# Patient Record
Sex: FEMALE | Race: WHITE | Employment: OTHER | URBAN - METROPOLITAN AREA
[De-identification: names, ages, dates, MRNs, and addresses within clinical notes are randomized per-mention and may not be internally consistent; named-entity substitution may affect disease eponyms.]

---

## 2021-01-01 ENCOUNTER — OFFICE VISIT (OUTPATIENT)
Dept: GERIATRIC MEDICINE | Age: 81
End: 2021-01-01
Payer: MEDICARE

## 2021-01-01 ENCOUNTER — HOSPITAL ENCOUNTER (INPATIENT)
Age: 81
LOS: 8 days | Discharge: NURSING, REHAB OR LONG-TERM CARE | DRG: 640 | End: 2021-09-04
Attending: EMERGENCY MEDICINE | Admitting: INTERNAL MEDICINE
Payer: MEDICARE

## 2021-01-01 ENCOUNTER — APPOINTMENT (OUTPATIENT)
Dept: GENERAL RADIOLOGY | Age: 81
DRG: 640 | End: 2021-01-01
Payer: MEDICARE

## 2021-01-01 ENCOUNTER — APPOINTMENT (OUTPATIENT)
Dept: ULTRASOUND IMAGING | Age: 81
DRG: 640 | End: 2021-01-01
Payer: MEDICARE

## 2021-01-01 ENCOUNTER — VIRTUAL VISIT (OUTPATIENT)
Dept: GERIATRIC MEDICINE | Age: 81
End: 2021-01-01
Payer: MEDICARE

## 2021-01-01 VITALS
RESPIRATION RATE: 20 BRPM | HEART RATE: 71 BPM | DIASTOLIC BLOOD PRESSURE: 51 MMHG | OXYGEN SATURATION: 93 % | SYSTOLIC BLOOD PRESSURE: 124 MMHG

## 2021-01-01 VITALS — DIASTOLIC BLOOD PRESSURE: 68 MMHG | TEMPERATURE: 98.4 F | HEART RATE: 86 BPM | SYSTOLIC BLOOD PRESSURE: 113 MMHG

## 2021-01-01 VITALS
OXYGEN SATURATION: 99 % | BODY MASS INDEX: 47.7 KG/M2 | WEIGHT: 269.18 LBS | SYSTOLIC BLOOD PRESSURE: 99 MMHG | HEIGHT: 63 IN | DIASTOLIC BLOOD PRESSURE: 70 MMHG | HEART RATE: 60 BPM | TEMPERATURE: 97.2 F | RESPIRATION RATE: 16 BRPM

## 2021-01-01 VITALS
SYSTOLIC BLOOD PRESSURE: 124 MMHG | RESPIRATION RATE: 18 BRPM | TEMPERATURE: 97 F | OXYGEN SATURATION: 97 % | DIASTOLIC BLOOD PRESSURE: 64 MMHG | HEART RATE: 64 BPM

## 2021-01-01 VITALS
RESPIRATION RATE: 22 BRPM | WEIGHT: 269 LBS | OXYGEN SATURATION: 95 % | HEART RATE: 88 BPM | TEMPERATURE: 97.4 F | DIASTOLIC BLOOD PRESSURE: 78 MMHG | SYSTOLIC BLOOD PRESSURE: 142 MMHG | BODY MASS INDEX: 47.65 KG/M2

## 2021-01-01 VITALS
TEMPERATURE: 98.1 F | WEIGHT: 200.8 LBS | SYSTOLIC BLOOD PRESSURE: 130 MMHG | OXYGEN SATURATION: 94 % | RESPIRATION RATE: 16 BRPM | HEART RATE: 60 BPM | BODY MASS INDEX: 35.57 KG/M2 | DIASTOLIC BLOOD PRESSURE: 68 MMHG

## 2021-01-01 VITALS
HEART RATE: 64 BPM | OXYGEN SATURATION: 90 % | DIASTOLIC BLOOD PRESSURE: 55 MMHG | RESPIRATION RATE: 16 BRPM | TEMPERATURE: 98.3 F | SYSTOLIC BLOOD PRESSURE: 112 MMHG

## 2021-01-01 VITALS
SYSTOLIC BLOOD PRESSURE: 124 MMHG | HEART RATE: 61 BPM | OXYGEN SATURATION: 96 % | TEMPERATURE: 98.1 F | DIASTOLIC BLOOD PRESSURE: 53 MMHG

## 2021-01-01 DIAGNOSIS — D50.9 IRON DEFICIENCY ANEMIA, UNSPECIFIED IRON DEFICIENCY ANEMIA TYPE: ICD-10-CM

## 2021-01-01 DIAGNOSIS — E87.6 HYPOKALEMIA: Primary | ICD-10-CM

## 2021-01-01 DIAGNOSIS — I27.20 PULMONARY HTN (HCC): ICD-10-CM

## 2021-01-01 DIAGNOSIS — G93.40 ENCEPHALOPATHY: Primary | ICD-10-CM

## 2021-01-01 DIAGNOSIS — N17.9 ACUTE RENAL FAILURE, UNSPECIFIED ACUTE RENAL FAILURE TYPE (HCC): ICD-10-CM

## 2021-01-01 DIAGNOSIS — E87.5 HYPERKALEMIA: ICD-10-CM

## 2021-01-01 DIAGNOSIS — N17.9 AKI (ACUTE KIDNEY INJURY) (HCC): ICD-10-CM

## 2021-01-01 DIAGNOSIS — K59.01 SLOW TRANSIT CONSTIPATION: ICD-10-CM

## 2021-01-01 DIAGNOSIS — D69.6 THROMBOCYTOPENIA (HCC): ICD-10-CM

## 2021-01-01 DIAGNOSIS — R60.0 BILATERAL EDEMA OF LOWER EXTREMITY: ICD-10-CM

## 2021-01-01 DIAGNOSIS — I50.32 CHRONIC DIASTOLIC CONGESTIVE HEART FAILURE (HCC): Primary | ICD-10-CM

## 2021-01-01 DIAGNOSIS — I25.5 ISCHEMIC CARDIOMYOPATHY: ICD-10-CM

## 2021-01-01 DIAGNOSIS — N18.30 STAGE 3 CHRONIC KIDNEY DISEASE, UNSPECIFIED WHETHER STAGE 3A OR 3B CKD (HCC): ICD-10-CM

## 2021-01-01 DIAGNOSIS — I50.22 CHRONIC SYSTOLIC HEART FAILURE (HCC): ICD-10-CM

## 2021-01-01 DIAGNOSIS — R53.1 GENERALIZED WEAKNESS: ICD-10-CM

## 2021-01-01 DIAGNOSIS — E87.6 HYPOKALEMIA: ICD-10-CM

## 2021-01-01 DIAGNOSIS — I50.23 ACUTE ON CHRONIC SYSTOLIC CONGESTIVE HEART FAILURE (HCC): ICD-10-CM

## 2021-01-01 DIAGNOSIS — I50.9 ACUTE CONGESTIVE HEART FAILURE, UNSPECIFIED HEART FAILURE TYPE (HCC): Primary | ICD-10-CM

## 2021-01-01 DIAGNOSIS — I50.22 CHRONIC SYSTOLIC HEART FAILURE (HCC): Primary | ICD-10-CM

## 2021-01-01 DIAGNOSIS — N18.4 CKD (CHRONIC KIDNEY DISEASE) STAGE 4, GFR 15-29 ML/MIN (HCC): ICD-10-CM

## 2021-01-01 DIAGNOSIS — D86.0 PULMONARY SARCOIDOSIS (HCC): ICD-10-CM

## 2021-01-01 DIAGNOSIS — J15.9 BACTERIAL PNEUMONIA: Primary | ICD-10-CM

## 2021-01-01 DIAGNOSIS — J44.9 COPD MIXED TYPE (HCC): ICD-10-CM

## 2021-01-01 DIAGNOSIS — D86.9 SARCOIDOSIS: ICD-10-CM

## 2021-01-01 DIAGNOSIS — I48.0 PAF (PAROXYSMAL ATRIAL FIBRILLATION) (HCC): Primary | ICD-10-CM

## 2021-01-01 DIAGNOSIS — Z91.89: ICD-10-CM

## 2021-01-01 DIAGNOSIS — E83.52 HYPERCALCEMIA: ICD-10-CM

## 2021-01-01 DIAGNOSIS — I15.9 SECONDARY HYPERTENSION: ICD-10-CM

## 2021-01-01 DIAGNOSIS — E83.51 HYPOCALCEMIA: ICD-10-CM

## 2021-01-01 DIAGNOSIS — E83.42 HYPOMAGNESEMIA: Primary | ICD-10-CM

## 2021-01-01 DIAGNOSIS — R41.0 DISORIENTATION: ICD-10-CM

## 2021-01-01 DIAGNOSIS — R79.89 AZOTEMIA: ICD-10-CM

## 2021-01-01 DIAGNOSIS — I10 PRIMARY HYPERTENSION: ICD-10-CM

## 2021-01-01 DIAGNOSIS — D86.0 PULMONARY SARCOIDOSIS (HCC): Primary | ICD-10-CM

## 2021-01-01 DIAGNOSIS — J44.9 COPD MIXED TYPE (HCC): Primary | ICD-10-CM

## 2021-01-01 LAB
ALBUMIN SERPL-MCNC: 2.4 G/DL (ref 3.5–4.6)
ALBUMIN SERPL-MCNC: 2.5 G/DL (ref 3.5–4.6)
ALBUMIN SERPL-MCNC: 2.6 G/DL (ref 3.5–4.6)
ALBUMIN SERPL-MCNC: 2.6 G/DL (ref 3.5–4.6)
ALBUMIN SERPL-MCNC: 2.7 G/DL (ref 3.5–4.6)
ALBUMIN SERPL-MCNC: 2.9 G/DL (ref 3.5–4.6)
ALP BLD-CCNC: 44 U/L (ref 40–130)
ALP BLD-CCNC: 45 U/L (ref 40–130)
ALP BLD-CCNC: 46 U/L (ref 40–130)
ALP BLD-CCNC: 47 U/L (ref 40–130)
ALP BLD-CCNC: 47 U/L (ref 40–130)
ALP BLD-CCNC: 50 U/L (ref 40–130)
ALT SERPL-CCNC: 6 U/L (ref 0–33)
ALT SERPL-CCNC: 6 U/L (ref 0–33)
ALT SERPL-CCNC: 7 U/L (ref 0–33)
ALT SERPL-CCNC: <5 U/L (ref 0–33)
ANION GAP SERPL CALCULATED.3IONS-SCNC: 10 MEQ/L (ref 9–15)
ANION GAP SERPL CALCULATED.3IONS-SCNC: 6 MEQ/L (ref 9–15)
ANION GAP SERPL CALCULATED.3IONS-SCNC: 6 MEQ/L (ref 9–15)
ANION GAP SERPL CALCULATED.3IONS-SCNC: 7 MEQ/L (ref 9–15)
ANION GAP SERPL CALCULATED.3IONS-SCNC: 8 MEQ/L (ref 9–15)
ANION GAP SERPL CALCULATED.3IONS-SCNC: 9 MEQ/L (ref 9–15)
ANION GAP SERPL CALCULATED.3IONS-SCNC: 9 MEQ/L (ref 9–15)
ANISOCYTOSIS: ABNORMAL
AST SERPL-CCNC: 11 U/L (ref 0–35)
AST SERPL-CCNC: 12 U/L (ref 0–35)
AST SERPL-CCNC: 13 U/L (ref 0–35)
AST SERPL-CCNC: 14 U/L (ref 0–35)
AST SERPL-CCNC: 21 U/L (ref 0–35)
BASOPHILS ABSOLUTE: 0 K/UL (ref 0–0.2)
BASOPHILS RELATIVE PERCENT: 0.4 %
BASOPHILS RELATIVE PERCENT: 0.6 %
BASOPHILS RELATIVE PERCENT: 0.7 %
BASOPHILS RELATIVE PERCENT: 0.7 %
BASOPHILS RELATIVE PERCENT: 0.8 %
BASOPHILS RELATIVE PERCENT: 0.9 %
BASOPHILS RELATIVE PERCENT: 1 %
BILIRUB SERPL-MCNC: 0.5 MG/DL (ref 0.2–0.7)
BILIRUB SERPL-MCNC: 0.6 MG/DL (ref 0.2–0.7)
BILIRUB SERPL-MCNC: 0.7 MG/DL (ref 0.2–0.7)
BUN BLDV-MCNC: 39 MG/DL
BUN BLDV-MCNC: 48 MG/DL (ref 8–23)
BUN BLDV-MCNC: 52 MG/DL (ref 8–23)
BUN BLDV-MCNC: 54 MG/DL (ref 8–23)
BUN BLDV-MCNC: 54 MG/DL (ref 8–23)
BUN BLDV-MCNC: 55 MG/DL (ref 8–23)
BUN BLDV-MCNC: 56 MG/DL (ref 8–23)
BUN BLDV-MCNC: 57 MG/DL (ref 8–23)
BUN BLDV-MCNC: 58 MG/DL (ref 8–23)
BUN BLDV-MCNC: 59 MG/DL (ref 8–23)
BUN BLDV-MCNC: 59 MG/DL (ref 8–23)
BUN BLDV-MCNC: 71 MG/DL (ref 8–23)
BUN BLDV-MCNC: 75 MG/DL (ref 8–23)
CALCIUM SERPL-MCNC: 7.8 MG/DL (ref 8.5–9.9)
CALCIUM SERPL-MCNC: 7.9 MG/DL (ref 8.5–9.9)
CALCIUM SERPL-MCNC: 8 MG/DL (ref 8.5–9.9)
CALCIUM SERPL-MCNC: 8.1 MG/DL (ref 8.5–9.9)
CALCIUM SERPL-MCNC: 8.2 MG/DL (ref 8.5–9.9)
CALCIUM SERPL-MCNC: 8.3 MG/DL (ref 8.5–9.9)
CALCIUM SERPL-MCNC: 8.4 MG/DL (ref 8.5–9.9)
CALCIUM SERPL-MCNC: 8.5 MG/DL (ref 8.5–9.9)
CALCIUM SERPL-MCNC: 8.8 MG/DL (ref 8.5–9.9)
CALCIUM SERPL-MCNC: 8.9 MG/DL
CHLORIDE BLD-SCNC: 101 MEQ/L (ref 95–107)
CHLORIDE BLD-SCNC: 101 MEQ/L (ref 95–107)
CHLORIDE BLD-SCNC: 102 MEQ/L (ref 95–107)
CHLORIDE BLD-SCNC: 104 MEQ/L (ref 95–107)
CHLORIDE BLD-SCNC: 105 MEQ/L (ref 95–107)
CHLORIDE BLD-SCNC: 105 MMOL/L
CHLORIDE BLD-SCNC: 106 MEQ/L (ref 95–107)
CHLORIDE BLD-SCNC: 92 MEQ/L (ref 95–107)
CHLORIDE BLD-SCNC: 93 MEQ/L (ref 95–107)
CHLORIDE BLD-SCNC: 95 MEQ/L (ref 95–107)
CHLORIDE BLD-SCNC: 96 MEQ/L (ref 95–107)
CHLORIDE BLD-SCNC: 97 MEQ/L (ref 95–107)
CHLORIDE BLD-SCNC: 97 MEQ/L (ref 95–107)
CHLORIDE BLD-SCNC: 99 MEQ/L (ref 95–107)
CHLORIDE URINE RANDOM: <20 MEQ/L
CO2: 22 MEQ/L (ref 20–31)
CO2: 23 MEQ/L (ref 20–31)
CO2: 25 MEQ/L (ref 20–31)
CO2: 26 MEQ/L (ref 20–31)
CO2: 26 MEQ/L (ref 20–31)
CO2: 27 MEQ/L (ref 20–31)
CO2: 29 MEQ/L (ref 20–31)
CO2: 29 MEQ/L (ref 20–31)
CO2: 29 MMOL/L
CO2: 30 MEQ/L (ref 20–31)
CO2: 31 MEQ/L (ref 20–31)
CO2: 31 MEQ/L (ref 20–31)
CO2: 32 MEQ/L (ref 20–31)
CREAT SERPL-MCNC: 1.84 MG/DL
CREAT SERPL-MCNC: 2 MG/DL (ref 0.5–0.9)
CREAT SERPL-MCNC: 2.08 MG/DL (ref 0.5–0.9)
CREAT SERPL-MCNC: 2.15 MG/DL (ref 0.5–0.9)
CREAT SERPL-MCNC: 2.18 MG/DL (ref 0.5–0.9)
CREAT SERPL-MCNC: 2.24 MG/DL (ref 0.5–0.9)
CREAT SERPL-MCNC: 2.27 MG/DL (ref 0.5–0.9)
CREAT SERPL-MCNC: 2.29 MG/DL (ref 0.5–0.9)
CREAT SERPL-MCNC: 2.31 MG/DL (ref 0.5–0.9)
CREAT SERPL-MCNC: 2.38 MG/DL (ref 0.5–0.9)
CREAT SERPL-MCNC: 2.41 MG/DL (ref 0.5–0.9)
CREAT SERPL-MCNC: 2.44 MG/DL (ref 0.5–0.9)
CREAT SERPL-MCNC: 2.5 MG/DL (ref 0.5–0.9)
CREAT SERPL-MCNC: 2.5 MG/DL (ref 0.5–0.9)
CREAT SERPL-MCNC: 2.52 MG/DL (ref 0.5–0.9)
CREAT SERPL-MCNC: 2.61 MG/DL (ref 0.5–0.9)
CREAT SERPL-MCNC: 2.62 MG/DL (ref 0.5–0.9)
CREAT SERPL-MCNC: 3.13 MG/DL (ref 0.5–0.9)
CREAT SERPL-MCNC: 3.13 MG/DL (ref 0.5–0.9)
CREATININE URINE: 152.9 MG/DL
EKG ATRIAL RATE: 64 BPM
EKG P AXIS: -65 DEGREES
EKG P-R INTERVAL: 188 MS
EKG Q-T INTERVAL: 378 MS
EKG QRS DURATION: 30 MS
EKG QTC CALCULATION (BAZETT): 502 MS
EKG R AXIS: 0 DEGREES
EKG T AXIS: 110 DEGREES
EKG VENTRICULAR RATE: 106 BPM
EOSINOPHILS ABSOLUTE: 0.1 K/UL (ref 0–0.7)
EOSINOPHILS RELATIVE PERCENT: 1.3 %
EOSINOPHILS RELATIVE PERCENT: 2 %
EOSINOPHILS RELATIVE PERCENT: 2.1 %
EOSINOPHILS RELATIVE PERCENT: 2.1 %
EOSINOPHILS RELATIVE PERCENT: 2.2 %
EOSINOPHILS RELATIVE PERCENT: 2.4 %
EOSINOPHILS RELATIVE PERCENT: 2.4 %
EOSINOPHILS RELATIVE PERCENT: 2.8 %
EOSINOPHILS RELATIVE PERCENT: 3.7 %
FERRITIN: 25.5 NG/ML (ref 13–150)
GFR AFRICAN AMERICAN: 17.3
GFR AFRICAN AMERICAN: 17.3
GFR AFRICAN AMERICAN: 21.2
GFR AFRICAN AMERICAN: 21.3
GFR AFRICAN AMERICAN: 22.2
GFR AFRICAN AMERICAN: 22.4
GFR AFRICAN AMERICAN: 22.4
GFR AFRICAN AMERICAN: 23
GFR AFRICAN AMERICAN: 23.4
GFR AFRICAN AMERICAN: 23.7
GFR AFRICAN AMERICAN: 24.5
GFR AFRICAN AMERICAN: 24.8
GFR AFRICAN AMERICAN: 25
GFR AFRICAN AMERICAN: 25.4
GFR AFRICAN AMERICAN: 26.2
GFR AFRICAN AMERICAN: 26.6
GFR AFRICAN AMERICAN: 27.7
GFR AFRICAN AMERICAN: 29
GFR CALCULATED: 29
GFR NON-AFRICAN AMERICAN: 14.3
GFR NON-AFRICAN AMERICAN: 14.3
GFR NON-AFRICAN AMERICAN: 17.5
GFR NON-AFRICAN AMERICAN: 17.6
GFR NON-AFRICAN AMERICAN: 18.3
GFR NON-AFRICAN AMERICAN: 18.5
GFR NON-AFRICAN AMERICAN: 18.5
GFR NON-AFRICAN AMERICAN: 19
GFR NON-AFRICAN AMERICAN: 19.3
GFR NON-AFRICAN AMERICAN: 19.6
GFR NON-AFRICAN AMERICAN: 20.3
GFR NON-AFRICAN AMERICAN: 20.5
GFR NON-AFRICAN AMERICAN: 20.7
GFR NON-AFRICAN AMERICAN: 21
GFR NON-AFRICAN AMERICAN: 21.7
GFR NON-AFRICAN AMERICAN: 22
GFR NON-AFRICAN AMERICAN: 22.9
GFR NON-AFRICAN AMERICAN: 23.9
GLOBULIN: 2.6 G/DL (ref 2.3–3.5)
GLOBULIN: 2.6 G/DL (ref 2.3–3.5)
GLOBULIN: 2.9 G/DL (ref 2.3–3.5)
GLOBULIN: 2.9 G/DL (ref 2.3–3.5)
GLOBULIN: 3 G/DL (ref 2.3–3.5)
GLOBULIN: 3 G/DL (ref 2.3–3.5)
GLOBULIN: 3.1 G/DL (ref 2.3–3.5)
GLOBULIN: 3.1 G/DL (ref 2.3–3.5)
GLUCOSE BLD-MCNC: 100 MG/DL (ref 60–115)
GLUCOSE BLD-MCNC: 100 MG/DL (ref 60–115)
GLUCOSE BLD-MCNC: 101 MG/DL (ref 60–115)
GLUCOSE BLD-MCNC: 103 MG/DL (ref 70–99)
GLUCOSE BLD-MCNC: 104 MG/DL (ref 70–99)
GLUCOSE BLD-MCNC: 106 MG/DL (ref 60–115)
GLUCOSE BLD-MCNC: 107 MG/DL (ref 60–115)
GLUCOSE BLD-MCNC: 107 MG/DL (ref 70–99)
GLUCOSE BLD-MCNC: 108 MG/DL (ref 60–115)
GLUCOSE BLD-MCNC: 109 MG/DL (ref 60–115)
GLUCOSE BLD-MCNC: 110 MG/DL (ref 60–115)
GLUCOSE BLD-MCNC: 113 MG/DL (ref 70–99)
GLUCOSE BLD-MCNC: 115 MG/DL (ref 70–99)
GLUCOSE BLD-MCNC: 124 MG/DL (ref 70–99)
GLUCOSE BLD-MCNC: 127 MG/DL (ref 60–115)
GLUCOSE BLD-MCNC: 134 MG/DL (ref 70–99)
GLUCOSE BLD-MCNC: 136 MG/DL (ref 70–99)
GLUCOSE BLD-MCNC: 158 MG/DL (ref 60–115)
GLUCOSE BLD-MCNC: 60 MG/DL
GLUCOSE BLD-MCNC: 70 MG/DL (ref 70–99)
GLUCOSE BLD-MCNC: 72 MG/DL (ref 70–99)
GLUCOSE BLD-MCNC: 72 MG/DL (ref 70–99)
GLUCOSE BLD-MCNC: 73 MG/DL (ref 70–99)
GLUCOSE BLD-MCNC: 78 MG/DL (ref 60–115)
GLUCOSE BLD-MCNC: 78 MG/DL (ref 70–99)
GLUCOSE BLD-MCNC: 78 MG/DL (ref 70–99)
GLUCOSE BLD-MCNC: 81 MG/DL (ref 70–99)
GLUCOSE BLD-MCNC: 84 MG/DL (ref 60–115)
GLUCOSE BLD-MCNC: 85 MG/DL (ref 70–99)
GLUCOSE BLD-MCNC: 88 MG/DL (ref 60–115)
GLUCOSE BLD-MCNC: 95 MG/DL (ref 70–99)
GLUCOSE BLD-MCNC: 98 MG/DL (ref 70–99)
HBV SURFACE AB TITR SER: NORMAL MIU/ML
HCT VFR BLD CALC: 25.7 % (ref 37–47)
HCT VFR BLD CALC: 25.9 % (ref 37–47)
HCT VFR BLD CALC: 25.9 % (ref 37–47)
HCT VFR BLD CALC: 26 % (ref 37–47)
HCT VFR BLD CALC: 26.3 % (ref 37–47)
HCT VFR BLD CALC: 26.4 % (ref 37–47)
HCT VFR BLD CALC: 26.5 % (ref 37–47)
HCT VFR BLD CALC: 27.1 % (ref 37–47)
HCT VFR BLD CALC: 31.3 % (ref 37–47)
HEMOGLOBIN: 8.2 G/DL (ref 12–16)
HEMOGLOBIN: 8.3 G/DL (ref 12–16)
HEMOGLOBIN: 8.3 G/DL (ref 12–16)
HEMOGLOBIN: 8.4 G/DL (ref 12–16)
HEMOGLOBIN: 8.4 G/DL (ref 12–16)
HEMOGLOBIN: 8.5 G/DL (ref 12–16)
HEMOGLOBIN: 8.7 G/DL (ref 12–16)
HEMOGLOBIN: 8.9 G/DL (ref 12–16)
HEMOGLOBIN: 9.9 G/DL (ref 12–16)
HEPATITIS B SURFACE ANTIGEN INTERPRETATION: NORMAL
HYPOCHROMIA: ABNORMAL
IRON SATURATION: 7 % (ref 11–46)
IRON: 20 UG/DL (ref 37–145)
LV EF: 30 %
LVEF MODALITY: NORMAL
LYMPHOCYTES ABSOLUTE: 0.8 K/UL (ref 1–4.8)
LYMPHOCYTES ABSOLUTE: 0.8 K/UL (ref 1–4.8)
LYMPHOCYTES ABSOLUTE: 0.9 K/UL (ref 1–4.8)
LYMPHOCYTES ABSOLUTE: 0.9 K/UL (ref 1–4.8)
LYMPHOCYTES ABSOLUTE: 1 K/UL (ref 1–4.8)
LYMPHOCYTES ABSOLUTE: 1.3 K/UL (ref 1–4.8)
LYMPHOCYTES ABSOLUTE: 1.3 K/UL (ref 1–4.8)
LYMPHOCYTES RELATIVE PERCENT: 18.2 %
LYMPHOCYTES RELATIVE PERCENT: 20.5 %
LYMPHOCYTES RELATIVE PERCENT: 22.6 %
LYMPHOCYTES RELATIVE PERCENT: 23.6 %
LYMPHOCYTES RELATIVE PERCENT: 24.5 %
LYMPHOCYTES RELATIVE PERCENT: 24.6 %
LYMPHOCYTES RELATIVE PERCENT: 24.9 %
LYMPHOCYTES RELATIVE PERCENT: 26 %
LYMPHOCYTES RELATIVE PERCENT: 29.1 %
MACROCYTES: ABNORMAL
MAGNESIUM: 1.7 MG/DL (ref 1.7–2.4)
MAGNESIUM: 1.8 MG/DL (ref 1.7–2.4)
MAGNESIUM: 1.8 MG/DL (ref 1.7–2.4)
MAGNESIUM: 1.9 MG/DL (ref 1.7–2.4)
MAGNESIUM: 2.1 MG/DL (ref 1.7–2.4)
MAGNESIUM: 2.3 MG/DL (ref 1.7–2.4)
MCH RBC QN AUTO: 29.5 PG (ref 27–31.3)
MCH RBC QN AUTO: 29.7 PG (ref 27–31.3)
MCH RBC QN AUTO: 30 PG (ref 27–31.3)
MCH RBC QN AUTO: 30.2 PG (ref 27–31.3)
MCH RBC QN AUTO: 30.4 PG (ref 27–31.3)
MCH RBC QN AUTO: 30.4 PG (ref 27–31.3)
MCH RBC QN AUTO: 30.9 PG (ref 27–31.3)
MCHC RBC AUTO-ENTMCNC: 31.1 % (ref 33–37)
MCHC RBC AUTO-ENTMCNC: 31.7 % (ref 33–37)
MCHC RBC AUTO-ENTMCNC: 31.8 % (ref 33–37)
MCHC RBC AUTO-ENTMCNC: 32 % (ref 33–37)
MCHC RBC AUTO-ENTMCNC: 32.5 % (ref 33–37)
MCHC RBC AUTO-ENTMCNC: 32.5 % (ref 33–37)
MCHC RBC AUTO-ENTMCNC: 32.6 % (ref 33–37)
MCHC RBC AUTO-ENTMCNC: 32.7 % (ref 33–37)
MCHC RBC AUTO-ENTMCNC: 32.8 % (ref 33–37)
MCV RBC AUTO: 91.4 FL (ref 82–100)
MCV RBC AUTO: 92.1 FL (ref 82–100)
MCV RBC AUTO: 92.1 FL (ref 82–100)
MCV RBC AUTO: 93 FL (ref 82–100)
MCV RBC AUTO: 93.6 FL (ref 82–100)
MCV RBC AUTO: 94.3 FL (ref 82–100)
MCV RBC AUTO: 94.7 FL (ref 82–100)
MCV RBC AUTO: 95.2 FL (ref 82–100)
MCV RBC AUTO: 95.7 FL (ref 82–100)
MISCELLANEOUS LAB TEST ORDER: NORMAL
MONOCYTES ABSOLUTE: 0.3 K/UL (ref 0.2–0.8)
MONOCYTES ABSOLUTE: 0.5 K/UL (ref 0.2–0.8)
MONOCYTES ABSOLUTE: 0.5 K/UL (ref 0.2–0.8)
MONOCYTES ABSOLUTE: 0.6 K/UL (ref 0.2–0.8)
MONOCYTES ABSOLUTE: 0.7 K/UL (ref 0.2–0.8)
MONOCYTES ABSOLUTE: 0.8 K/UL (ref 0.2–0.8)
MONOCYTES RELATIVE PERCENT: 11 %
MONOCYTES RELATIVE PERCENT: 12.8 %
MONOCYTES RELATIVE PERCENT: 12.9 %
MONOCYTES RELATIVE PERCENT: 13.7 %
MONOCYTES RELATIVE PERCENT: 14.7 %
MONOCYTES RELATIVE PERCENT: 14.8 %
MONOCYTES RELATIVE PERCENT: 15.1 %
MONOCYTES RELATIVE PERCENT: 16.7 %
MONOCYTES RELATIVE PERCENT: 8.7 %
NEUTROPHILS ABSOLUTE: 1.8 K/UL (ref 1.4–6.5)
NEUTROPHILS ABSOLUTE: 2 K/UL (ref 1.4–6.5)
NEUTROPHILS ABSOLUTE: 2 K/UL (ref 1.4–6.5)
NEUTROPHILS ABSOLUTE: 2.2 K/UL (ref 1.4–6.5)
NEUTROPHILS ABSOLUTE: 2.4 K/UL (ref 1.4–6.5)
NEUTROPHILS ABSOLUTE: 3.2 K/UL (ref 1.4–6.5)
NEUTROPHILS ABSOLUTE: 4.8 K/UL (ref 1.4–6.5)
NEUTROPHILS RELATIVE PERCENT: 52.9 %
NEUTROPHILS RELATIVE PERCENT: 55 %
NEUTROPHILS RELATIVE PERCENT: 57.4 %
NEUTROPHILS RELATIVE PERCENT: 58.5 %
NEUTROPHILS RELATIVE PERCENT: 59.1 %
NEUTROPHILS RELATIVE PERCENT: 60.2 %
NEUTROPHILS RELATIVE PERCENT: 63 %
NEUTROPHILS RELATIVE PERCENT: 63.3 %
NEUTROPHILS RELATIVE PERCENT: 68.8 %
PDW BLD-RTO: 15.4 % (ref 11.5–14.5)
PDW BLD-RTO: 15.5 % (ref 11.5–14.5)
PDW BLD-RTO: 15.6 % (ref 11.5–14.5)
PDW BLD-RTO: 15.6 % (ref 11.5–14.5)
PDW BLD-RTO: 15.7 % (ref 11.5–14.5)
PDW BLD-RTO: 15.8 % (ref 11.5–14.5)
PDW BLD-RTO: 15.9 % (ref 11.5–14.5)
PERFORMED ON: ABNORMAL
PERFORMED ON: ABNORMAL
PERFORMED ON: NORMAL
PLATELET # BLD: 126 K/UL (ref 130–400)
PLATELET # BLD: 126 K/UL (ref 130–400)
PLATELET # BLD: 128 K/UL (ref 130–400)
PLATELET # BLD: 132 K/UL (ref 130–400)
PLATELET # BLD: 133 K/UL (ref 130–400)
PLATELET # BLD: 143 K/UL (ref 130–400)
PLATELET # BLD: 147 K/UL (ref 130–400)
PLATELET # BLD: 155 K/UL (ref 130–400)
PLATELET # BLD: 250 K/UL (ref 130–400)
PLATELET SLIDE REVIEW: ABNORMAL
POIKILOCYTES: ABNORMAL
POTASSIUM SERPL-SCNC: 3.2 MEQ/L (ref 3.4–4.9)
POTASSIUM SERPL-SCNC: 3.3 MEQ/L (ref 3.4–4.9)
POTASSIUM SERPL-SCNC: 3.4 MEQ/L (ref 3.4–4.9)
POTASSIUM SERPL-SCNC: 3.5 MEQ/L (ref 3.4–4.9)
POTASSIUM SERPL-SCNC: 3.5 MEQ/L (ref 3.4–4.9)
POTASSIUM SERPL-SCNC: 3.7 MEQ/L (ref 3.4–4.9)
POTASSIUM SERPL-SCNC: 3.7 MMOL/L
POTASSIUM SERPL-SCNC: 3.9 MEQ/L (ref 3.4–4.9)
POTASSIUM SERPL-SCNC: 4.4 MEQ/L (ref 3.4–4.9)
POTASSIUM SERPL-SCNC: 4.5 MEQ/L (ref 3.4–4.9)
POTASSIUM SERPL-SCNC: 4.6 MEQ/L (ref 3.4–4.9)
POTASSIUM SERPL-SCNC: 4.6 MEQ/L (ref 3.4–4.9)
POTASSIUM SERPL-SCNC: 4.7 MEQ/L (ref 3.4–4.9)
POTASSIUM SERPL-SCNC: 4.8 MEQ/L (ref 3.4–4.9)
POTASSIUM SERPL-SCNC: 5 MEQ/L (ref 3.4–4.9)
POTASSIUM SERPL-SCNC: 5.5 MEQ/L (ref 3.4–4.9)
POTASSIUM SERPL-SCNC: 8 MEQ/L (ref 3.4–4.9)
POTASSIUM SERPL-SCNC: 8.5 MEQ/L (ref 3.4–4.9)
POTASSIUM SERPL-SCNC: 8.7 MEQ/L (ref 3.4–4.9)
POTASSIUM, UR: 54.9 MEQ/L
PRO-BNP: 5856 PG/ML
RBC # BLD: 2.73 M/UL (ref 4.2–5.4)
RBC # BLD: 2.76 M/UL (ref 4.2–5.4)
RBC # BLD: 2.77 M/UL (ref 4.2–5.4)
RBC # BLD: 2.78 M/UL (ref 4.2–5.4)
RBC # BLD: 2.81 M/UL (ref 4.2–5.4)
RBC # BLD: 2.82 M/UL (ref 4.2–5.4)
RBC # BLD: 2.88 M/UL (ref 4.2–5.4)
RBC # BLD: 2.91 M/UL (ref 4.2–5.4)
RBC # BLD: 3.32 M/UL (ref 4.2–5.4)
SODIUM BLD-SCNC: 129 MEQ/L (ref 135–144)
SODIUM BLD-SCNC: 132 MEQ/L (ref 135–144)
SODIUM BLD-SCNC: 132 MEQ/L (ref 135–144)
SODIUM BLD-SCNC: 133 MEQ/L (ref 135–144)
SODIUM BLD-SCNC: 134 MEQ/L (ref 135–144)
SODIUM BLD-SCNC: 134 MEQ/L (ref 135–144)
SODIUM BLD-SCNC: 135 MEQ/L (ref 135–144)
SODIUM BLD-SCNC: 136 MEQ/L (ref 135–144)
SODIUM BLD-SCNC: 137 MEQ/L (ref 135–144)
SODIUM BLD-SCNC: 137 MEQ/L (ref 135–144)
SODIUM BLD-SCNC: 138 MEQ/L (ref 135–144)
SODIUM BLD-SCNC: 142 MMOL/L
SODIUM URINE: <20 MEQ/L
TARGET CELLS: ABNORMAL
TOTAL IRON BINDING CAPACITY: 295 UG/DL (ref 178–450)
TOTAL PROTEIN: 5.2 G/DL (ref 6.3–8)
TOTAL PROTEIN: 5.3 G/DL (ref 6.3–8)
TOTAL PROTEIN: 5.4 G/DL (ref 6.3–8)
TOTAL PROTEIN: 5.4 G/DL (ref 6.3–8)
TOTAL PROTEIN: 5.5 G/DL (ref 6.3–8)
TOTAL PROTEIN: 5.5 G/DL (ref 6.3–8)
TOTAL PROTEIN: 5.6 G/DL (ref 6.3–8)
TOTAL PROTEIN: 6 G/DL (ref 6.3–8)
TROPONIN: <0.01 NG/ML (ref 0–0.01)
TROPONIN: <0.01 NG/ML (ref 0–0.01)
UREA NITROGEN, UR: 386 MG/DL
WBC # BLD: 3.1 K/UL (ref 4.8–10.8)
WBC # BLD: 3.2 K/UL (ref 4.8–10.8)
WBC # BLD: 3.8 K/UL (ref 4.8–10.8)
WBC # BLD: 4 K/UL (ref 4.8–10.8)
WBC # BLD: 4 K/UL (ref 4.8–10.8)
WBC # BLD: 4.1 K/UL (ref 4.8–10.8)
WBC # BLD: 4.6 K/UL (ref 4.8–10.8)
WBC # BLD: 5 K/UL (ref 4.8–10.8)
WBC # BLD: 7 K/UL (ref 4.8–10.8)
WHOPPER PROMPT: NORMAL

## 2021-01-01 PROCEDURE — 05HM33Z INSERTION OF INFUSION DEVICE INTO RIGHT INTERNAL JUGULAR VEIN, PERCUTANEOUS APPROACH: ICD-10-PCS | Performed by: INTERNAL MEDICINE

## 2021-01-01 PROCEDURE — 80048 BASIC METABOLIC PNL TOTAL CA: CPT

## 2021-01-01 PROCEDURE — 2580000003 HC RX 258: Performed by: NURSE PRACTITIONER

## 2021-01-01 PROCEDURE — 99442 PR PHYS/QHP TELEPHONE EVALUATION 11-20 MIN: CPT | Performed by: PHYSICIAN ASSISTANT

## 2021-01-01 PROCEDURE — 94761 N-INVAS EAR/PLS OXIMETRY MLT: CPT

## 2021-01-01 PROCEDURE — 6360000002 HC RX W HCPCS: Performed by: STUDENT IN AN ORGANIZED HEALTH CARE EDUCATION/TRAINING PROGRAM

## 2021-01-01 PROCEDURE — 1123F ACP DISCUSS/DSCN MKR DOCD: CPT | Performed by: PHYSICIAN ASSISTANT

## 2021-01-01 PROCEDURE — 84484 ASSAY OF TROPONIN QUANT: CPT

## 2021-01-01 PROCEDURE — 36415 COLL VENOUS BLD VENIPUNCTURE: CPT

## 2021-01-01 PROCEDURE — 6360000002 HC RX W HCPCS: Performed by: INTERNAL MEDICINE

## 2021-01-01 PROCEDURE — 2500000003 HC RX 250 WO HCPCS: Performed by: INTERNAL MEDICINE

## 2021-01-01 PROCEDURE — 84300 ASSAY OF URINE SODIUM: CPT

## 2021-01-01 PROCEDURE — 97112 NEUROMUSCULAR REEDUCATION: CPT

## 2021-01-01 PROCEDURE — 1210000000 HC MED SURG R&B

## 2021-01-01 PROCEDURE — 2580000003 HC RX 258: Performed by: INTERNAL MEDICINE

## 2021-01-01 PROCEDURE — 6370000000 HC RX 637 (ALT 250 FOR IP): Performed by: INTERNAL MEDICINE

## 2021-01-01 PROCEDURE — 83880 ASSAY OF NATRIURETIC PEPTIDE: CPT

## 2021-01-01 PROCEDURE — G8484 FLU IMMUNIZE NO ADMIN: HCPCS | Performed by: PHYSICIAN ASSISTANT

## 2021-01-01 PROCEDURE — 71045 X-RAY EXAM CHEST 1 VIEW: CPT

## 2021-01-01 PROCEDURE — 99309 SBSQ NF CARE MODERATE MDM 30: CPT | Performed by: PHYSICIAN ASSISTANT

## 2021-01-01 PROCEDURE — 6370000000 HC RX 637 (ALT 250 FOR IP): Performed by: STUDENT IN AN ORGANIZED HEALTH CARE EDUCATION/TRAINING PROGRAM

## 2021-01-01 PROCEDURE — 96374 THER/PROPH/DIAG INJ IV PUSH: CPT

## 2021-01-01 PROCEDURE — G0257 UNSCHED DIALYSIS ESRD PT HOS: HCPCS

## 2021-01-01 PROCEDURE — 80053 COMPREHEN METABOLIC PANEL: CPT

## 2021-01-01 PROCEDURE — 99232 SBSQ HOSP IP/OBS MODERATE 35: CPT | Performed by: NURSE PRACTITIONER

## 2021-01-01 PROCEDURE — 87340 HEPATITIS B SURFACE AG IA: CPT

## 2021-01-01 PROCEDURE — 97116 GAIT TRAINING THERAPY: CPT

## 2021-01-01 PROCEDURE — 2700000000 HC OXYGEN THERAPY PER DAY

## 2021-01-01 PROCEDURE — 99310 SBSQ NF CARE HIGH MDM 45: CPT | Performed by: PHYSICIAN ASSISTANT

## 2021-01-01 PROCEDURE — 99305 1ST NF CARE MODERATE MDM 35: CPT | Performed by: INTERNAL MEDICINE

## 2021-01-01 PROCEDURE — 2580000003 HC RX 258: Performed by: STUDENT IN AN ORGANIZED HEALTH CARE EDUCATION/TRAINING PROGRAM

## 2021-01-01 PROCEDURE — 8010000000 HC HEMODIALYSIS ACUTE INPT

## 2021-01-01 PROCEDURE — 85025 COMPLETE CBC W/AUTO DIFF WBC: CPT

## 2021-01-01 PROCEDURE — 83735 ASSAY OF MAGNESIUM: CPT

## 2021-01-01 PROCEDURE — 97535 SELF CARE MNGMENT TRAINING: CPT

## 2021-01-01 PROCEDURE — 99284 EMERGENCY DEPT VISIT MOD MDM: CPT

## 2021-01-01 PROCEDURE — 76775 US EXAM ABDO BACK WALL LIM: CPT

## 2021-01-01 PROCEDURE — 99233 SBSQ HOSP IP/OBS HIGH 50: CPT | Performed by: INTERNAL MEDICINE

## 2021-01-01 PROCEDURE — 83540 ASSAY OF IRON: CPT

## 2021-01-01 PROCEDURE — 6360000002 HC RX W HCPCS: Performed by: EMERGENCY MEDICINE

## 2021-01-01 PROCEDURE — 93010 ELECTROCARDIOGRAM REPORT: CPT | Performed by: INTERNAL MEDICINE

## 2021-01-01 PROCEDURE — 83935 ASSAY OF URINE OSMOLALITY: CPT

## 2021-01-01 PROCEDURE — 94664 DEMO&/EVAL PT USE INHALER: CPT

## 2021-01-01 PROCEDURE — 82570 ASSAY OF URINE CREATININE: CPT

## 2021-01-01 PROCEDURE — 84132 ASSAY OF SERUM POTASSIUM: CPT

## 2021-01-01 PROCEDURE — 97162 PT EVAL MOD COMPLEX 30 MIN: CPT

## 2021-01-01 PROCEDURE — 1123F ACP DISCUSS/DSCN MKR DOCD: CPT | Performed by: INTERNAL MEDICINE

## 2021-01-01 PROCEDURE — 99223 1ST HOSP IP/OBS HIGH 75: CPT | Performed by: INTERNAL MEDICINE

## 2021-01-01 PROCEDURE — 2500000003 HC RX 250 WO HCPCS: Performed by: EMERGENCY MEDICINE

## 2021-01-01 PROCEDURE — 99443 PR PHYS/QHP TELEPHONE EVALUATION 21-30 MIN: CPT | Performed by: PHYSICIAN ASSISTANT

## 2021-01-01 PROCEDURE — 97166 OT EVAL MOD COMPLEX 45 MIN: CPT

## 2021-01-01 PROCEDURE — 84133 ASSAY OF URINE POTASSIUM: CPT

## 2021-01-01 PROCEDURE — 99221 1ST HOSP IP/OBS SF/LOW 40: CPT | Performed by: NURSE PRACTITIONER

## 2021-01-01 PROCEDURE — 6370000000 HC RX 637 (ALT 250 FOR IP): Performed by: EMERGENCY MEDICINE

## 2021-01-01 PROCEDURE — 84540 ASSAY OF URINE/UREA-N: CPT

## 2021-01-01 PROCEDURE — 82728 ASSAY OF FERRITIN: CPT

## 2021-01-01 PROCEDURE — 99231 SBSQ HOSP IP/OBS SF/LOW 25: CPT | Performed by: PHYSICAL MEDICINE & REHABILITATION

## 2021-01-01 PROCEDURE — 99221 1ST HOSP IP/OBS SF/LOW 40: CPT | Performed by: PHYSICAL MEDICINE & REHABILITATION

## 2021-01-01 PROCEDURE — G8484 FLU IMMUNIZE NO ADMIN: HCPCS | Performed by: INTERNAL MEDICINE

## 2021-01-01 PROCEDURE — 93306 TTE W/DOPPLER COMPLETE: CPT

## 2021-01-01 PROCEDURE — 36556 INSERT NON-TUNNEL CV CATH: CPT

## 2021-01-01 PROCEDURE — 5A1D70Z PERFORMANCE OF URINARY FILTRATION, INTERMITTENT, LESS THAN 6 HOURS PER DAY: ICD-10-PCS | Performed by: INTERNAL MEDICINE

## 2021-01-01 PROCEDURE — 94640 AIRWAY INHALATION TREATMENT: CPT

## 2021-01-01 PROCEDURE — 86706 HEP B SURFACE ANTIBODY: CPT

## 2021-01-01 PROCEDURE — 93280 PM DEVICE PROGR EVAL DUAL: CPT

## 2021-01-01 PROCEDURE — 93005 ELECTROCARDIOGRAM TRACING: CPT | Performed by: EMERGENCY MEDICINE

## 2021-01-01 PROCEDURE — 82436 ASSAY OF URINE CHLORIDE: CPT

## 2021-01-01 PROCEDURE — 2000000000 HC ICU R&B

## 2021-01-01 PROCEDURE — 2580000003 HC RX 258: Performed by: EMERGENCY MEDICINE

## 2021-01-01 PROCEDURE — 99316 NF DSCHRG MGMT 30 MIN+: CPT | Performed by: PHYSICIAN ASSISTANT

## 2021-01-01 PROCEDURE — 83550 IRON BINDING TEST: CPT

## 2021-01-01 RX ORDER — ATORVASTATIN CALCIUM 40 MG/1
40 TABLET, FILM COATED ORAL NIGHTLY
Status: DISCONTINUED | OUTPATIENT
Start: 2021-01-01 | End: 2021-01-01 | Stop reason: HOSPADM

## 2021-01-01 RX ORDER — ACETAMINOPHEN 650 MG/1
650 SUPPOSITORY RECTAL EVERY 6 HOURS PRN
Status: DISCONTINUED | OUTPATIENT
Start: 2021-01-01 | End: 2021-01-01 | Stop reason: HOSPADM

## 2021-01-01 RX ORDER — LACTOBACILLUS ACIDOPHILUS 500MM CELL
1 CAPSULE ORAL 3 TIMES DAILY
COMMUNITY

## 2021-01-01 RX ORDER — LANOLIN ALCOHOL/MO/W.PET/CERES
800 CREAM (GRAM) TOPICAL DAILY
Status: DISCONTINUED | OUTPATIENT
Start: 2021-01-01 | End: 2021-01-01 | Stop reason: HOSPADM

## 2021-01-01 RX ORDER — 0.9 % SODIUM CHLORIDE 0.9 %
250 INTRAVENOUS SOLUTION INTRAVENOUS PRN
Status: ACTIVE | OUTPATIENT
Start: 2021-01-01 | End: 2021-01-01

## 2021-01-01 RX ORDER — ONDANSETRON 2 MG/ML
4 INJECTION INTRAMUSCULAR; INTRAVENOUS EVERY 6 HOURS PRN
Status: DISCONTINUED | OUTPATIENT
Start: 2021-01-01 | End: 2021-01-01 | Stop reason: HOSPADM

## 2021-01-01 RX ORDER — ONDANSETRON 4 MG/1
4 TABLET, ORALLY DISINTEGRATING ORAL EVERY 8 HOURS PRN
Status: DISCONTINUED | OUTPATIENT
Start: 2021-01-01 | End: 2021-01-01 | Stop reason: HOSPADM

## 2021-01-01 RX ORDER — MIDODRINE HYDROCHLORIDE 5 MG/1
10 TABLET ORAL 3 TIMES DAILY
Status: DISCONTINUED | OUTPATIENT
Start: 2021-01-01 | End: 2021-01-01 | Stop reason: HOSPADM

## 2021-01-01 RX ORDER — HYDROXYCHLOROQUINE SULFATE 200 MG/1
200 TABLET, FILM COATED ORAL 2 TIMES DAILY
COMMUNITY

## 2021-01-01 RX ORDER — METOLAZONE 2.5 MG/1
2.5 TABLET ORAL DAILY
Status: DISCONTINUED | OUTPATIENT
Start: 2021-01-01 | End: 2021-01-01

## 2021-01-01 RX ORDER — MECLIZINE HCL 12.5 MG/1
12.5 TABLET ORAL 2 TIMES DAILY
Qty: 20 TABLET | Refills: 0 | DISCHARGE
Start: 2021-01-01 | End: 2021-01-01

## 2021-01-01 RX ORDER — NICOTINE POLACRILEX 4 MG
15 LOZENGE BUCCAL PRN
Status: DISCONTINUED | OUTPATIENT
Start: 2021-01-01 | End: 2021-01-01 | Stop reason: HOSPADM

## 2021-01-01 RX ORDER — ALBUTEROL SULFATE 2.5 MG/3ML
2.5 SOLUTION RESPIRATORY (INHALATION) EVERY 4 HOURS PRN
Status: DISCONTINUED | OUTPATIENT
Start: 2021-01-01 | End: 2021-01-01 | Stop reason: HOSPADM

## 2021-01-01 RX ORDER — CALCIUM CHLORIDE 100 MG/ML
1000 INJECTION INTRAVENOUS; INTRAVENTRICULAR ONCE
Status: DISCONTINUED | OUTPATIENT
Start: 2021-01-01 | End: 2021-01-01

## 2021-01-01 RX ORDER — HEPARIN SODIUM 1000 [USP'U]/ML
1000 INJECTION, SOLUTION INTRAVENOUS; SUBCUTANEOUS PRN
Status: DISCONTINUED | OUTPATIENT
Start: 2021-01-01 | End: 2021-01-01 | Stop reason: HOSPADM

## 2021-01-01 RX ORDER — DIPHENHYDRAMINE HYDROCHLORIDE 25 MG/1
5 TABLET ORAL DAILY
COMMUNITY

## 2021-01-01 RX ORDER — SPIRONOLACTONE 25 MG/1
12.5 TABLET ORAL DAILY
COMMUNITY

## 2021-01-01 RX ORDER — LACTULOSE 10 G/15ML
30 SOLUTION ORAL ONCE
Status: COMPLETED | OUTPATIENT
Start: 2021-01-01 | End: 2021-01-01

## 2021-01-01 RX ORDER — LANOLIN ALCOHOL/MO/W.PET/CERES
800 CREAM (GRAM) TOPICAL DAILY
Qty: 30 TABLET | DISCHARGE
Start: 2021-01-01

## 2021-01-01 RX ORDER — MIDODRINE HYDROCHLORIDE 5 MG/1
5 TABLET ORAL 3 TIMES DAILY
Status: DISCONTINUED | OUTPATIENT
Start: 2021-01-01 | End: 2021-01-01

## 2021-01-01 RX ORDER — ASPIRIN 81 MG/1
81 TABLET ORAL DAILY
Qty: 30 TABLET | Refills: 3 | DISCHARGE
Start: 2021-01-01

## 2021-01-01 RX ORDER — FUROSEMIDE 10 MG/ML
40 INJECTION INTRAMUSCULAR; INTRAVENOUS ONCE
Status: COMPLETED | OUTPATIENT
Start: 2021-01-01 | End: 2021-01-01

## 2021-01-01 RX ORDER — ASPIRIN 81 MG/1
81 TABLET ORAL DAILY
Status: DISCONTINUED | OUTPATIENT
Start: 2021-01-01 | End: 2021-01-01 | Stop reason: HOSPADM

## 2021-01-01 RX ORDER — CARVEDILOL 3.12 MG/1
3.12 TABLET ORAL 2 TIMES DAILY WITH MEALS
Status: ON HOLD | COMMUNITY
End: 2021-01-01 | Stop reason: HOSPADM

## 2021-01-01 RX ORDER — PREDNISONE 20 MG/1
10 TABLET ORAL DAILY
COMMUNITY

## 2021-01-01 RX ORDER — TORSEMIDE 10 MG/1
10 TABLET ORAL DAILY
Status: ON HOLD | COMMUNITY
End: 2021-01-01 | Stop reason: HOSPADM

## 2021-01-01 RX ORDER — METOLAZONE 5 MG/1
5 TABLET ORAL DAILY
Qty: 30 TABLET | Refills: 3 | DISCHARGE
Start: 2021-01-01

## 2021-01-01 RX ORDER — ACETAMINOPHEN 325 MG/1
650 TABLET ORAL EVERY 6 HOURS PRN
COMMUNITY

## 2021-01-01 RX ORDER — LANOLIN ALCOHOL/MO/W.PET/CERES
6 CREAM (GRAM) TOPICAL NIGHTLY
COMMUNITY

## 2021-01-01 RX ORDER — CALCIUM CHLORIDE 100 MG/ML
1000 INJECTION INTRAVENOUS; INTRAVENTRICULAR ONCE
Status: COMPLETED | OUTPATIENT
Start: 2021-01-01 | End: 2021-01-01

## 2021-01-01 RX ORDER — FLUTICASONE PROPIONATE 50 MCG
1 SPRAY, SUSPENSION (ML) NASAL DAILY
COMMUNITY

## 2021-01-01 RX ORDER — ALBUMIN (HUMAN) 12.5 G/50ML
25 SOLUTION INTRAVENOUS
Status: ACTIVE | OUTPATIENT
Start: 2021-01-01 | End: 2021-01-01

## 2021-01-01 RX ORDER — AMIODARONE HYDROCHLORIDE 200 MG/1
200 TABLET ORAL DAILY
Status: DISCONTINUED | OUTPATIENT
Start: 2021-01-01 | End: 2021-01-01 | Stop reason: HOSPADM

## 2021-01-01 RX ORDER — POTASSIUM CHLORIDE 20 MEQ/1
20 TABLET, EXTENDED RELEASE ORAL 2 TIMES DAILY WITH MEALS
Status: DISCONTINUED | OUTPATIENT
Start: 2021-01-01 | End: 2021-01-01 | Stop reason: HOSPADM

## 2021-01-01 RX ORDER — DEXTROSE MONOHYDRATE 25 G/50ML
25 INJECTION, SOLUTION INTRAVENOUS ONCE
Status: COMPLETED | OUTPATIENT
Start: 2021-01-01 | End: 2021-01-01

## 2021-01-01 RX ORDER — SODIUM CHLORIDE 9 MG/ML
INJECTION, SOLUTION INTRAVENOUS
Status: DISPENSED
Start: 2021-01-01 | End: 2021-01-01

## 2021-01-01 RX ORDER — SENNA PLUS 8.6 MG/1
1 TABLET ORAL DAILY
COMMUNITY

## 2021-01-01 RX ORDER — METOLAZONE 2.5 MG/1
5 TABLET ORAL DAILY
Status: DISCONTINUED | OUTPATIENT
Start: 2021-01-01 | End: 2021-01-01 | Stop reason: HOSPADM

## 2021-01-01 RX ORDER — SODIUM CHLORIDE 0.9 % (FLUSH) 0.9 %
5-40 SYRINGE (ML) INJECTION PRN
Status: DISCONTINUED | OUTPATIENT
Start: 2021-01-01 | End: 2021-01-01 | Stop reason: HOSPADM

## 2021-01-01 RX ORDER — MAGNESIUM 200 MG
400 TABLET ORAL DAILY
Status: ON HOLD | COMMUNITY
End: 2021-01-01 | Stop reason: HOSPADM

## 2021-01-01 RX ORDER — FUROSEMIDE 10 MG/ML
80 INJECTION INTRAMUSCULAR; INTRAVENOUS 2 TIMES DAILY
Status: DISCONTINUED | OUTPATIENT
Start: 2021-01-01 | End: 2021-01-01

## 2021-01-01 RX ORDER — AMIODARONE HYDROCHLORIDE 200 MG/1
200 TABLET ORAL DAILY
COMMUNITY

## 2021-01-01 RX ORDER — HEPARIN SODIUM 5000 [USP'U]/ML
5000 INJECTION, SOLUTION INTRAVENOUS; SUBCUTANEOUS EVERY 8 HOURS SCHEDULED
Status: DISCONTINUED | OUTPATIENT
Start: 2021-01-01 | End: 2021-01-01

## 2021-01-01 RX ORDER — METOLAZONE 2.5 MG/1
2.5 TABLET ORAL
Status: ON HOLD | COMMUNITY
End: 2021-01-01 | Stop reason: HOSPADM

## 2021-01-01 RX ORDER — ALLOPURINOL 100 MG/1
300 TABLET ORAL DAILY
COMMUNITY

## 2021-01-01 RX ORDER — NITROGLYCERIN 0.4 MG/1
0.4 TABLET SUBLINGUAL EVERY 5 MIN PRN
COMMUNITY

## 2021-01-01 RX ORDER — CALCIUM GLUCONATE 94 MG/ML
1000 INJECTION, SOLUTION INTRAVENOUS ONCE
Status: DISCONTINUED | OUTPATIENT
Start: 2021-01-01 | End: 2021-01-01 | Stop reason: CLARIF

## 2021-01-01 RX ORDER — SODIUM CHLORIDE 0.9 % (FLUSH) 0.9 %
5-40 SYRINGE (ML) INJECTION EVERY 12 HOURS SCHEDULED
Status: DISCONTINUED | OUTPATIENT
Start: 2021-01-01 | End: 2021-01-01 | Stop reason: HOSPADM

## 2021-01-01 RX ORDER — ALBUTEROL SULFATE 2.5 MG/3ML
2.5 SOLUTION RESPIRATORY (INHALATION) EVERY 6 HOURS PRN
Status: DISCONTINUED | OUTPATIENT
Start: 2021-01-01 | End: 2021-01-01

## 2021-01-01 RX ORDER — POTASSIUM CHLORIDE 7.45 MG/ML
10 INJECTION INTRAVENOUS
Status: COMPLETED | OUTPATIENT
Start: 2021-01-01 | End: 2021-01-01

## 2021-01-01 RX ORDER — ATORVASTATIN CALCIUM 20 MG/1
20 TABLET, FILM COATED ORAL DAILY
COMMUNITY

## 2021-01-01 RX ORDER — SODIUM CHLORIDE 0.9 % (FLUSH) 0.9 %
3 SYRINGE (ML) INJECTION EVERY 8 HOURS
Status: DISCONTINUED | OUTPATIENT
Start: 2021-01-01 | End: 2021-01-01

## 2021-01-01 RX ORDER — BUMETANIDE 2 MG/1
2 TABLET ORAL 2 TIMES DAILY
Qty: 30 TABLET | Refills: 3 | DISCHARGE
Start: 2021-01-01

## 2021-01-01 RX ORDER — HEPARIN SODIUM 5000 [USP'U]/ML
5000 INJECTION, SOLUTION INTRAVENOUS; SUBCUTANEOUS EVERY 8 HOURS SCHEDULED
Status: DISCONTINUED | OUTPATIENT
Start: 2021-01-01 | End: 2021-01-01 | Stop reason: HOSPADM

## 2021-01-01 RX ORDER — DEXTROSE MONOHYDRATE 25 G/50ML
12.5 INJECTION, SOLUTION INTRAVENOUS PRN
Status: DISCONTINUED | OUTPATIENT
Start: 2021-01-01 | End: 2021-01-01 | Stop reason: HOSPADM

## 2021-01-01 RX ORDER — POTASSIUM CHLORIDE 1500 MG/1
30 TABLET, FILM COATED, EXTENDED RELEASE ORAL
COMMUNITY

## 2021-01-01 RX ORDER — BUMETANIDE 2 MG/1
2 TABLET ORAL 2 TIMES DAILY
Status: DISCONTINUED | OUTPATIENT
Start: 2021-01-01 | End: 2021-01-01 | Stop reason: HOSPADM

## 2021-01-01 RX ORDER — MIDODRINE HYDROCHLORIDE 10 MG/1
10 TABLET ORAL 3 TIMES DAILY
Qty: 90 TABLET | Refills: 3 | DISCHARGE
Start: 2021-01-01

## 2021-01-01 RX ORDER — LACTULOSE 10 G/15ML
20 SOLUTION ORAL ONCE
Status: COMPLETED | OUTPATIENT
Start: 2021-01-01 | End: 2021-01-01

## 2021-01-01 RX ORDER — FOLIC ACID 1 MG/1
1 TABLET ORAL DAILY
COMMUNITY

## 2021-01-01 RX ORDER — MECLIZINE HCL 12.5 MG/1
12.5 TABLET ORAL 2 TIMES DAILY
Status: DISCONTINUED | OUTPATIENT
Start: 2021-01-01 | End: 2021-01-01 | Stop reason: HOSPADM

## 2021-01-01 RX ORDER — SODIUM POLYSTYRENE SULFONATE 15 G/60ML
30 SUSPENSION ORAL; RECTAL ONCE
Status: COMPLETED | OUTPATIENT
Start: 2021-01-01 | End: 2021-01-01

## 2021-01-01 RX ORDER — SODIUM CHLORIDE 9 MG/ML
25 INJECTION, SOLUTION INTRAVENOUS PRN
Status: DISCONTINUED | OUTPATIENT
Start: 2021-01-01 | End: 2021-01-01 | Stop reason: HOSPADM

## 2021-01-01 RX ORDER — ACETAMINOPHEN 325 MG/1
650 TABLET ORAL EVERY 6 HOURS PRN
Status: DISCONTINUED | OUTPATIENT
Start: 2021-01-01 | End: 2021-01-01 | Stop reason: HOSPADM

## 2021-01-01 RX ORDER — DEXTROSE MONOHYDRATE 50 MG/ML
100 INJECTION, SOLUTION INTRAVENOUS PRN
Status: DISCONTINUED | OUTPATIENT
Start: 2021-01-01 | End: 2021-01-01 | Stop reason: HOSPADM

## 2021-01-01 RX ADMIN — MIDODRINE HYDROCHLORIDE 5 MG: 5 TABLET ORAL at 09:29

## 2021-01-01 RX ADMIN — HEPARIN SODIUM 5000 UNITS: 5000 INJECTION INTRAVENOUS; SUBCUTANEOUS at 13:33

## 2021-01-01 RX ADMIN — FUROSEMIDE 80 MG: 10 INJECTION, SOLUTION INTRAMUSCULAR; INTRAVENOUS at 17:46

## 2021-01-01 RX ADMIN — ATORVASTATIN CALCIUM 40 MG: 40 TABLET, FILM COATED ORAL at 21:25

## 2021-01-01 RX ADMIN — METOLAZONE 5 MG: 2.5 TABLET ORAL at 11:34

## 2021-01-01 RX ADMIN — Medication 10 ML: at 21:29

## 2021-01-01 RX ADMIN — POTASSIUM CHLORIDE 10 MEQ: 7.46 INJECTION, SOLUTION INTRAVENOUS at 14:26

## 2021-01-01 RX ADMIN — FUROSEMIDE 80 MG: 10 INJECTION, SOLUTION INTRAMUSCULAR; INTRAVENOUS at 19:01

## 2021-01-01 RX ADMIN — ASPIRIN 81 MG: 81 TABLET, COATED ORAL at 08:43

## 2021-01-01 RX ADMIN — HEPARIN SODIUM 5000 UNITS: 5000 INJECTION INTRAVENOUS; SUBCUTANEOUS at 21:02

## 2021-01-01 RX ADMIN — POTASSIUM CHLORIDE 20 MEQ: 20 TABLET, EXTENDED RELEASE ORAL at 12:29

## 2021-01-01 RX ADMIN — FUROSEMIDE 40 MG: 10 INJECTION, SOLUTION INTRAMUSCULAR; INTRAVENOUS at 19:07

## 2021-01-01 RX ADMIN — Medication 10 ML: at 20:32

## 2021-01-01 RX ADMIN — HEPARIN SODIUM 5000 UNITS: 5000 INJECTION INTRAVENOUS; SUBCUTANEOUS at 22:53

## 2021-01-01 RX ADMIN — MIDODRINE HYDROCHLORIDE 5 MG: 5 TABLET ORAL at 20:31

## 2021-01-01 RX ADMIN — ATORVASTATIN CALCIUM 40 MG: 40 TABLET, FILM COATED ORAL at 22:53

## 2021-01-01 RX ADMIN — SODIUM CHLORIDE, PRESERVATIVE FREE 3 ML: 5 INJECTION INTRAVENOUS at 20:38

## 2021-01-01 RX ADMIN — IRON SUCROSE 200 MG: 20 INJECTION, SOLUTION INTRAVENOUS at 15:14

## 2021-01-01 RX ADMIN — ASPIRIN 81 MG: 81 TABLET, COATED ORAL at 09:25

## 2021-01-01 RX ADMIN — ALBUTEROL SULFATE 2.5 MG: 2.5 SOLUTION RESPIRATORY (INHALATION) at 20:05

## 2021-01-01 RX ADMIN — SODIUM POLYSTYRENE SULFONATE 30 G: 15 SUSPENSION ORAL; RECTAL at 20:37

## 2021-01-01 RX ADMIN — FUROSEMIDE 80 MG: 10 INJECTION, SOLUTION INTRAMUSCULAR; INTRAVENOUS at 09:25

## 2021-01-01 RX ADMIN — IRON SUCROSE 200 MG: 20 INJECTION, SOLUTION INTRAVENOUS at 19:01

## 2021-01-01 RX ADMIN — Medication 10 ML: at 08:53

## 2021-01-01 RX ADMIN — SODIUM BICARBONATE 50 MEQ: 84 INJECTION INTRAVENOUS at 20:31

## 2021-01-01 RX ADMIN — ASPIRIN 81 MG: 81 TABLET, COATED ORAL at 10:41

## 2021-01-01 RX ADMIN — NITROGLYCERIN 0.5 INCH: 20 OINTMENT TOPICAL at 19:07

## 2021-01-01 RX ADMIN — ASPIRIN 81 MG: 81 TABLET, COATED ORAL at 10:27

## 2021-01-01 RX ADMIN — POTASSIUM CHLORIDE 20 MEQ: 20 TABLET, EXTENDED RELEASE ORAL at 18:02

## 2021-01-01 RX ADMIN — Medication 10 ML: at 22:58

## 2021-01-01 RX ADMIN — AMIODARONE HYDROCHLORIDE 200 MG: 200 TABLET ORAL at 10:41

## 2021-01-01 RX ADMIN — Medication 10 ML: at 10:41

## 2021-01-01 RX ADMIN — MIDODRINE HYDROCHLORIDE 10 MG: 5 TABLET ORAL at 06:57

## 2021-01-01 RX ADMIN — MECLIZINE 12.5 MG: 12.5 TABLET ORAL at 06:57

## 2021-01-01 RX ADMIN — AMIODARONE HYDROCHLORIDE 200 MG: 200 TABLET ORAL at 14:34

## 2021-01-01 RX ADMIN — ATORVASTATIN CALCIUM 40 MG: 40 TABLET, FILM COATED ORAL at 20:54

## 2021-01-01 RX ADMIN — ATORVASTATIN CALCIUM 40 MG: 40 TABLET, FILM COATED ORAL at 20:31

## 2021-01-01 RX ADMIN — MECLIZINE 12.5 MG: 12.5 TABLET ORAL at 18:05

## 2021-01-01 RX ADMIN — HEPARIN SODIUM 5000 UNITS: 5000 INJECTION INTRAVENOUS; SUBCUTANEOUS at 06:36

## 2021-01-01 RX ADMIN — MECLIZINE 12.5 MG: 12.5 TABLET ORAL at 17:59

## 2021-01-01 RX ADMIN — BUMETANIDE 2 MG: 2 TABLET ORAL at 22:53

## 2021-01-01 RX ADMIN — Medication 10 ML: at 11:33

## 2021-01-01 RX ADMIN — ASPIRIN 81 MG: 81 TABLET, COATED ORAL at 11:45

## 2021-01-01 RX ADMIN — FUROSEMIDE 80 MG: 10 INJECTION, SOLUTION INTRAMUSCULAR; INTRAVENOUS at 10:46

## 2021-01-01 RX ADMIN — HEPARIN SODIUM 5000 UNITS: 5000 INJECTION INTRAVENOUS; SUBCUTANEOUS at 20:41

## 2021-01-01 RX ADMIN — Medication 800 MG: at 11:45

## 2021-01-01 RX ADMIN — BUMETANIDE 2 MG: 2 TABLET ORAL at 11:45

## 2021-01-01 RX ADMIN — HEPARIN SODIUM 5000 UNITS: 5000 INJECTION INTRAVENOUS; SUBCUTANEOUS at 14:04

## 2021-01-01 RX ADMIN — MIDODRINE HYDROCHLORIDE 10 MG: 5 TABLET ORAL at 14:35

## 2021-01-01 RX ADMIN — AMIODARONE HYDROCHLORIDE 200 MG: 200 TABLET ORAL at 09:25

## 2021-01-01 RX ADMIN — MIDODRINE HYDROCHLORIDE 10 MG: 5 TABLET ORAL at 13:35

## 2021-01-01 RX ADMIN — MIDODRINE HYDROCHLORIDE 10 MG: 5 TABLET ORAL at 22:53

## 2021-01-01 RX ADMIN — AMIODARONE HYDROCHLORIDE 200 MG: 200 TABLET ORAL at 06:59

## 2021-01-01 RX ADMIN — MIDODRINE HYDROCHLORIDE 10 MG: 5 TABLET ORAL at 11:34

## 2021-01-01 RX ADMIN — MIDODRINE HYDROCHLORIDE 5 MG: 5 TABLET ORAL at 13:35

## 2021-01-01 RX ADMIN — Medication 10 ML: at 13:19

## 2021-01-01 RX ADMIN — BUMETANIDE 2 MG: 2 TABLET ORAL at 21:25

## 2021-01-01 RX ADMIN — DEXTROSE MONOHYDRATE 25 G: 25 INJECTION, SOLUTION INTRAVENOUS at 20:30

## 2021-01-01 RX ADMIN — MIDODRINE HYDROCHLORIDE 10 MG: 5 TABLET ORAL at 14:15

## 2021-01-01 RX ADMIN — SODIUM ZIRCONIUM CYCLOSILICATE 10 G: 10 POWDER, FOR SUSPENSION ORAL at 02:55

## 2021-01-01 RX ADMIN — IRON SUCROSE 200 MG: 20 INJECTION, SOLUTION INTRAVENOUS at 14:15

## 2021-01-01 RX ADMIN — HEPARIN SODIUM 5000 UNITS: 5000 INJECTION INTRAVENOUS; SUBCUTANEOUS at 14:15

## 2021-01-01 RX ADMIN — HEPARIN SODIUM 5000 UNITS: 5000 INJECTION INTRAVENOUS; SUBCUTANEOUS at 13:32

## 2021-01-01 RX ADMIN — HEPARIN SODIUM 5000 UNITS: 5000 INJECTION INTRAVENOUS; SUBCUTANEOUS at 05:13

## 2021-01-01 RX ADMIN — CHLOROTHIAZIDE SODIUM 500 MG: 500 INJECTION, POWDER, LYOPHILIZED, FOR SOLUTION INTRAVENOUS at 13:44

## 2021-01-01 RX ADMIN — BUMETANIDE 2 MG: 2 TABLET ORAL at 11:34

## 2021-01-01 RX ADMIN — Medication 10 ML: at 08:45

## 2021-01-01 RX ADMIN — Medication 10 ML: at 09:25

## 2021-01-01 RX ADMIN — METOLAZONE 5 MG: 2.5 TABLET ORAL at 12:22

## 2021-01-01 RX ADMIN — MIDODRINE HYDROCHLORIDE 10 MG: 5 TABLET ORAL at 20:54

## 2021-01-01 RX ADMIN — LACTULOSE 20 G: 20 SOLUTION ORAL at 08:44

## 2021-01-01 RX ADMIN — Medication 10 ML: at 10:55

## 2021-01-01 RX ADMIN — AMIODARONE HYDROCHLORIDE 200 MG: 200 TABLET ORAL at 11:34

## 2021-01-01 RX ADMIN — FUROSEMIDE 80 MG: 10 INJECTION, SOLUTION INTRAMUSCULAR; INTRAVENOUS at 08:37

## 2021-01-01 RX ADMIN — POTASSIUM CHLORIDE 20 MEQ: 20 TABLET, EXTENDED RELEASE ORAL at 06:59

## 2021-01-01 RX ADMIN — HEPARIN SODIUM 5000 UNITS: 5000 INJECTION INTRAVENOUS; SUBCUTANEOUS at 21:25

## 2021-01-01 RX ADMIN — METOLAZONE 2.5 MG: 2.5 TABLET ORAL at 10:53

## 2021-01-01 RX ADMIN — BUMETANIDE 2 MG: 2 TABLET ORAL at 11:51

## 2021-01-01 RX ADMIN — CALCIUM GLUCONATE 1000 MG: 98 INJECTION, SOLUTION INTRAVENOUS at 02:54

## 2021-01-01 RX ADMIN — METOLAZONE 2.5 MG: 2.5 TABLET ORAL at 10:28

## 2021-01-01 RX ADMIN — HEPARIN SODIUM 5000 UNITS: 5000 INJECTION INTRAVENOUS; SUBCUTANEOUS at 06:14

## 2021-01-01 RX ADMIN — FUROSEMIDE 80 MG: 10 INJECTION, SOLUTION INTRAMUSCULAR; INTRAVENOUS at 17:48

## 2021-01-01 RX ADMIN — POTASSIUM CHLORIDE 10 MEQ: 7.46 INJECTION, SOLUTION INTRAVENOUS at 13:16

## 2021-01-01 RX ADMIN — AMIODARONE HYDROCHLORIDE 200 MG: 200 TABLET ORAL at 10:28

## 2021-01-01 RX ADMIN — HEPARIN SODIUM 5000 UNITS: 5000 INJECTION INTRAVENOUS; SUBCUTANEOUS at 22:00

## 2021-01-01 RX ADMIN — MIDODRINE HYDROCHLORIDE 5 MG: 5 TABLET ORAL at 21:09

## 2021-01-01 RX ADMIN — POTASSIUM CHLORIDE 20 MEQ: 20 TABLET, EXTENDED RELEASE ORAL at 11:34

## 2021-01-01 RX ADMIN — LACTULOSE 30 G: 20 SOLUTION ORAL at 11:45

## 2021-01-01 RX ADMIN — IRON SUCROSE 200 MG: 20 INJECTION, SOLUTION INTRAVENOUS at 13:55

## 2021-01-01 RX ADMIN — METOLAZONE 5 MG: 2.5 TABLET ORAL at 08:43

## 2021-01-01 RX ADMIN — MIDODRINE HYDROCHLORIDE 5 MG: 5 TABLET ORAL at 10:28

## 2021-01-01 RX ADMIN — POTASSIUM CHLORIDE 20 MEQ: 20 TABLET, EXTENDED RELEASE ORAL at 17:59

## 2021-01-01 RX ADMIN — Medication 10 ML: at 22:18

## 2021-01-01 RX ADMIN — MIDODRINE HYDROCHLORIDE 10 MG: 5 TABLET ORAL at 14:03

## 2021-01-01 RX ADMIN — HEPARIN SODIUM 5000 UNITS: 5000 INJECTION INTRAVENOUS; SUBCUTANEOUS at 06:19

## 2021-01-01 RX ADMIN — EPOETIN ALFA-EPBX 10000 UNITS: 10000 INJECTION, SOLUTION INTRAVENOUS; SUBCUTANEOUS at 23:11

## 2021-01-01 RX ADMIN — POTASSIUM CHLORIDE 20 MEQ: 20 TABLET, EXTENDED RELEASE ORAL at 06:57

## 2021-01-01 RX ADMIN — MIDODRINE HYDROCHLORIDE 10 MG: 5 TABLET ORAL at 13:55

## 2021-01-01 RX ADMIN — INSULIN HUMAN 5 UNITS: 100 INJECTION, SOLUTION PARENTERAL at 20:27

## 2021-01-01 RX ADMIN — HEPARIN SODIUM 5000 UNITS: 5000 INJECTION INTRAVENOUS; SUBCUTANEOUS at 06:16

## 2021-01-01 RX ADMIN — BISACODYL 10 MG: 5 TABLET, COATED ORAL at 11:45

## 2021-01-01 RX ADMIN — HEPARIN SODIUM 5000 UNITS: 5000 INJECTION INTRAVENOUS; SUBCUTANEOUS at 14:34

## 2021-01-01 RX ADMIN — MIDODRINE HYDROCHLORIDE 10 MG: 5 TABLET ORAL at 08:43

## 2021-01-01 RX ADMIN — HEPARIN SODIUM 5000 UNITS: 5000 INJECTION INTRAVENOUS; SUBCUTANEOUS at 13:54

## 2021-01-01 RX ADMIN — ATORVASTATIN CALCIUM 40 MG: 40 TABLET, FILM COATED ORAL at 19:00

## 2021-01-01 RX ADMIN — BUMETANIDE 2 MG: 2 TABLET ORAL at 19:00

## 2021-01-01 RX ADMIN — CALCIUM CHLORIDE 1000 MG: 100 INJECTION INTRAVENOUS; INTRAVENTRICULAR at 20:31

## 2021-01-01 RX ADMIN — DEXTROSE MONOHYDRATE 25 G: 25 INJECTION, SOLUTION INTRAVENOUS at 02:55

## 2021-01-01 RX ADMIN — INSULIN HUMAN 10 UNITS: 100 INJECTION, SOLUTION PARENTERAL at 02:54

## 2021-01-01 RX ADMIN — MICONAZOLE NITRATE: 2 POWDER TOPICAL at 15:47

## 2021-01-01 RX ADMIN — HEPARIN SODIUM 5000 UNITS: 5000 INJECTION INTRAVENOUS; SUBCUTANEOUS at 06:13

## 2021-01-01 RX ADMIN — HEPARIN SODIUM 5000 UNITS: 5000 INJECTION INTRAVENOUS; SUBCUTANEOUS at 14:35

## 2021-01-01 RX ADMIN — BUMETANIDE 2 MG: 2 TABLET ORAL at 08:43

## 2021-01-01 RX ADMIN — MIDODRINE HYDROCHLORIDE 10 MG: 5 TABLET ORAL at 10:41

## 2021-01-01 RX ADMIN — Medication 10 ML: at 21:43

## 2021-01-01 RX ADMIN — HEPARIN SODIUM 5000 UNITS: 5000 INJECTION INTRAVENOUS; SUBCUTANEOUS at 20:31

## 2021-01-01 RX ADMIN — Medication 10 ML: at 21:14

## 2021-01-01 RX ADMIN — MIDODRINE HYDROCHLORIDE 5 MG: 5 TABLET ORAL at 13:32

## 2021-01-01 RX ADMIN — AMIODARONE HYDROCHLORIDE 200 MG: 200 TABLET ORAL at 08:43

## 2021-01-01 RX ADMIN — POTASSIUM CHLORIDE 20 MEQ: 20 TABLET, EXTENDED RELEASE ORAL at 18:04

## 2021-01-01 ASSESSMENT — ENCOUNTER SYMPTOMS
WHEEZING: 0
CHEST TIGHTNESS: 0
VOMITING: 0
RESPIRATORY NEGATIVE: 1
WHEEZING: 0
COLOR CHANGE: 0
ABDOMINAL PAIN: 0
VOICE CHANGE: 0
ANAL BLEEDING: 0
COUGH: 1
BLOOD IN STOOL: 0
CONSTIPATION: 0
STRIDOR: 0
RECTAL PAIN: 0
COUGH: 0
EYES NEGATIVE: 1
TROUBLE SWALLOWING: 0
COLOR CHANGE: 0
NAUSEA: 0
CHEST TIGHTNESS: 0
STRIDOR: 0
EYES NEGATIVE: 1
RHINORRHEA: 0
PHOTOPHOBIA: 0
RESPIRATORY NEGATIVE: 1
CHEST TIGHTNESS: 0
SHORTNESS OF BREATH: 1
SORE THROAT: 0
STRIDOR: 0
CHOKING: 0
STRIDOR: 0
BLOOD IN STOOL: 0
BACK PAIN: 0
SHORTNESS OF BREATH: 0
WHEEZING: 0
EYES NEGATIVE: 1
ALLERGIC/IMMUNOLOGIC NEGATIVE: 1
BACK PAIN: 0
GASTROINTESTINAL NEGATIVE: 1
ABDOMINAL PAIN: 0
BLOOD IN STOOL: 0
ANAL BLEEDING: 0
APNEA: 0
EYES NEGATIVE: 1
RESPIRATORY NEGATIVE: 1
COLOR CHANGE: 0
CHEST TIGHTNESS: 0
DIARRHEA: 0
SORE THROAT: 0
WHEEZING: 0
COUGH: 1
NAUSEA: 0
EYES NEGATIVE: 1
EYE PAIN: 0
CHEST TIGHTNESS: 0
EYE DISCHARGE: 0
SHORTNESS OF BREATH: 0
CONSTIPATION: 0
ABDOMINAL PAIN: 0
SHORTNESS OF BREATH: 0
BLOOD IN STOOL: 0
SHORTNESS OF BREATH: 1
WHEEZING: 0
SORE THROAT: 0
COLOR CHANGE: 0
ABDOMINAL DISTENTION: 0
EYE DISCHARGE: 0
EYES NEGATIVE: 1
GASTROINTESTINAL NEGATIVE: 1
VOICE CHANGE: 0
WHEEZING: 0
EYE DISCHARGE: 0
RESPIRATORY NEGATIVE: 1
DIARRHEA: 0
BLOOD IN STOOL: 0
NAUSEA: 0
ABDOMINAL DISTENTION: 0
TROUBLE SWALLOWING: 0
APNEA: 0
APNEA: 0
CONSTIPATION: 1
EYE REDNESS: 0
VOICE CHANGE: 0
STRIDOR: 0
NAUSEA: 0
SHORTNESS OF BREATH: 0
VOMITING: 0
DIARRHEA: 0
BACK PAIN: 0
NAUSEA: 0
EYE DISCHARGE: 0
EYE DISCHARGE: 0
WHEEZING: 0
ABDOMINAL DISTENTION: 0
PHOTOPHOBIA: 0
RECTAL PAIN: 0
BLOOD IN STOOL: 0
SHORTNESS OF BREATH: 0
FACIAL SWELLING: 0
COUGH: 0
GASTROINTESTINAL NEGATIVE: 1
COUGH: 0
ANAL BLEEDING: 0
SHORTNESS OF BREATH: 1
ABDOMINAL PAIN: 0
RECTAL PAIN: 0
EYE REDNESS: 0
GASTROINTESTINAL NEGATIVE: 1
GASTROINTESTINAL NEGATIVE: 1
DIARRHEA: 0
VOMITING: 0
COUGH: 0
ABDOMINAL DISTENTION: 0
RESPIRATORY NEGATIVE: 1
EYES NEGATIVE: 1
COUGH: 0
VOMITING: 0
STRIDOR: 0
CHEST TIGHTNESS: 0
CHOKING: 0
TROUBLE SWALLOWING: 0
SINUS PAIN: 0
BACK PAIN: 0
SORE THROAT: 0
NAUSEA: 0
GASTROINTESTINAL NEGATIVE: 1
NAUSEA: 0
COUGH: 0
CHOKING: 0
CONSTIPATION: 0

## 2021-01-01 ASSESSMENT — PAIN SCALES - GENERAL
PAINLEVEL_OUTOF10: 0

## 2021-01-01 ASSESSMENT — PAIN SCALES - WONG BAKER
WONGBAKER_NUMERICALRESPONSE: 0

## 2021-08-27 PROBLEM — I50.9 HEART FAILURE (HCC): Status: ACTIVE | Noted: 2021-01-01

## 2021-08-27 PROBLEM — E78.5 HLD (HYPERLIPIDEMIA): Status: ACTIVE | Noted: 2021-01-01

## 2021-08-27 PROBLEM — I10 HTN (HYPERTENSION): Status: ACTIVE | Noted: 2021-01-01

## 2021-08-27 PROBLEM — N18.9 ACUTE KIDNEY INJURY SUPERIMPOSED ON CKD (HCC): Status: ACTIVE | Noted: 2021-01-01

## 2021-08-27 PROBLEM — I48.0 PAF (PAROXYSMAL ATRIAL FIBRILLATION) (HCC): Status: ACTIVE | Noted: 2021-01-01

## 2021-08-27 PROBLEM — N17.9 ACUTE KIDNEY INJURY SUPERIMPOSED ON CKD (HCC): Status: ACTIVE | Noted: 2021-01-01

## 2021-08-27 PROBLEM — I25.10 CAD (CORONARY ARTERY DISEASE): Status: ACTIVE | Noted: 2021-01-01

## 2021-08-27 PROBLEM — E87.5 HYPERKALEMIA: Status: ACTIVE | Noted: 2021-01-01

## 2021-08-27 NOTE — ED NOTES
Pt Sp02 92% on RA, 02 2l via NC applied.  Dr. Ephraim Ramirez aware     Wilmar Mishra, GHADA  08/27/21 1889

## 2021-08-27 NOTE — ED PROVIDER NOTES
[address] [facility] ED  EMERGENCY DEPARTMENT ENCOUNTER      Pt Name: Marcus Martin  MRN: [id number]  Gisellgfneil 1940  Date of evaluation: 8/27/2021  Provider: Apoorva Anthony DO    CHIEF COMPLAINT       Chief Complaint   Patient presents with    Shortness of Breath     Chief complaint: Shortness of breath  History of chief complaint: This 44-year-old female presents the emergency department complaining of shortness of breath. Patient states it has been going on \"forever\" patient states she has had trouble for the last 8 years states that intermittently gets worse. Patient reports multiple previous episodes with congestive heart failure has to come in and get fluid taken off. Patient states she did stop her Lasix at home for 3 or 4 days this past week because of a medication reaction potential with the antibiotic given to her by her kidney doctor for urine infection. Patient states she has been on it again for the last 3 days or so but breathing is feeling worse. Patient states it is worse when she tries to lay back. Patient states some associated cough nonproductive no sore throat fevers or chills no chest pain nausea vomiting or diaphoresis no diarrhea. No abdominal pain. Patient denies any dysuria hematuria frequency or urgency. Patient states chronic leg swelling not really worse from previous. Patient reports a history of coronary artery disease with previous stent 12 years ago. Pacemaker. No home oxygen    Nursing Notes were reviewed. REVIEW OF SYSTEMS    (2-9 systems for level 4, 10 or more for level 5)     Review of Systems   Constitutional: Negative for appetite change, diaphoresis and fever. HENT: Negative for congestion, sinus pain and sore throat. Eyes: Negative for discharge and redness. Respiratory: Positive for cough and shortness of breath. Cardiovascular: Positive for leg swelling. Negative for chest pain and palpitations.    Gastrointestinal: Negative for abdominal pain, diarrhea, nausea and vomiting. Genitourinary: Negative for dysuria, flank pain and frequency. Musculoskeletal: Negative for back pain and myalgias. Skin: Negative for color change and rash. Neurological: Negative for dizziness, weakness and headaches. Hematological: Negative for adenopathy. Except as noted above the remainder of the review of systems was reviewed and negative. PAST MEDICAL HISTORY   No past medical history on file. SURGICAL HISTORY     No past surgical history on file. CURRENT MEDICATIONS       Previous Medications    No medications on file       ALLERGIES     Codeine    FAMILY HISTORY     No family history on file. SOCIAL HISTORY       Social History     Socioeconomic History    Marital status:      Spouse name: Not on file    Number of children: Not on file    Years of education: Not on file    Highest education level: Not on file   Occupational History    Not on file   Tobacco Use    Smoking status: Not on file   Substance and Sexual Activity    Alcohol use: Not on file    Drug use: Not on file    Sexual activity: Not on file   Other Topics Concern    Not on file   Social History Narrative    Not on file     Social Determinants of Health     Financial Resource Strain:     Difficulty of Paying Living Expenses:    Food Insecurity:     Worried About Running Out of Food in the Last Year:     [address] in the Last Year:    Transportation Needs:     Lack of Transportation (Medical):      Lack of Transportation (Non-Medical):    Physical Activity:     Days of Exercise per Week:     Minutes of Exercise per Session:    Stress:     Feeling of Stress :    Social Connections:     Frequency of Communication with Friends and Family:     Frequency of Social Gatherings with Friends and Family:     Attends Mosque Services:     Active Member of Clubs or Organizations:     Attends Club or Organization Meetings:     Marital Status: Intimate Partner Violence:     Fear of Current or Ex-Partner:     Emotionally Abused:     Physically Abused:     Sexually Abused:            PHYSICAL EXAM    (up to 7 for level 4, 8 or more for level 5)     ED Triage Vitals [08/27/21 1851]   BP Temp Temp src Pulse Resp SpO2 Height Weight   (!) 152/127 97.4 °F (36.3 °C) -- 60 16 92 % 5' 5\" (1.651 m) 243 lb (110.2 kg)       Physical Exam   General appearance: Patient is awake alert interactive appropriate nontoxic in no acute distress, some audible rales at bedside with intermittent moist cough,, no distress speaking in full sentences  Head is atraumatic normocephalic  Eyes pupils are equal and reactive sclera white conjunctive are pink  Oral pharyngeal cavity is pink with good moisture, no exudates or ulcerations no asymmetry, the airway is widely patent  Neck: Supple no meningeal signs no adenopathy no JVD  Heart: Regular rate and rhythm heart sounds are limited but no gross murmurs rubs or clicks  Lungs: Breath sounds are coarse with some audible Rales moist cough intermittently poor air movement throughout no active wheezes no use of accessory muscles or conversational dyspnea. Pulse ox 92% on room air placed on nasal cannula O2   abdomen: Obese, soft nontender with good bowel sounds no rebound rigidity or guarding no firm or pulsatile masses, no gross distention, femoral pulses full and symmetric  Back: Nontender to palpation no costovertebral angle tenderness  Skin: Warm and dry without rashes  Lower extremities: Bilateral peripheral edema wearing TISHA hose no gross asymmetry or calf tenderness. DIAGNOSTIC RESULTS     EKG: All EKG's are interpreted by the Emergency Department Physician who either signs or Co-signs this chart in the absence of a cardiologist.    EKG interpreted by ED physician indication shortness of breath: Patient paced at 106 with limited morphology interpretation secondary to paced pattern.     RADIOLOGY:   Non-plain film images such dictation. EMERGENCY DEPARTMENT COURSE and DIFFERENTIAL DIAGNOSIS/MDM:   Vitals:    Vitals:    08/27/21 1900 08/27/21 1915 08/27/21 1930 08/27/21 1945   BP: (!) 111/46 106/76 (!) 102/46 (!) 93/49   Pulse: 60 60 60 60   Resp: 23 20 24 26   Temp:       SpO2: 98% 95% 100% 99%   Weight:       Height:         Treatment and course: Patient was placed on a cardiac monitor with continuous pulse ox monitoring and IV was established O2 2 L nasal cannula was applied for a pulse ox of 92% with improvement. Nitroglycerin half inch was applied and Lasix 40 mg IV was initiated. Potassium coming back significantly elevated at 8.0 reported no hemolysis, repeat potassium was ordered review of labs does reveal worsening renal failure with creatinine up to 3.1. Calcium chloride 1 g slow IV was ordered along with albuterol aerosol, sodium bicarb 50 mEq IV D50 25 g with insulin 5 units IV and Kayexalate 30 g p.o. ordered. Au catheter ordered. Pacemaker check was performed I did receive a called reporting no episodes of recent dysrhythmia normal sinus rhythm ventricular paced. Coordination of CARE: A call was placed out to Dr. Tobin Viera Case was discussed patient will be admitted to the ICU with consult to nephrology    Coordination of CARE: I did place a call out to nephrology I spoke with Dr. Morene Alpers who advised to place a Au catheter admit the patient to the ICU will follow on her repeat potassium and dialyze if needed. CRITICAL CARE TIME   Total Critical Care time was 40 minutes, excluding separately reportable procedures. This potentially unstable patient required approximately 40 minutes of critical care time during the emergency department course for initial assessment intervention multiple repeat evaluations intervention and coordination of care. FINAL IMPRESSION      1. Acute congestive heart failure, unspecified heart failure type ([address]    2. Hyperkalemia    3.  Acute renal failure, unspecified acute renal failure type [facility])          DISPOSITION/PLAN   DISPOSITION    Patient awaiting bed placement in improved but guarded condition. PATIENT REFERRED TO:  No follow-up provider specified. DISCHARGE MEDICATIONS:  New Prescriptions    No medications on file     Controlled Substances Monitoring:     No flowsheet data found.     (Please note that portions of this note were completed with a voice recognition program.  Efforts were made to edit the dictations but occasionally words are mis-transcribed.)    Cecy Aguilar DO (electronically signed)  Attending Emergency Physician            Cecy Aguilar DO  08/30/21 8994

## 2021-08-27 NOTE — ED NOTES
Bed: 31  Expected date:   Expected time:   Means of arrival:   Comments:  80f sob, BLE edema, a&ox4, 71, paced, 100/60, 18, duoneb x 2, IV and LABS     Patti Bains RN  08/27/21 1745

## 2021-08-28 NOTE — PROGRESS NOTES
See note from earlier today. Update:  Had HD.  k 5. Going to floor.   Making urine but not that much  I called son Wicho Marc and left message to call me back to update him     - check renal u/s  - add a dose of diuril

## 2021-08-28 NOTE — CONSULTS
[facility]. Nephrology  Consult Note           Reason for Consult:  Hyperkalemia, mazin, ckd stage 3  Requesting Physician:  Dr. Faye Alvarez    Chief Complaint:  Shortness of breath  History Obtained From:  patient, electronic medical record    History of Present Ilness:    [de-identified]y.o. year old female admitted with SOB. Has DM (borderline), CAD, HTN, CHF. Follows with [facility]. Sees nephrology and rheumatology. Previous imaging of kidneys ok. UA has been neg for protein and blood. No NSAID use. No recent dye exposures. No ace/arb. Does take lasix and metolazone. Recently was on abx. She stopped her diuretics for a bit. Restarted them. Was taking potassium. Came in with increasing sob. Found to have k 8.7. Treated and still 8.5. Au placed. 40 mg lasix given. Only 150 cc uo. She is agreeable to HD. Past Medical History:    No past medical history on file. Past Surgical History:    No past surgical history on file. Home Medications:    No current facility-administered medications on file prior to encounter.      Current Outpatient Medications on File Prior to Encounter   Medication Sig Dispense Refill    acetaminophen (TYLENOL) 325 MG tablet Take 650 mg by mouth every 6 hours as needed for Pain      allopurinol (ZYLOPRIM) 100 MG tablet Take 300 mg by mouth daily      amiodarone (CORDARONE) 200 MG tablet Take 200 mg by mouth daily      apixaban (ELIQUIS) 5 MG TABS tablet Take 5 mg by mouth 2 times daily      atorvastatin (LIPITOR) 20 MG tablet Take 20 mg by mouth daily      Biotin 5 MG CAPS Take by mouth daily      carvedilol (COREG) 3.125 MG tablet Take 3.125 mg by mouth 2 times daily (with meals)      Cholecalciferol (VITAMIN D3) 1.25 MG (03293 UT) CAPS Take 1,000 Units by mouth 2 times daily      fluticasone (FLONASE) 50 MCG/ACT nasal spray 1 spray by Each Nostril route daily      folic acid (FOLVITE) 1 MG tablet Take 1 mg by mouth daily      magnesium 200 MG TABS tablet Take 400 mg by mouth daily      metOLazone (ZAROXOLYN) 2.5 MG tablet Take 2.5 mg by mouth Take 2 times a week prior to torsemide on mondays and thursdays      nitroGLYCERIN (NITROSTAT) 0.4 MG SL tablet Place 0.4 mg under the tongue every 5 minutes as needed for Chest pain up to max of 3 total doses. If no relief after 1 dose, call 911.  potassium chloride (KLOR-CON M) 20 MEQ TBCR extended release tablet Take 40 mEq by mouth daily (with breakfast)      Dextromethorphan-guaiFENesin (ROBITUSSIN DM PO) Take by mouth daily      torsemide (DEMADEX) 10 MG tablet Take 10 mg by mouth daily         Allergies:  Codeine    Social History:    Social History     Socioeconomic History    Marital status:      Spouse name: Not on file    Number of children: Not on file    Years of education: Not on file    Highest education level: Not on file   Occupational History    Not on file   Tobacco Use    Smoking status: Not on file   Substance and Sexual Activity    Alcohol use: Not on file    Drug use: Not on file    Sexual activity: Not on file   Other Topics Concern    Not on file   Social History Narrative    Not on file     Social Determinants of Health     Financial Resource Strain:     Difficulty of Paying Living Expenses:    Food Insecurity:     Worried About Running Out of Food in the Last Year:     [address] in the Last Year:    Transportation Needs:     Lack of Transportation (Medical):      Lack of Transportation (Non-Medical):    Physical Activity:     Days of Exercise per Week:     Minutes of Exercise per Session:    Stress:     Feeling of Stress :    Social Connections:     Frequency of Communication with Friends and Family:     Frequency of Social Gatherings with Friends and Family:     Attends Yazidi Services:     Active Member of Clubs or Organizations:     Attends Club or Organization Meetings:     Marital Status:    Intimate Partner Violence:     Fear of Current or Ex-Partner:     Emotionally Abused:     Physically Abused:     Sexually Abused:        Family History:   No family history on file. Review of Systems:   Review of Systems   Constitutional: Negative for activity change. HENT: Negative for congestion. Eyes: Negative for discharge. Respiratory: Positive for shortness of breath. Cardiovascular: Positive for leg swelling. Gastrointestinal: Negative for abdominal distention. Endocrine: Negative for cold intolerance. Genitourinary: Negative for difficulty urinating. Musculoskeletal: Negative for arthralgias. Allergic/Immunologic: Negative for environmental allergies. Neurological: Negative for dizziness. Hematological: Negative for adenopathy. Psychiatric/Behavioral: Negative for agitation. Physical exam:   Constitutional:  VITALS:  BP (!) 95/29   Pulse 60   Temp 97.4 °F (36.3 °C)   Resp 22   Ht 5' 5\" (1.651 m)   Wt 243 lb (110.2 kg)   SpO2 96%   BMI 40.44 kg/m²   Gen: alert, awake, nad  Skin: no rash, turgor wnl  Heent:  eomi, mmm  Neck: no bruits or jvd noted, thyroid normal  Lungs:  B/l rhonchi  Heart:  Heart sounds are normal.  Regular rate and rhythm without murmur, gallop or rub.   Abdomen:  +bs, soft, nt, nd, no hepatosplenomegaly   Extremities: 2+ edema  Psychiatric: mood and affect appropriate; judgement and insight intact  Musculoskeletal:  Rom, muscular strength intact; digits, nails normal    Data/  CBC:   Lab Results   Component Value Date    WBC 7.0 08/27/2021    RBC 3.32 08/27/2021    HGB 9.9 08/27/2021    HCT 31.3 08/27/2021    MCV 94.3 08/27/2021    MCH 30.0 08/27/2021    MCHC 31.8 08/27/2021    RDW 15.7 08/27/2021     08/27/2021     BMP:    Lab Results   Component Value Date     08/27/2021    K 8.5 08/27/2021     08/27/2021    CO2 22 08/27/2021    BUN 71 08/27/2021    CREATININE 3.13 08/27/2021    CALCIUM 8.8 08/27/2021    GFRAA 17.3 08/27/2021    LABGLOM 14.3 08/27/2021    GLUCOSE 107 08/27/2021     No results found. Assessment/  [de-identified] yo lady lady with ckd stage 3. B/l cr mid 1's. Risk factors of CAD, CHF EF 35%. Borderline DM. Last cr was 1.3 in June. Now with JOSIAH. Appears to be ATN. Cause of ATN not entirely clear. No NSAID, dye, ace/arb. Was on abx but appears to be keflex which should be ok. Previous imaging and UA are ok. Has sig fluid overload. bp on low side.  k in 8's. Despite medical therapy. Not urinating much after mckeon    Plan/  1- d/w Dr. Lanre Benítez. Very much appreciate their help. HD cath and stat HD. Explained risks and benefits  2- treat k medically until then. Prefer lokelma  3- iv lasix 80 bid  4. Check ua/ u/s. Thank you for the consultation. Please do not hesitate to call with questions.     Lucretia Potts MD

## 2021-08-28 NOTE — PROGRESS NOTES
Department of Internal Medicine  General Internal Medicine  Attending Progress Note      SUBJECTIVE:  Pt seen and examined. Had dialysis and feels much improved, but states she still \"feels like crap\". No specific complaint. States her breathing is much improved. No needs    OBJECTIVE      Medications    Current Facility-Administered Medications: furosemide (LASIX) injection 80 mg, 80 mg, IntraVENous, BID  glucose (GLUTOSE) 40 % oral gel 15 g, 15 g, Oral, PRN  dextrose 50 % IV solution, 12.5 g, IntraVENous, PRN  glucagon (rDNA) injection 1 mg, 1 mg, IntraMUSCular, PRN  dextrose 5 % solution, 100 mL/hr, IntraVENous, PRN  heparin (porcine) injection 1,000 Units, 1,000 Units, IntraVENous, PRN  albumin human 25 % IV solution 25 g, 25 g, IntraVENous, Once PRN  albuterol (PROVENTIL) nebulizer solution 2.5 mg, 2.5 mg, Nebulization, Q4H PRN  sodium chloride flush 0.9 % injection 5-40 mL, 5-40 mL, IntraVENous, 2 times per day  sodium chloride flush 0.9 % injection 5-40 mL, 5-40 mL, IntraVENous, PRN  0.9 % sodium chloride infusion, 25 mL, IntraVENous, PRN  ondansetron (ZOFRAN-ODT) disintegrating tablet 4 mg, 4 mg, Oral, Q8H PRN **OR** ondansetron (ZOFRAN) injection 4 mg, 4 mg, IntraVENous, Q6H PRN  acetaminophen (TYLENOL) tablet 650 mg, 650 mg, Oral, Q6H PRN **OR** acetaminophen (TYLENOL) suppository 650 mg, 650 mg, Rectal, Q6H PRN  Physical    VITALS:  BP (!) 93/43   Pulse 60   Temp 98.1 °F (36.7 °C) (Oral)   Resp 20   Ht 5' 3\" (1.6 m)   Wt 267 lb 3.2 oz (121.2 kg)   SpO2 92%   BMI 47.33 kg/m²   Constitutional: Awake and alert in no acute distress.  Lying in bed comfortably  Head: Normocephalic, atraumatic  Eyes: EOMI, PERRLA  ENT: moist mucous membranes  Neck: neck supple, trachea midline, RIJ CVC in place  Lungs: Good inspiratory effort, no wheeze, no rhonchi, bibasilar crackles  Heart: RRR, normal S1 and S2  GI: Soft, non-distended, non tender, no guarding, no rebound, +BS  MSK: 2+ pitting edema noted bilateral LE's  Skin: warm, dry  Psych: appropriate affect     Data    CBC:   Lab Results   Component Value Date    WBC 5.0 08/28/2021    RBC 2.77 08/28/2021    HGB 8.3 08/28/2021    HCT 25.9 08/28/2021    MCV 93.6 08/28/2021    MCH 30.0 08/28/2021    MCHC 32.0 08/28/2021    RDW 15.5 08/28/2021     08/28/2021     CMP:    Lab Results   Component Value Date     08/28/2021    K 5.5 08/28/2021     08/28/2021    CO2 25 08/28/2021    BUN 48 08/28/2021    CREATININE 2.18 08/28/2021    GFRAA 26.2 08/28/2021    LABGLOM 21.7 08/28/2021    GLUCOSE 70 08/28/2021    PROT 6.0 08/28/2021    LABALBU 2.9 08/28/2021    CALCIUM 8.2 08/28/2021    BILITOT 0.7 08/28/2021    ALKPHOS 50 08/28/2021    AST 11 08/28/2021    ALT 6 08/28/2021       ASSESSMENT AND PLAN      # Acute renal failure with associated volume overload and hyperkalemia  - hx of CKD3b  - HD cath placed overnight and urgent dialysis initiated  - K initially 8.3 despite multiple interventions (insulin, kayexelate, lasix). Now 5.5 after dialysis  - nephrology consulted- IV lasix 80 BID, lokelma  - renal US with L renal cyst  - O2 as needed - wean as tolerated  - PT/OT  - echo ordered and pending  - avoid nephrotoxic meds  - replace electrolytes as needed    # Hx of systolic CHF  - echo pending. Previous EF 35% and severe PAH  - sp AICD placement  - monitor on tele  - IV lasix and dialysis for fluid removal    # PAF/CAD/HTN/HLD  - cont home meds  - holding nephrotoxic meds    DVT: on Eliquis    Disposition: Will need to monitor renal function and electrolytes closely to determine if further dialysis needed. Monitoring UOP. Nephrology consulted. PT/OT.        Larry Palumbo DO  Internal Medicine

## 2021-08-28 NOTE — FLOWSHEET NOTE
Patient is a&O x4, AV paced on tele, VSS on RA. Patient k+ 5.0 notified Dr. Hermes Harding and Dr. HUTTON [facility] no new orders. Per Dr. Hermes Harding he would like mckeon to remain in for accurate I/O, patient c/o tenderness around right neck HD catheter site, ecchymotic, soft to to touch, Dr. Hermes Harding looked at it no new orders. [id number] Dr. HUTTON [facility] [employer] order to transfer patient to ms floor with tele. Patient leaving unit with tele, glasses, purse, cellphone and sweater and shirt. Notified Patient's son of transfer to room [phone] Patient leaving unit by bed with transport.

## 2021-08-28 NOTE — ED NOTES
Pt has complete linen change with two RN assist due to urinary incontinence. Pt tolerated well.      Morenita Parker RN  08/27/21 9820

## 2021-08-28 NOTE — H&P
[place] [address] MEDICINE    HISTORY AND PHYSICAL EXAM    PATIENT NAME:  Melva Majano    MRN:  [id number]  SERVICE DATE:  8/27/2021   SERVICE TIME:  9:31 PM    Primary Care Physician: Claudette Willis         SUBJECTIVE  CHIEF COMPLAINT:  SOB    HPI:   Patient being admitted for hyperkalemia, JOSIAH on CKD, and CHF. Patient is a pleasant, alert and oriented x3,  female, [de-identified]. Patient reports that she has had severe shortness of breath over the past 2 days with bilateral lower extremity edema. Patient does report that she has chronic shortness of breath and edema but has significantly worsened in the past 2 days. Patient states that she was recently put on an antibiotic and read that it could be dangerous to her kidney so she stopped her Lasix about 4 days ago and continued the antibiotic. Patient continued all her other medications including her potassium and has no other change in her medications. Patient reports that her shortness of breath is worse with ambulation and lying down. She states she cannot lie down at all. Patient also states that she has a nonproductive cough. Patient denies any chest pain, abdominal pain, dysuria, fever, nausea, or vomiting. Patient's past medical history includes CAD with 1 stent in 2006. Patient has history of PAF and is on antiarrhythmics and Eliquis. Patient also has a pacemaker. Patient has past medical history of heart failure with echo in January 2021 showing EF of 35% pulmonary hypertension. Patient has a history of CKD3b. PAST MEDICAL HISTORY:  No past medical history on file. PAST SURGICAL HISTORY:  No past surgical history on file. FAMILY HISTORY:  No family history on file. SOCIAL HISTORY:    Social History     Socioeconomic History    Marital status:       Spouse name: Not on file    Number of children: Not on file    Years of education: Not on file    Highest education level: Not on file   Occupational History    Not on file   Tobacco Use    Smoking status: Not on file   Substance and Sexual Activity    Alcohol use: Not on file    Drug use: Not on file    Sexual activity: Not on file   Other Topics Concern    Not on file   Social History Narrative    Not on file     Social Determinants of Health     Financial Resource Strain:     Difficulty of Paying Living Expenses:    Food Insecurity:     Worried About Running Out of Food in the Last Year:     [address] in the Last Year:    Transportation Needs:     Lack of Transportation (Medical):  Lack of Transportation (Non-Medical):    Physical Activity:     Days of Exercise per Week:     Minutes of Exercise per Session:    Stress:     Feeling of Stress :    Social Connections:     Frequency of Communication with Friends and Family:     Frequency of Social Gatherings with Friends and Family:     Attends [employer] Services:     Active Member of Clubs or Organizations:     Attends Club or Organization Meetings:     Marital Status:    Intimate Partner Violence:     Fear of Current or Ex-Partner:     Emotionally Abused:     Physically Abused:     Sexually Abused:      MEDICATIONS:   Prior to Admission medications    Not on File       ALLERGIES: Codeine    REVIEW OF SYSTEM:   Review of Systems   Constitutional: Positive for fatigue. Negative for appetite change and fever. HENT: Negative for congestion, rhinorrhea and sore throat. Eyes: Negative. Negative for photophobia and visual disturbance. Respiratory: Positive for cough and shortness of breath. Negative for wheezing. Cardiovascular: Positive for leg swelling. Negative for chest pain. Gastrointestinal: Negative for abdominal pain, nausea and vomiting. Endocrine: Negative. Negative for polydipsia, polyphagia and polyuria. Genitourinary: Negative for difficulty urinating, dysuria and pelvic pain. Musculoskeletal: Negative for back pain. Skin: Negative. Negative for rash. Allergic/Immunologic: Negative. Neurological: Negative. Negative for dizziness, speech difficulty and weakness. Hematological: Negative. Psychiatric/Behavioral: Negative. Negative for behavioral problems and confusion. OBJECTIVE  PHYSICAL EXAM: /69   Pulse 74   Temp 97.4 °F (36.3 °C)   Resp 17   Ht 5' 5\" (1.651 m)   Wt 243 lb (110.2 kg)   SpO2 97%   BMI 40.44 kg/m²     Physical Exam  Vitals and nursing note reviewed. Constitutional:       General: She is not in acute distress. Appearance: She is well-developed. She is obese. She is ill-appearing. She is not toxic-appearing. HENT:      Head: Normocephalic. Right Ear: There is no impacted cerumen. Left Ear: There is no impacted cerumen. Nose: Nose normal.      Mouth/Throat:      Mouth: Mucous membranes are moist.   Eyes:      Pupils: Pupils are equal, round, and reactive to light. Cardiovascular:      Rate and Rhythm: Normal rate and regular rhythm. Pulses: Normal pulses. Heart sounds: Normal heart sounds. Pulmonary:      Effort: Pulmonary effort is normal. No respiratory distress. Breath sounds: Decreased air movement present. Rales present. No wheezing. Abdominal:      General: Bowel sounds are normal. There is no distension. Palpations: Abdomen is soft. There is no mass. Tenderness: There is no abdominal tenderness. There is no guarding or rebound. Hernia: No hernia is present. Musculoskeletal:         General: Normal range of motion. Cervical back: Normal range of motion. Right lower le+ Pitting Edema present. Left lower le+ Pitting Edema present. Skin:     General: Skin is warm and dry. Capillary Refill: Capillary refill takes less than 2 seconds. Neurological:      Mental Status: She is alert and oriented to person, place, and time.    Psychiatric:         Mood and Affect: Mood normal.           DATA:     Diagnostic tests reviewed for today's visit:    Most recent labs and imaging results reviewed.      LABS:    Recent Results (from the past 24 hour(s))   EKG 12 Lead    Collection Time: 08/27/21  6:48 PM   Result Value Ref Range    Ventricular Rate 106 BPM    Atrial Rate 64 BPM    P-R Interval 188 ms    QRS Duration 30 ms    Q-T Interval 378 ms    QTc Calculation (Bazett) 502 ms    P Axis -65 degrees    R Axis 0 degrees    T Axis 110 degrees   Basic Metabolic Panel    Collection Time: 08/27/21  7:00 PM   Result Value Ref Range    Sodium 134 (L) 135 - 144 mEq/L    Potassium 8.0 (HH) 3.4 - 4.9 mEq/L    Chloride 104 95 - 107 mEq/L    CO2 23 20 - 31 mEq/L    Anion Gap 7 (L) 9 - 15 mEq/L    Glucose 136 (H) 70 - 99 mg/dL    BUN 75 (HH) 8 - 23 mg/dL    CREATININE 3.13 (H) 0.50 - 0.90 mg/dL    GFR Non-African American 14.3 (L) >60    GFR  17.3 (L) >60    Calcium 8.2 (L) 8.5 - 9.9 mg/dL   Troponin    Collection Time: 08/27/21  7:00 PM   Result Value Ref Range    Troponin <0.010 0.000 - 0.010 ng/mL   CBC Auto Differential    Collection Time: 08/27/21  7:00 PM   Result Value Ref Range    WBC 7.0 4.8 - 10.8 K/uL    RBC 3.32 (L) 4.20 - 5.40 M/uL    Hemoglobin 9.9 (L) 12.0 - 16.0 g/dL    Hematocrit 31.3 (L) 37.0 - 47.0 %    MCV 94.3 82.0 - 100.0 fL    MCH 30.0 27.0 - 31.3 pg    MCHC 31.8 (L) 33.0 - 37.0 %    RDW 15.7 (H) 11.5 - 14.5 %    Platelets 003 762 - 985 K/uL    Neutrophils % 68.8 %    Lymphocytes % 18.2 %    Monocytes % 11.0 %    Eosinophils % 1.3 %    Basophils % 0.7 %    Neutrophils Absolute 4.8 1.4 - 6.5 K/uL    Lymphocytes Absolute 1.3 1.0 - 4.8 K/uL    Monocytes Absolute 0.8 0.2 - 0.8 K/uL    Eosinophils Absolute 0.1 0.0 - 0.7 K/uL    Basophils Absolute 0.0 0.0 - 0.2 K/uL   Brain Natriuretic Peptide    Collection Time: 08/27/21  7:00 PM   Result Value Ref Range    Pro-BNP 5,856 pg/mL   POCT Glucose    Collection Time: 08/27/21  8:29 PM   Result Value Ref Range    POC Glucose 110 60 - 115 mg/dl    Performed on ACCU-CHEK    Potassium Collection Time: 08/27/21  8:40 PM   Result Value Ref Range    Potassium 8.7 (HH) 3.4 - 4.9 mEq/L   POCT Glucose    Collection Time: 08/27/21  8:54 PM   Result Value Ref Range    POC Glucose 158 (H) 60 - 115 mg/dl    Performed on [id number]        IMAGING:  No results found. VTE Prophylaxis: Patient on Eliquis    ASSESSMENT AND PLAN  Principal problems  Hyperkalemia: Potassium 8.0. Likely related to stopping Lasix and continuing oral potassium supplement. Patient given potassium lowering cocktail of calcium chloride, Kayexalate, insulin 5 units, bicarb, and Lasix. We will consult nephrology. We will repeat BMP every 4 hours. JOSIAH on CKD: Creatinine 3.13. History of CKD stage 3b. We will consult nephrology for consideration of stat dialysis. We will avoid nephrotoxic agents when possible. Strict I&Os. We will get urine electrolytes and osmolality    Heart failure: Increased SOB, lower extremity edema, chest x-ray showed vascular congestion, proBNP 5856. History of heart failure with EF (01/21) of 35% and severe pulmonary hypertension. Patient on BB and diuretic. Patient has AICD/pacemaker. Patient remain on telemetry monitoring. Active Problems  PAF: Patient on antiarrhythmics and Eliquis. Patient has implantable AICD/pacemaker. We will resume home meds. CAD: Cardiac stent x1. Patient on statin. We will resume home meds. HTN: Patient on home meds to control. We will resume home meds. .  Will avoid nephrotoxic agents when possible. HLD: Patient on home meds to control. We will resume home meds. Plan of care discussed with: patient and adult child    SIGNATURE: Ernst Roberto APRN - CNP  DATE: August 27, 2021  TIME: 9:31 PM     RONN Dixon MD - supervising physician

## 2021-08-28 NOTE — PROGRESS NOTES
1100: Pt to 485 from ICU. Alert and oriented, calm cooperative. Pt shared sadness regarding current state, stating \"things got bad so quickly,\" \"I was just at the [address] a few days ago. \" Pt repositioned and resting comfortably. No complaints at this time. Au remains in place for strict I and O, as stated in ICU RN report per Dr. Janny Barajas. Up independently at home, but states weakness in BLE. Bed alarm on. Safety maintained. Call light within reach. 1800: Pt removed IV in LAC. Pressure held, but bleed quite a bit-- pressure dressing applied.     Electronically signed by Ekaterina De Jesus, RN on 8/28/2021 at 2:31 PM

## 2021-08-28 NOTE — PROGRESS NOTES
[facility] Respiratory Therapy Evaluation   Current Order: Albuterol Q6PRN    Home Regimen: None     Ordering Physician: Carlos  Re-evaluation Date:       Diagnosis: Hyperkalemia      Patient Status: Stable     The following MDI Criteria must be met in order to convert aerosol to MDI with spacer. If unable to meet, MDI will be converted to aerosol:  []  Patient able to demonstrate the ability to use MDI effectively  []  Patient alert and cooperative  []  Patient able to take deep breath with 5-10 second hold  []  Medication(s) available in this delivery method   []  Peak flow greater than or equal to 200 ml/min            Current Order Substituted To  (same drug, same frequency)   Aerosol to MDI [] Albuterol Sulfate 0.083% unit dose by aerosol Albuterol Sulfate MDI 2 puffs by inhalation with spacer    [] Levalbuterol 1.25 mg unit dose by aerosol Levalbuterol MDI 2 puffs by inhalation with spacer    [] Levalbuterol 0.63 mg unit dose by aerosol Levalbuterol MDI 2 puffs by inhalation with spacer    [] Ipratropium Bromide 0.02% unit dose by aerosol Ipratropium Bromide MDI 2 puffs by inhalation with spacer    [] Duoneb (Ipratropium + Albuterol) unit dose by aerosol Ipratropium MDI + Albuterol MDI 2 puffs by inhalation w/spacer   MDI to Aerosol [] Albuterol Sulfate MDI Albuterol Sulfate 0.083% unit dose by aerosol    [] Levalbuterol MDI 2 puffs by inhalation Levalbuterol 1.25 mg unit dose by aerosol    [] Ipratropium Bromide MDI by inhalation Ipratropium Bromide 0.02% unit dose by aerosol    [] Combivent (Ipratropium + Albuterol) MDI by inhalation Duoneb (Ipratropium + Albuterol) unit dose by aerosol       Treatment Assessment [Frequency/Schedule]:  Change frequency to: ____Albuterol Q4PRN______________________________________________per Protocol, P&T, MEC      Points 0 1 2 3 4   Pulmonary Status  Non-Smoker  [x]   Smoking history   < 20 pack years  []   Smoking history  ?  20 pack years  []   Pulmonary Disorder  (acute or chronic)  []   Severe or Chronic w/ Exacerbation  []     Surgical Status No [x]   Surgeries     General []   Surgery Lower []   Abdominal Thoracic or []   Upper Abdominal Thoracic with  PulmonaryDisorder  []     Chest X-ray Clear/Not  Ordered     []  Chronic Changes  Results Pending  []  Infiltrates, atelectasis, pleural effusion, or edema  [x]  Infiltrates in more than one lobe []  Infiltrate + Atelectasis, &/or pleural effusion  []    Respiratory Pattern Regular,  RR = 12-20 [x]  Increased,  RR = 21-25 []  COOPER, irregular,  or RR = 26-30 []  Decreased FEV1  or RR = 31-35 []  Severe SOB, use  of accessory muscles, or RR ? 35  []    Mental Status Alert, oriented,  Cooperative [x]  Confused but Follows commands []  Lethargic or unable to follow commands []  Obtunded  []  Comatose  []    Breath Sounds Clear to  auscultation  []  Decreased unilaterally or  in bases only []  Decreased  bilaterally  [x]  Crackles or intermittent wheezes []  Wheezes []    Cough Strong, Spontan., & nonproductive [x]  Strong,  spontaneous, &  productive []  Weak,  Nonproductive []  Weak, productive or  with wheezes []  No spontaneous  cough or may require suctioning []    Level of Activity Ambulatory []  Ambulatory w/ Assist  [x]  Non-ambulatory []  Paraplegic []  Quadriplegic []    Total    Score:____5___     Triage Score:____5____      Tri       Triage:     1. (>20) Freq: Q3    2. (16-20) Freq: Q4   3. (11-15) Freq: QID & Albuterol Q2 PRN    4. (6-10) Freq: TID & Albuterol Q2 PRN    5. (0-5) Freq Q4prn

## 2021-08-28 NOTE — FLOWSHEET NOTE
Patient admitted to ICU at [address] [facility] from ER. Report at bedside. Patient admission completed. Physician at bedside to place HD catheter. Consent signed. 0100 Dialysis called stated will be here by 0315. Dialysis complete at 0600.   Patient tolerated well

## 2021-08-28 NOTE — PROGRESS NOTES
NOTIFIED AT [address] [facility], [address] [facility] J. PT TOLERATING TX WELL. PT STABLE AT THIS TIME.

## 2021-08-29 NOTE — CONSULTS
Inpatient consult to Case Management  Consult performed by: Johnnie Lo MD  Consult ordered by: MILEY Reddy - NOA          Patient Name: Cody Cabrera Date: 2021  6:47 PM  MR #: [id number]  : 1940    Attending Physician: Cinthia Cohen DO  Reason for consult: CHF JOSIAH HyperK Hypotension    History of Presenting Illness:      Napoleon Romo is a [de-identified] y.o. female on hospital day 2 with a history of . History Obtained From:  patient, electronic medical record    Admitted with K 8.7.  underwent emergent HD. JOSIAH improving. makiing urine. She has h/o ICM EF 30% s/p CRT-D. She is volume overloaded but BP is very low 79/34 making it difficult to treat appropriately. Pt denies CP/ Breathing is better. History:      EKG: SR  Past Medical History:   Diagnosis Date    Atrial fibrillation ([address]     CAD (coronary artery disease)     Cancer (HCC)     CHF (congestive heart failure) (HCC)     Hyperlipidemia     Hypertension     Obesity      Past Surgical History:   Procedure Laterality Date    CARDIAC CATHETERIZATION      CAROTID ENDARTERECTOMY Left     CHOLECYSTECTOMY      HYSTERECTOMY, TOTAL ABDOMINAL      PACEMAKER INSERTION Left     TONSILLECTOMY         Family History  History reviewed. No pertinent family history. [] Unable to obtain due to ventilated and/ or neurologic status    Social History     Socioeconomic History    Marital status:       Spouse name: Not on file    Number of children: Not on file    Years of education: Not on file    Highest education level: Not on file   Occupational History    Not on file   Tobacco Use    Smoking status: Former Smoker     Quit date: 2016     Years since quittin.0    Smokeless tobacco: Never Used   Vaping Use    Vaping Use: Never used   Substance and Sexual Activity    Alcohol use: Yes     Comment: socially    Drug use: Never    Sexual activity: Not on file   Other Topics Concern    Not on file   Social History Narrative    Not on file     Social Determinants of Health     Financial Resource Strain:     Difficulty of Paying Living Expenses:    Food Insecurity:     Worried About Running Out of Food in the Last Year:     [address] in the Last Year:    Transportation Needs:     Lack of Transportation (Medical):      Lack of Transportation (Non-Medical):    Physical Activity:     Days of Exercise per Week:     Minutes of Exercise per Session:    Stress:     Feeling of Stress :    Social Connections:     Frequency of Communication with Friends and Family:     Frequency of Social Gatherings with Friends and Family:     Attends Synagogue Services:     Active Member of Clubs or Organizations:     Attends Club or Organization Meetings:     Marital Status:    Intimate Partner Violence:     Fear of Current or Ex-Partner:     Emotionally Abused:     Physically Abused:     Sexually Abused:       [] Unable to obtain due to ventilated and/ or neurologic status      Home Medications:      Medications Prior to Admission: acetaminophen (TYLENOL) 325 MG tablet, Take 650 mg by mouth every 6 hours as needed for Pain  allopurinol (ZYLOPRIM) 100 MG tablet, Take 300 mg by mouth daily  amiodarone (CORDARONE) 200 MG tablet, Take 200 mg by mouth daily  apixaban (ELIQUIS) 5 MG TABS tablet, Take 5 mg by mouth 2 times daily  atorvastatin (LIPITOR) 20 MG tablet, Take 20 mg by mouth daily  Biotin 5 MG CAPS, Take by mouth daily  carvedilol (COREG) 3.125 MG tablet, Take 3.125 mg by mouth 2 times daily (with meals)  Cholecalciferol (VITAMIN D3) 1.25 MG (66911 UT) CAPS, Take 1,000 Units by mouth 2 times daily  fluticasone (FLONASE) 50 MCG/ACT nasal spray, 1 spray by Each Nostril route daily  folic acid (FOLVITE) 1 MG tablet, Take 1 mg by mouth daily  magnesium 200 MG TABS tablet, Take 400 mg by mouth daily  metOLazone (ZAROXOLYN) 2.5 MG tablet, Take 2.5 mg by mouth Take 2 times a week prior to torsemide on mondays and thursdays  nitroGLYCERIN (NITROSTAT) 0.4 MG SL tablet, Place 0.4 mg under the tongue every 5 minutes as needed for Chest pain up to max of 3 total doses. If no relief after 1 dose, call 911. potassium chloride (KLOR-CON M) 20 MEQ TBCR extended release tablet, Take 40 mEq by mouth daily (with breakfast)  Dextromethorphan-guaiFENesin (ROBITUSSIN DM PO), Take by mouth daily  torsemide (DEMADEX) 10 MG tablet, Take 10 mg by mouth daily    Current Hospital Medications:     Scheduled Meds:   epoetin makayla-epbx  10,000 Units Subcutaneous Q7 Days    midodrine  5 mg Oral TID    furosemide  80 mg IntraVENous BID    sodium chloride flush  5-40 mL IntraVENous 2 times per day     Continuous Infusions:   dextrose      sodium chloride       PRN Meds:.glucose, dextrose, glucagon (rDNA), dextrose, heparin (porcine), albuterol, sodium chloride flush, sodium chloride, ondansetron **OR** ondansetron, acetaminophen **OR** acetaminophen  .  dextrose      sodium chloride          Allergies: Allergies   Allergen Reactions    Codeine      Other reaction(s): GI Upset  nausea & headaches          Review of Systems:       Review of Systems   Constitutional: Negative. Negative for diaphoresis and fatigue. HENT: Negative. Eyes: Negative. Respiratory: Positive for shortness of breath. Negative for cough, chest tightness, wheezing and stridor. Cardiovascular: Positive for leg swelling. Negative for chest pain and palpitations. Gastrointestinal: Negative. Negative for blood in stool and nausea. Genitourinary: Negative. Musculoskeletal: Negative. Skin: Negative. Neurological: Negative. Negative for dizziness, syncope, weakness and light-headedness. Hematological: Negative. Psychiatric/Behavioral: Negative.           Objective Findings:     Vitals:BP (!) 79/34   Pulse 60   Temp 98.3 °F (36.8 °C) (Oral)   Resp 18   Ht 5' 3\" (1.6 m)   Wt 267 lb 3.2 oz (121.2 kg)   SpO2 95%   BMI 47.33 kg/m² Physical Examination:    Physical Exam   Constitutional: No distress. She appears chronically ill and acutely ill. HENT:   Normal cephalic and Atraumatic   Eyes: Pupils are equal, round, and reactive to light. Neck: Thyroid normal. No JVD present. No neck adenopathy. No thyromegaly present. Cardiovascular: Normal rate, regular rhythm, normal heart sounds, intact distal pulses and normal pulses. Pulmonary/Chest: Effort normal and breath sounds normal. She has no wheezes. She has no rales. She exhibits no tenderness. Abdominal: Soft. Bowel sounds are normal. There is no abdominal tenderness. Musculoskeletal:         General: Edema (2+) present. No tenderness. Normal range of motion. Cervical back: Normal range of motion and neck supple. Neurological: She is alert and oriented to person, place, and time. Skin: Skin is warm. No cyanosis. Nails show no clubbing. Results/ Medications reviewed 8/29/2021, 1:12 PM     Laboratory, Microbiology, Pathology, Radiology, Cardiology, Medications and Transcriptions reviewed  Scheduled Meds:   epoetin makayla-epbx  10,000 Units Subcutaneous Q7 Days    midodrine  5 mg Oral TID    furosemide  80 mg IntraVENous BID    sodium chloride flush  5-40 mL IntraVENous 2 times per day     Continuous Infusions:   dextrose      sodium chloride         Recent Labs     08/27/21  1900 08/28/21  0600 08/29/21  0730   WBC 7.0 5.0 4.0*   HGB 9.9* 8.3* 8.4*   HCT 31.3* 25.9* 26.4*   MCV 94.3 93.6 95.7    155 143     Recent Labs     08/28/21  0600 08/28/21  0600 08/28/21  1114 08/28/21  2030 08/29/21  0730     --   --  138 138   K 5.0*   < > 5.5* 4.8 4.7     --   --  105 104   CO2 25  --   --  26 25   BUN 48*  --   --  52* 54*   CREATININE 2.18*  --   --  2.31* 2.62*    < > = values in this interval not displayed.      Recent Labs     08/28/21  0600 08/29/21  0730   AST 11 12   ALT 6 <5   BILITOT 0.7 0.7   ALKPHOS 50 47     No results for input(s): LIPASE, AMYLASE in the last 72 hours. Recent Labs     08/28/21  0600 08/29/21  0730   PROT 6.0* 5.3*     Echocardiogram complete 2D with doppler with color    Result Date: 8/29/2021  Transthoracic Echocardiography Report (TTE)  Demographics   Patient Name     Henrietta Cabral Gender                Female   Patient Number   [id number]    Race                                                  Ethnicity   Visit Number     [id number]   Room Number           Y178   Corporate ID                 Date of Study         08/28/2021   Accession Number [id number]  Referring Physician   Date of Birth    1940  Sonographer           Kennedi Prabhakar   Age              [de-identified] year(s)  Interpreting          [facility]) Cardiology                               Physician             Freda Osorio MD  Procedure Type of Study   TTE procedure:ECHO COMPLETE 2D W/DOP W/COLOR. Procedure Date Date: 08/28/2021 Start: 02:29 PM Study Location: Portable Technical Quality: Adequate visualization Indications:Congestive heart failure. Patient Status: Routine Height: 63 inches Weight: 287 pounds BSA: 2.25 m^2 BMI: 50.84 kg/m^2 BP: 103/22 mmHg  Conclusions   Summary  Pacing wires noted  MItral Leaflets are moderate Thick. 2+MR  Normal tricuspid valve structure and function. 3+ TR  RVSP 80 mmHg  Left ventricular ejection fraction is visually estimated at 30%. Pseudonormal filling pattern noted. Signature   ----------------------------------------------------------------  Electronically signed by Freda Osorio MD(Interpreting  physician) on 08/29/2021 09:04 AM  ----------------------------------------------------------------   Findings  Left Ventricle Left ventricular ejection fraction is visually estimated at 30%. Pseudonormal filling pattern noted. Right Ventricle Mildly enlarged right ventricle cavity. Left Atrium Moderately dilated left atrium. Right Atrium Moderately enlarged right atrium size. Mitral Valve MItral Leaflets are moderate Thick. 2+MR Tricuspid Valve Normal tricuspid valve structure and function. 3+ TR RVSP 80 mmHg Aortic Valve Aortic valve leaflets are moderately thickened. No AS Pulmonic Valve The pulmonic valve was not well visualized . Pericardial Effusion No evidence of pericardial effusion. Pleural Effusion No evidence of pleural effusion. Aorta \ Miscellaneous The aorta is within normal limits. M-Mode Measurements (cm)   LVIDd: 5.83 cm                         LVIDs: 4.95 cm  IVSd: 1.31 cm                          IVSs: 1.92 cm  LVPWd: 1.64 cm                         LVPWs: 1.84 cm  Rt. Vent.  Dimension: 5.57 cm           AO Root Dimension: 3.45 cm                                         ACS: 1.89 cm                                         LVOT: 2.31 cm  Doppler Measurements:   AV Velocity:0.02 m/s                    MV Peak E-Wave: 1.18 m/s  AV Peak Gradient: 15.37 mmHg            MV Peak A-Wave: 0.78 m/s  AV Mean Gradient: 9.51 mmHg  AV Area (Continuity):1.97 cm^2  TR Velocity:4.21 m/s                    Estimated RAP:10 mmHg  TR Gradient:70.99 mmHg                  RVSP:80.99 mmHg  Valves  Mitral Valve   Peak E-Wave: 1.18 m/s                 Peak A-Wave: 0.78 m/s                                        E/A Ratio: 1.52                                        Peak Gradient: 5.59 mmHg  MR Velocity: 5.3 m/s                  Deceleration Time: 290.4 msec   Tissue Doppler   E' Septal Velocity: 0.07 m/s  E' Lateral Velocity: 0.08 m/s   Aortic Valve   Peak Velocity: 1.96 m/s                Mean Velocity: 1.48 m/s  Peak Gradient: 15.37 mmHg              Mean Gradient: 9.51 mmHg  Area (continuity): 1.97 cm^2  AV VTI: 42.8 cm   Cusp Separation: 1.89 cm   Tricuspid Valve   Estimated RVSP: 80.99 mmHg              Estimated RAP: 10 mmHg  TR Velocity: 4.21 m/s                   TR Gradient: 70.99 mmHg   Pulmonic Valve   Peak Velocity: 2 m/s           Peak Gradient: 15.97 mmHg                                 Estimated PASP: 80.99 mmHg   LVOT   Peak Velocity: 0.83 m/s              Mean Velocity: 0.62 m/s  Peak Gradient: 2.74 mmHg             Mean Gradient: 1.64 mmHg  LVOT Diameter: 2.31 cm               LVOT VTI: 20.16 cm  Structures  Left Atrium   LA Volume/Index: 205.67 ml /91 m^2            LA Area: 40.87 cm^2   Left Ventricle   Diastolic Dimension: 1.02 cm         Systolic Dimension: 6.89 cm  Septum Diastolic: 3.41 cm            Septum Systolic: 0.87 cm  PW Diastolic: 4.02 cm                PW Systolic: 2.93 cm                                       FS: 15.1 %  LV EDV/LV EDV Index: 168.21 ml/75    LV ESV/LV ESV Index: 115.66 ml/51 m^2  m^2                                  LV Length: 9.99 cm  EF Calculated: 31.2 %   LVOT Diameter: 2.31 cm   Right Atrium   RA Systolic Pressure: 10 mmHg   Right Ventricle   Diastolic Dimension: 4.30 cm                                    RV Systolic Pressure: 55.28 mmHg  Aorta/ Miscellaneous Aorta   Aortic Root: 3.45 cm  LVOT Diameter: 2.31 cm      XR CHEST PORTABLE    Result Date: 8/28/2021  XR CHEST PORTABLE Clinical History:  Shortness of breath. Comparison:  6/20/2006  RESULT: Left transvenous ICD. Bibasilar pleural-parenchymal changes, most likely combination of opacity and pleural effusion. No pneumothorax. Enlarged cardiac mediastinal silhouette, partially obscured by the lung findings. Aortic vascular calcifications with apparent aneurysmal dilation of the thoracic aorta measuring around 4.5 cm, but not well assessed on this study. No distinct acute osseous findings. Bibasilar pleural-parenchymal changes, most likely combination of opacity and pleural effusion. Aortic vascular calcifications with apparent aneurysmal dilation of the thoracic aorta measuring around 4.5 cm, but not well assessed on this study. Size may also be accentuated by AP technique.      XR CHEST PORTABLE    Result Date: 8/28/2021  EXAMINATION: XR CHEST PORTABLE CLINICAL HISTORY: LINE PLACED COMPARISONS: CHEST RADIOGRAPH AUGUST 27, 2021 FINDINGS: Patient rotated to left. Pacemaker generator and wires unchanged. Right jugular vein central line identified with tip in right atrium. Area of increased opacity obscures left diaphragm, left cardiac silhouette, and left mid and lower lung, unchanged. Blunting right costophrenic angle with ill-defined area of increased opacity right mid and right lower lung, unchanged. BILATERAL PLEURAL EFFUSIONS WITH BILATERAL ATELECTASIS/PNEUMONIA, UNCHANGED. US RETROPERITONEAL LIMITED    Result Date: 8/28/2021  EXAMINATION: US RETROPERITONEAL LIMITED CLINICAL HISTORY: ACUTE KIDNEY INJURY FINDINGS: Right kidney measures 11.7 x 4.9 x 5.5 cm. Left kidney measures 10.3 x 5.3 x 5.4 cm. Color flow without anomaly bilaterally. No calculi and no pelvocaliectasis bilaterally. No cortical thinning bilaterally. 5.6 x 6.7 x 4.7 cm cyst lower pole left kidney. LEFT RENAL CYST. Active Hospital Problems    Diagnosis Date Noted    Hyperkalemia [E87.5] 08/27/2021     Priority: Low    JOSIAH on CKD [N17.9, N18.9] 08/27/2021     Priority: Low    Heart failure [I50.9] 08/27/2021     Priority: Low    PAF  [I48.0] 08/27/2021     Priority: Low    CAD [I25.10] 08/27/2021     Priority: Low    HTN [I10] 08/27/2021     Priority: Low    HLD [E78.5] 08/27/2021     Priority: Low         Impression/Plan:   1. Hyperkalemia - resolved with HD.  2. A/C SHF- continue iv Lasix. Follow labs closely. Strict I/Os  3. Hypotension- add Midodrine and titrate   4. CAD prior LAD stent - continue asa. BB on  Hold due to low BP  5. PAF - remains in AF now. Will start SQ Heparin. Resume Eliquis prior to dc. 6. HPL- resume Statin  7. Moderate MR - will need afterload when BP stalbe  8. Severe PA HTN RVSP 80 mmHg. Thank you for allowing us to participate in the care of this patient. Will continue to follow. Please call if questions or concerns arise.     Electronically signed by Magno Finn MD on 8/29/2021 at 1:12 PM

## 2021-08-29 NOTE — CARE COORDINATION
MET WITH PATIENT. FREEDOM OF CHOICE GIVEN. AGREEABLE TO PHYSICIAN RECOMMENDATIONS SUCH AS HHC, SNF, REHAB. PT STATES SHE HAS BEEN INDEPENDENT UNTIL THIS HAPPENED. WILL NEED FURTHER DISCUSSION AFTER PT/GLADYS PATEL. [facility] [place] Case Management Initial Discharge Assessment    Met with Patient to discuss discharge plan. PCP: Yodit Torres                                Date of Last Visit: \"NO IDEA\"    If no PCP, list provided? N/A    Discharge Planning    Living Arrangements: independently at home    Who do you live with? GRANDSON    Who helps you with your care:  self    If lives at home:     Do you have any barriers navigating in your home? No, ONE LEVEL HOME    Patient can perform ADL? Yes, BUT IS NOW HAVING DIFFICULTY. Current Services (outpatient and in home) :  None    Dialysis: No, MONITOR FOR DIALYSIS NEEDS AT D/C. PT IS NEW ACUTE DIALYSIS PT. Is transportation available to get to your appointments? Yes, FAMILY TRANSPORTS     DME Equipment:  yes - [facility]    Respiratory equipment: None, MONITOR FOR HOME OXYGEN NEEDS. PT NOW REQUIRES O2. Respiratory provider:  no     Pharmacy:  yes - [address] [place] [address] with Medication Assistance Program?  No, ALTHOUGH PT MAY BENEFIT FROM HOME ASSISTANCE WITH MEDS GIVEN HISTORY OF STOPPING MEDS ON HER OWN. Patient agreeable to [facility] [address]? Yes, Company WILL NEED LIST    Patient agreeable to SNF/Rehab? Yes, Company WILL NEED LIST    Other discharge needs identified? Palliative Care    Does Patient Have a High-Risk for Readmission Diagnosis (CHF, PN, MI, COPD)? Yes, CAD, CHF, ICD      Initial Discharge Plan? (Note: please see concurrent daily documentation for any updates after initial note). AWAITING PT/OT JORGE TO DETERMINE FURTHER PLANNING. PT MAY BENEFIT FROM REHAB/SNF IF DIALYSIS NEEDED AS OP. PT MAY BENEFIT FROM HOME ASSISTANCE WITH REINFORCED MEDICATION EDUCATION.      Readmission Risk              Risk of Unplanned Readmission:  19 Electronically signed by Allegra Newman RN on 8/29/2021 at 9:52 AM

## 2021-08-29 NOTE — PROGRESS NOTES
[facility] OCCUPATIONAL THERAPY EVALUATION - ACUTE     NAME: Velia Parsons  : 1940 ([de-identified] y.o.)  MRN: [id number]  CODE STATUS: Full Code  [facility]    Date of Service: 2021    Patient Diagnosis(es): Hyperkalemia [E87.5]  Acute renal failure, unspecified acute renal failure type ([facility] [N17.9]  Acute congestive heart failure, unspecified heart failure type [facility]) [I50.9]   Chief Complaint   Patient presents with    Shortness of Breath     Patient Active Problem List    Diagnosis Date Noted    Hyperkalemia 2021    JOSIAH on CKD 2021    Heart failure 2021    PAF  2021    CAD 2021    HTN 2021    HLD 2021        Past Medical History:   Diagnosis Date    Atrial fibrillation ([facility]     CAD (coronary artery disease)     [facility]     CH[facility]congestive [facility] failure) [facility]     Hyperlipidemia     Hypertension     Obesity      Past Surgical History:   Procedure Laterality Date    CARDIAC CATHETERIZATION      CAROTID ENDARTERECTOMY Left     CHOLECYSTECTOMY      HYSTERECTOMY, TOTAL ABDOMINAL      PACEMAKER INSERTION Left     TONSILLECTOMY          Restrictions:FAll, O2        Safety Devices: Safety Devices  Safety Devices in place: Yes  Type of devices: All fall risk precautions in place   Initially in place: No    Subjective: Pt  Pleasant and cooperative with session. \"I ain't going back to no nursing home. \"       Pain Reassessment: 0/10 pain reported          Prior Level of Function:  Social/Functional History  Lives With: Family (grandson; pt is home alone at times)  Type of Home: Apartment (laundry in pt's bathroom)  Home Layout: One level  Bathroom Shower/Tub: Walk-in shower  Bathroom Equipment: Shower chair, Grab bars in shower, Grab bars around toilet  Home Equipment: Rolling walker, Lift chair  ADL Assistance: Independent  Homemaking Assistance: Needs assistance (grandson cooks; grandson shares laundry task)  Homemaking Responsibilities: Yes  Ambulation Assistance: Independent ([employer] used at baseline)  Transfer Assistance: Independent  Active : Yes  Additional Comments: sleeping in a recliner    OBJECTIVE:     Orientation Status:  Orientation  Overall Orientation Status: Within Functional Limits    Observation:  Observation/Palpation  Posture: Fair    Cognition Status:  Cognition  Cognition Comment: follows one step commands    Perception Status:  Perception  Overall Perceptual Status: WFL    Sensation Status:  Sensation  Overall Sensation Status: WFL    Vision and Hearing Status:  Vision  Vision: Impaired  Vision Exceptions: Wears glasses for reading  Hearing  Hearing: Exceptions to [facility]  Hearing Exceptions: Hard of hearing/hearing concerns     ROM:   LUE AROM (degrees)  LUE General AROM: limited shoulder flexion  and abduction  Left Hand AROM (degrees)  Left Hand AROM: WFL  RUE AROM (degrees)  RUE General AROM: limited shoulder flexion and abduction  Right Hand AROM (degrees)  Right Hand AROM: WFL    Strength:  LUE Strength  L Hand General: 4/5  LUE Strength Comment: limited shoulder  RUE Strength  R Hand General: 4/5  RUE Strength Comment: limited shoulder    Coordination, Tone, Quality of Movement: Tone RUE  RUE Tone: Normotonic  Tone LUE  LUE Tone: Normotonic  Coordination  Movements Are Fluid And Coordinated: No  Coordination and Movement description: Right UE, Left UE    Hand Dominance:  Hand Dominance  Hand Dominance: Right    ADL Status:  ADL  Feeding: Unable to assess(comment)  Grooming: Contact guard assistance  UE Bathing: Minimal assistance  LE Bathing:  Moderate assistance  UE Dressing: Minimal assistance  LE Dressing: Maximum assistance  Toileting: Unable to assess(comment)          Therapy key for assistance levels -   Independent = Pt. is able to perform task with no assistance but may require a device   Stand by assistance = Pt. does not perform task at an independent level but does not need physical assistance, requires verbal cues  Minimal, Moderate, Maximal Assistance = Pt. requires physical assistance (25%, 50%, 75% assist from helper) for task but is able to actively participate in task   Dependent = Pt. requires total assistance with task and is not able to actively participate with task completion     Functional Mobility:     Transfers  Sit to stand:  Moderate assistance, 2 Person assistance  Stand to sit: Moderate assistance, 2 Person assistance    Bed Mobility  Bed mobility  Supine to Sit: Moderate assistance, 2 Person assistance  Sit to Supine: Maximum assistance, 2 Person assistance    Seated and Standing Balance:  Balance  Sitting Balance: Supervision  Standing Balance: Minimal assistance    Functional Endurance:  Activity Tolerance  Activity Tolerance: Patient limited by fatigue, Treatment limited secondary to medical complications (free text)    D/C Recommendations:  OT D/C RECOMMENDATIONS  REQUIRES OT FOLLOW UP: Yes    Equipment Recommendations:       OT Education:        OT Follow Up:  OT D/C RECOMMENDATIONS  REQUIRES OT FOLLOW UP: Yes       Assessment/Discharge Disposition:     Performance deficits / Impairments: Decreased functional mobility , Decreased strength, Decreased endurance, Decreased ADL status, Decreased posture, Decreased balance, Decreased ROM  Prognosis: Good  Discharge Recommendations: Continue to assess pending progress  Decision Making: Medium Complexity  History: 5 complexities  Exam: 7 deficits  Assistance / Modification: Max A    Six Click Score    How much help for putting on and taking off regular lower body clothing?: A Lot  How much help for Bathing?: A Lot  How much help for Toileting?: A Lot  How much help for putting on and taking off regular upper body clothing?: A Lot  How much help for taking care of personal grooming?: A Little  How much help for eating meals?: None  AM-[facility] Inpatient Daily Activity Raw Score: 15  AM-[facility] Inpatient ADL T-Scale Score : 34.69  ADL Inpatient CMS 0-100% Score: 56.46    Plan:  Plan  Times per week: 1-4x/wk  Current Treatment Recommendations: Strengthening, Functional Mobility Training, Endurance Training, Balance Training, Neuromuscular Re-education, Self-Care / ADL, ROM    Goals:   Patient will:    - Improve functional endurance to tolerate/complete 10-18 mins of ADL's  - Be SBA in UB ADLs   - Be SBA in LB ADLs  - Be SBA in ADL transfers without LOB  - Be SBA in toileting tasks  - Improve bilateral UE strength and endurance to Fair+ in order to participate in self-care activities as projected. Patient Goal: Patient goals :  To return to home      Discussed and agreed upon: Yes Comments:     Therapy Time:   OT Individual Minutes  Time In: 1308  Time Out: 1332  Minutes: 24    Eval: 24 minutes     Electronically signed by:    DOMINGO Baker, PRECIOUS/L  8/37/0840, 2:08 PM Electronically signed by DOMINGO Baker on 5/18/26 at 2:02 PM EDT

## 2021-08-29 NOTE — PROGRESS NOTES
Physical Therapy Med Surg Initial Assessment  Facility/Department: [facility]  [facility]       NAME: Kaushal Woodruff  : 1940 ([de-identified] y.o.)  MRN: [id number]  CODE STATUS: Full Code    Date of Service: 2021    Patient Diagnosis(es): Hyperkalemia [E87.5]  Acute renal failure, unspecified acute renal failure type ([facility] [N17.9]  Acute congestive heart failure, unspecified heart failure type [facility]) [I50.9]   Chief Complaint   Patient presents with    Shortness of Breath     Patient Active Problem List    Diagnosis Date Noted    Hyperkalemia 2021    JOSIAH on CKD 2021    Heart failure 2021    PAF  2021    CAD 2021    HTN 2021    HLD 2021        Past Medical History:   Diagnosis Date    Atrial fibrillation ([facility]     CAD (coronary artery disease)     Cancer ([facility]     CHF (congestive heart failure) (HCC)     Hyperlipidemia     Hypertension     Obesity      Past Surgical History:   Procedure Laterality Date    CARDIAC CATHETERIZATION      CAROTID ENDARTERECTOMY Left     CHOLECYSTECTOMY      HYSTERECTOMY, TOTAL ABDOMINAL      PACEMAKER INSERTION Left     TONSILLECTOMY         Chart Reviewed: Yes  Patient assessed for rehabilitation services?: Yes  Family / Caregiver Present: No    Restrictions:  Restrictions/Precautions: Fall Risk (mod balderas score)     SUBJECTIVE:      Pain  Pre Treatment Pain Screening  Intervention List: Patient able to continue with treatment    Post Treatment Pain Screening:   Pain Screening  Patient Currently in Pain: Denies    Prior Level of Function:  Social/Functional History  Lives With: Family (grandson; pt is home alone at times)  Type of Home: Apartment (laundry in pt's bathroom)  Home Layout: One level  Bathroom Shower/Tub: Walk-in shower  Bathroom Equipment: Shower chair, Grab bars in shower, Grab bars around toilet  Home Equipment: Rolling walker, Lift chair  ADL Assistance: [address] [place] [address] Avenue: mobility ; Decreased balance;Decreased ROM; Decreased endurance;Decreased strength;Decreased posture  Decision Making: Medium Complexity  History: med  Exam: med  Clinical Presentation: med    Prognosis: Good    DISCHARGE RECOMMENDATIONS:  Discharge Recommendations: Continue to assess pending progress    Assessment: Pt exhibits SOB with minimal exertion, decreased standing balance, decreased LE strength requiring increased assistance of two people for all functional mobility at this time. Continued PT is indicated for safe return home with limited assistance and to allow pt to return to [facility]. REQUIRES PT FOLLOW UP: Yes      PLAN OF CARE:  Plan  Times per week: 3-6  Current Treatment Recommendations: Strengthening, Transfer Training, Endurance Training, Neuromuscular Re-education, Patient/Caregiver Education & Training, ROM, Wheelchair Mobility Training, Manual Therapy - Soft Tissue Mobilization, Pain Management, Equipment Evaluation, Education, & procurement, Balance Training, Gait Training, Home Exercise Program, Functional Mobility Training, Safety Education & Training, Positioning  Safety Devices  Type of devices: Bed alarm in place, Call light within reach, Left in bed    Goals:  Short term goals  Short term goal 1: Pt will demonstrate HEP indep  Long term goals  Long term goal 1: Pt will demonstrate transfers mod indep with safest AD  Long term goal 2: Pt will demonstrate amb 100ft mod indep with safest AD  Long term goal 3: Pt will demonstrate standing tolerance 5 min without UE support    [facility] (6 CLICK) Amparo Chaudhari 28 Inpatient Mobility Raw Score : 9    Therapy Time:   Individual   Time In 1308   Time Out 1332   Minutes 55 [place], [place], 08/29/21 at 2:03 PM         Definitions for assistance levels  Independent = pt does not require any physical supervision or assistance from another person for activity completion. Device may be needed.   Stand by assistance = pt requires verbal cues or instructions from another person, close to but not touching, to perform the activity  Minimal assistance= pt performs 75% or more of the activity; assistance is required to complete the activity  Moderate assistance= pt performs 50% of the activity; assistance is required to complete the activity  Maximal assistance = pt performs 25% of the activity; assistance is required to complete the activity  Dependent = pt requires total physical assistance to accomplish the task

## 2021-08-29 NOTE — CARE COORDINATION
MET WITH PATIENT. FREEDOM OF CHOICE GIVEN. PT AGREEABLE TO [facility] OR [facility]. SHE HAS SNF LIST. SHE WOULD LIKE TO MEET WITH LSW ON Monday TO DISCUSS OPTIONS. SHE WANTS TO THINK ABOUT IT A LITTLE LONGER. PT IS CURRENTLY ON OXYGEN.

## 2021-08-29 NOTE — PROGRESS NOTES
Department of Internal Medicine  General Internal Medicine  Attending Progress Note      SUBJECTIVE:  Pt seen and examined. More sob today. Held Lasix due to hypotension. Continues to have swelling and edema. Midodrine x1 ordered for BP    OBJECTIVE      Medications    Current Facility-Administered Medications: epoetin makayla-epbx (RETACRIT) injection 10,000 Units, 10,000 Units, Subcutaneous, Q7 Days  midodrine (PROAMATINE) tablet 5 mg, 5 mg, Oral, TID  heparin (porcine) injection 5,000 Units, 5,000 Units, Subcutaneous, 3 times per day  amiodarone (CORDARONE) tablet 200 mg, 200 mg, Oral, Daily  furosemide (LASIX) injection 80 mg, 80 mg, IntraVENous, BID  glucose (GLUTOSE) 40 % oral gel 15 g, 15 g, Oral, PRN  dextrose 50 % IV solution, 12.5 g, IntraVENous, PRN  glucagon (rDNA) injection 1 mg, 1 mg, IntraMUSCular, PRN  dextrose 5 % solution, 100 mL/hr, IntraVENous, PRN  heparin (porcine) injection 1,000 Units, 1,000 Units, IntraVENous, PRN  albuterol (PROVENTIL) nebulizer solution 2.5 mg, 2.5 mg, Nebulization, Q4H PRN  sodium chloride flush 0.9 % injection 5-40 mL, 5-40 mL, IntraVENous, 2 times per day  sodium chloride flush 0.9 % injection 5-40 mL, 5-40 mL, IntraVENous, PRN  0.9 % sodium chloride infusion, 25 mL, IntraVENous, PRN  ondansetron (ZOFRAN-ODT) disintegrating tablet 4 mg, 4 mg, Oral, Q8H PRN **OR** ondansetron (ZOFRAN) injection 4 mg, 4 mg, IntraVENous, Q6H PRN  acetaminophen (TYLENOL) tablet 650 mg, 650 mg, Oral, Q6H PRN **OR** acetaminophen (TYLENOL) suppository 650 mg, 650 mg, Rectal, Q6H PRN  Physical    VITALS:  BP (!) 99/46   Pulse 59   Temp 98.3 °F (36.8 °C) (Oral)   Resp 18   Ht 5' 3\" (1.6 m)   Wt 267 lb 3.2 oz (121.2 kg)   SpO2 92%   BMI 47.33 kg/m²   Constitutional: Awake and alert in no acute distress.  Lying in bed comfortably  Head: Normocephalic, atraumatic  Eyes: EOMI, PERRLA  ENT: moist mucous membranes  Neck: neck supple, trachea midline, RIJ CVC in place  Lungs: Good inspiratory effort, no wheeze, no rhonchi, bibasilar crackles  Heart: RRR, normal S1 and S2  GI: Soft, non-distended, non tender, no guarding, no rebound, +BS  MSK: 2+ pitting edema noted bilateral LE's  Skin: warm, dry  Psych: appropriate affect     Data    CBC:   Lab Results   Component Value Date    WBC 4.0 08/29/2021    RBC 2.76 08/29/2021    HGB 8.4 08/29/2021    HCT 26.4 08/29/2021    MCV 95.7 08/29/2021    MCH 30.4 08/29/2021    MCHC 31.7 08/29/2021    RDW 15.7 08/29/2021     08/29/2021     CMP:    Lab Results   Component Value Date     08/29/2021    K 4.7 08/29/2021     08/29/2021    CO2 25 08/29/2021    BUN 54 08/29/2021    CREATININE 2.62 08/29/2021    GFRAA 21.2 08/29/2021    LABGLOM 17.5 08/29/2021    GLUCOSE 81 08/29/2021    PROT 5.3 08/29/2021    LABALBU 2.7 08/29/2021    CALCIUM 8.5 08/29/2021    BILITOT 0.7 08/29/2021    ALKPHOS 47 08/29/2021    AST 12 08/29/2021    ALT <5 08/29/2021       ASSESSMENT AND PLAN      # Acute renal failure with associated volume overload and hyperkalemia  - hx of CKD3b  - HD cath placed on admission and urgent dialysis initiated  - K initially 8.3 despite multiple interventions (insulin, kayexelate, lasix). Now 4.7 after dialysis  - nephrology consulted- IV lasix 80 BID, lokelma  - renal US with L renal cyst  - O2 as needed - wean as tolerated  - PT/OT  - echo with EF 30%  - avoid nephrotoxic meds  - replace electrolytes as needed    # Hx of systolic CHF  - echo pending. Previous EF 35% and severe PAH  - sp AICD placement  - monitor on tele  - IV lasix and dialysis for fluid removal  - cardiology consulted    # PAF/CAD/HTN/HLD  - cont home meds, holding BP meds in setting of hypotension  - holding nephrotoxic meds    DVT: on Eliquis    Disposition: Will need to monitor renal function, volume status, and electrolytes closely to determine if further dialysis needed. Monitoring UOP. Nephrology and cardiology consulted. PT/OT. Will need SNF on discharge.       Lukasz Nick [facility] [employer], [place]  Internal Medicine

## 2021-08-29 NOTE — PROGRESS NOTES
2.31*   GLUCOSE 107*  --  70  --  73   CALCIUM 8.8  --  8.2*  --  8.1*   LABGLOM 14.3*  --  21.7*  --  20.3*    < > = values in this interval not displayed. Troponin:   Recent Labs     08/28/21  1114   TROPONINI <0.010     BNP: No results for input(s): BNP in the last 72 hours. INR: No results for input(s): INR in the last 72 hours. Lipids: No results for input(s): CHOL, LDLDIRECT, TRIG, HDL, AMYLASE, LIPASE in the last 72 hours. Liver:   Recent Labs     08/28/21  0600   AST 11   ALT 6   ALKPHOS 50   PROT 6.0*   LABALBU 2.9*   BILITOT 0.7     Iron:  No results for input(s): IRONS, FERRITIN in the last 72 hours. Invalid input(s): LABIRONS  Urinalysis: No results for input(s): UA in the last 72 hours. Objective:   Vitals: BP (!) 99/37   Pulse 59   Temp 98.3 °F (36.8 °C) (Oral)   Resp 18   Ht 5' 3\" (1.6 m)   Wt 267 lb 3.2 oz (121.2 kg)   SpO2 92%   BMI 47.33 kg/m²    Wt Readings from Last 3 Encounters:   08/28/21 267 lb 3.2 oz (121.2 kg)      24HR INTAKE/OUTPUT:      Intake/Output Summary (Last 24 hours) at 8/29/2021 0802  Last data filed at 8/29/2021 5752  Gross per 24 hour   Intake 1116 ml   Output 1550 ml   Net -434 ml       Constitutional:  Alert, awake, no apparent distress   Skin:normal, no rash  HEENT:sclera anicteric.   Head atraumatic normocephalic  Neck:supple with no thyromegally  Cardiovascular:  S1, S2 without m/r/g   Respiratory:  CTA B without w/r/r   Abdomen: +bs, soft, nt  Ext: 2+ LE edema  Musculoskeletal:Intact  Neuro:Alert and oriented with no deficit      Electronically signed by Lucretia Potts MD on 8/29/2021 at 8:02 AM

## 2021-08-30 NOTE — PROGRESS NOTES
Department of Internal Medicine  General Internal Medicine  Attending Progress Note      SUBJECTIVE:  Pt seen and examined. Continues to have swelling. No dyspnea but requiring 5L O2.     OBJECTIVE      Medications    Current Facility-Administered Medications: aspirin EC tablet 81 mg, 81 mg, Oral, Daily  atorvastatin (LIPITOR) tablet 40 mg, 40 mg, Oral, Nightly  metOLazone (ZAROXOLYN) tablet 2.5 mg, 2.5 mg, Oral, Daily  epoetin makayla-epbx (RETACRIT) injection 10,000 Units, 10,000 Units, SubCUTAneous, Q7 Days  midodrine (PROAMATINE) tablet 5 mg, 5 mg, Oral, TID  heparin (porcine) injection 5,000 Units, 5,000 Units, SubCUTAneous, 3 times per day  amiodarone (CORDARONE) tablet 200 mg, 200 mg, Oral, Daily  furosemide (LASIX) injection 80 mg, 80 mg, IntraVENous, BID  glucose (GLUTOSE) 40 % oral gel 15 g, 15 g, Oral, PRN  dextrose 50 % IV solution, 12.5 g, IntraVENous, PRN  glucagon (rDNA) injection 1 mg, 1 mg, IntraMUSCular, PRN  dextrose 5 % solution, 100 mL/hr, IntraVENous, PRN  heparin (porcine) injection 1,000 Units, 1,000 Units, IntraVENous, PRN  albuterol (PROVENTIL) nebulizer solution 2.5 mg, 2.5 mg, Nebulization, Q4H PRN  sodium chloride flush 0.9 % injection 5-40 mL, 5-40 mL, IntraVENous, 2 times per day  sodium chloride flush 0.9 % injection 5-40 mL, 5-40 mL, IntraVENous, PRN  0.9 % sodium chloride infusion, 25 mL, IntraVENous, PRN  ondansetron (ZOFRAN-ODT) disintegrating tablet 4 mg, 4 mg, Oral, Q8H PRN **OR** ondansetron (ZOFRAN) injection 4 mg, 4 mg, IntraVENous, Q6H PRN  acetaminophen (TYLENOL) tablet 650 mg, 650 mg, Oral, Q6H PRN **OR** acetaminophen (TYLENOL) suppository 650 mg, 650 mg, Rectal, Q6H PRN  Physical    VITALS:  BP (!) 82/35   Pulse 60   Temp 98.1 °F (36.7 °C) (Oral)   Resp 20   Ht 5' 3\" (1.6 m)   Wt 267 lb 3.2 oz (121.2 kg)   SpO2 93%   BMI 47.33 kg/m²   Constitutional: Awake and alert in no acute distress.  Lying in bed comfortably  Head: Normocephalic, atraumatic  Eyes: EOMI, PERRLA  ENT: moist mucous membranes  Neck: neck supple, trachea midline, RIJ CVC in place  Lungs: Good inspiratory effort, no wheeze, no rhonchi, bibasilar crackles  Heart: RRR, normal S1 and S2  GI: Soft, non-distended, non tender, no guarding, no rebound, +BS  MSK: 2+ pitting edema noted bilateral LE's  Skin: warm, dry  Psych: appropriate affect     Data    CBC:   Lab Results   Component Value Date    WBC 4.1 08/30/2021    RBC 2.78 08/30/2021    HGB 8.2 08/30/2021    HCT 26.3 08/30/2021    MCV 94.7 08/30/2021    MCH 29.5 08/30/2021    MCHC 31.1 08/30/2021    RDW 15.7 08/30/2021     08/30/2021     CMP:    Lab Results   Component Value Date     08/30/2021    K 4.6 08/30/2021     08/30/2021    CO2 25 08/30/2021    BUN 57 08/30/2021    CREATININE 2.41 08/30/2021    GFRAA 23.4 08/30/2021    LABGLOM 19.3 08/30/2021    GLUCOSE 85 08/30/2021    PROT 5.2 08/30/2021    LABALBU 2.6 08/30/2021    CALCIUM 8.3 08/30/2021    BILITOT 0.7 08/30/2021    ALKPHOS 45 08/30/2021    AST 12 08/30/2021    ALT <5 08/30/2021       ASSESSMENT AND PLAN      # Acute renal failure with associated volume overload and hyperkalemia  - hx of CKD3b  - HD cath placed on admission and urgent dialysis initiated- renal doesn't think HD is needed today  - K initially 8.3 despite multiple interventions (insulin, kayexelate, lasix). Now 4.7 after dialysis  - nephrology consulted- IV lasix 80 BID, lokelma  - renal US with L renal cyst  - O2 as needed - wean as tolerated  - PT/OT  - echo with EF 30%  - avoid nephrotoxic meds  - replace electrolytes as needed    # Hx of systolic CHF  - echo pending.  Previous EF 35% and severe PAH  - sp AICD placement  - monitor on tele  - IV lasix and dialysis for fluid removal  - cardiology consulted    # PAF/CAD/HTN/HLD  - cont home meds, holding BP meds in setting of hypotension  - holding nephrotoxic meds    # GOC  - palliative consulted     DVT: on Eliquis    Disposition: Will need to monitor renal function, volume status, and electrolytes closely to determine if further dialysis needed. Monitoring UOP. Nephrology and cardiology consulted. PT/OT. Will need SNF on discharge.       Hilario Rai,    Internal Medicine

## 2021-08-30 NOTE — PROGRESS NOTES
Progress Note  Patient: Marty Warner  Unit/Bed: R983/J219-64  YOB: 1940  MRN: [id number]  Acct: [de-identified]   Admitting Diagnosis: Hyperkalemia [E87.5]  Acute renal failure, unspecified acute renal failure type [facility]) [N17.9]  Acute congestive heart failure, unspecified heart failure type [facility]) [I50.9]  Admit Date:  2021  Hospital Day: 3    Chief Complaint: JOSIAH HyperK CHF    Histories:  Past Medical History:   Diagnosis Date    [facility]     CAD (coronary artery disease)     Cancer ([facility]     [facility] failure) ([facility]     Hyperlipidemia     Hypertension     Obesity      Past Surgical History:   Procedure Laterality Date    CARDIAC CATHETERIZATION      CAROTID ENDARTERECTOMY Left     CHOLECYSTECTOMY      HYSTERECTOMY, TOTAL ABDOMINAL      PACEMAKER INSERTION Left     TONSILLECTOMY       History reviewed. No pertinent family history. Social History     Socioeconomic History    Marital status:      Spouse name: None    Number of children: None    Years of education: None    Highest education level: None   Occupational History    None   Tobacco Use    Smoking status: Former Smoker     Quit date: 2016     Years since quittin.0    Smokeless tobacco: Never Used   Vaping Use    Vaping Use: Never used   Substance and Sexual Activity    Alcohol use: Yes     Comment: socially    Drug use: Never    Sexual activity: None   Other Topics Concern    None   Social History Narrative    None     Social Determinants of Health     Financial Resource Strain:     Difficulty of Paying Living Expenses:    Food Insecurity:     Worried About Running Out of Food in the Last Year:     [address] in the Last Year:    Transportation Needs:     Lack of Transportation (Medical):      Lack of Transportation (Non-Medical):    Physical Activity:     Days of Exercise per Week:     Minutes of Exercise per Session:    Stress:     Feeling of Stress : Social Connections:     Frequency of Communication with Friends and Family:     Frequency of Social Gatherings with Friends and Family:     Attends [employer] Services:     Active Member of Clubs or Organizations:     Attends Club or Organization Meetings:     Marital Status:    Intimate Partner Violence:     Fear of Current or Ex-Partner:     Emotionally Abused:     Physically Abused:     Sexually Abused:        Subjective/HPI slept better. eating ok. No cp no sob    EKG: AV paced 60        Review of Systems:   Review of Systems   Constitutional: Negative. Negative for diaphoresis and fatigue. HENT: Negative. Eyes: Negative. Respiratory: Negative. Negative for cough, chest tightness, shortness of breath, wheezing and stridor. Cardiovascular: Positive for leg swelling. Negative for chest pain and palpitations. Gastrointestinal: Negative. Negative for blood in stool and nausea. Genitourinary: Negative. Musculoskeletal: Negative. Skin: Negative. Neurological: Positive for weakness. Negative for dizziness, syncope and light-headedness. Hematological: Negative. Psychiatric/Behavioral: Negative. Physical Examination:    BP (!) 99/44   Pulse 61   Temp 98.3 °F (36.8 °C) (Oral)   Resp 18   Ht 5' 3\" (1.6 m)   Wt 267 lb 3.2 oz (121.2 kg)   SpO2 92%   BMI 47.33 kg/m²    Physical Exam   Constitutional: She appears healthy. No distress. HENT:   Normal cephalic and Atraumatic   Eyes: Pupils are equal, round, and reactive to light. Neck: Thyroid normal. No JVD present. No neck adenopathy. No thyromegaly present. Cardiovascular: Normal rate, regular rhythm, normal heart sounds, intact distal pulses and normal pulses. Pulmonary/Chest: Effort normal and breath sounds normal. She has no wheezes. She has no rales. She exhibits no tenderness. Abdominal: Soft. Bowel sounds are normal. There is no abdominal tenderness.    Musculoskeletal:         General: No tenderness or edema (2+). Normal range of motion. Cervical back: Normal range of motion and neck supple. Neurological: She is alert and oriented to person, place, and time. Skin: Skin is warm. No cyanosis. Nails show no clubbing.        LABS:  CBC:   Lab Results   Component Value Date    WBC 4.1 08/30/2021    RBC 2.78 08/30/2021    HGB 8.2 08/30/2021    HCT 26.3 08/30/2021    MCV 94.7 08/30/2021    MCH 29.5 08/30/2021    MCHC 31.1 08/30/2021    RDW 15.7 08/30/2021     08/30/2021     CBC with Differential:    Lab Results   Component Value Date    WBC 4.1 08/30/2021    RBC 2.78 08/30/2021    HGB 8.2 08/30/2021    HCT 26.3 08/30/2021     08/30/2021    MCV 94.7 08/30/2021    MCH 29.5 08/30/2021    MCHC 31.1 08/30/2021    RDW 15.7 08/30/2021    LYMPHOPCT 24.9 08/30/2021    MONOPCT 13.7 08/30/2021    BASOPCT 0.7 08/30/2021    MONOSABS 0.6 08/30/2021    LYMPHSABS 1.0 08/30/2021    EOSABS 0.1 08/30/2021    BASOSABS 0.0 08/30/2021     CMP:    Lab Results   Component Value Date     08/30/2021    K 4.6 08/30/2021     08/30/2021    CO2 25 08/30/2021    BUN 57 08/30/2021    CREATININE 2.41 08/30/2021    GFRAA 23.4 08/30/2021    LABGLOM 19.3 08/30/2021    GLUCOSE 85 08/30/2021    PROT 5.2 08/30/2021    LABALBU 2.6 08/30/2021    CALCIUM 8.3 08/30/2021    BILITOT 0.7 08/30/2021    ALKPHOS 45 08/30/2021    AST 12 08/30/2021    ALT <5 08/30/2021     BMP:    Lab Results   Component Value Date     08/30/2021    K 4.6 08/30/2021     08/30/2021    CO2 25 08/30/2021    BUN 57 08/30/2021    LABALBU 2.6 08/30/2021    CREATININE 2.41 08/30/2021    CALCIUM 8.3 08/30/2021    GFRAA 23.4 08/30/2021    LABGLOM 19.3 08/30/2021    GLUCOSE 85 08/30/2021     Magnesium:    Lab Results   Component Value Date    MG 2.3 08/30/2021     Troponin:    Lab Results   Component Value Date    TROPONINI <0.010 08/28/2021        Active Hospital Problems    Diagnosis Date Noted    Hyperkalemia [E87.5] 08/27/2021     Priority: Low

## 2021-08-30 NOTE — PROGRESS NOTES
Renal Progress Note    Assessment and Plan:    [de-identified] yo lady lady with ckd stage 3. B/l cr mid 1's. Risk factors of CAD, CHF EF 35%. Borderline DM. Last cr was 1.3 in June. Now with JOSIAH. Appears to be ATN. Cause of ATN not entirely clear. Her urine indices are indicative of prerenal azotemia though. Fractional excretion of urea is 11% and also has a low fractional excretion of sodium as well. No NSAID, dye, ace/arb. Was on abx but appears to be keflex which should be ok. Previous imaging and UA are ok. Has sig fluid overload. bp on low side.  k in 8's. Despite medical therapy. Was emergently dialyzed on admission. Making some urine now.   Renal ultrasound is okay.     Plan/  1- continue IV Lasix 80 twice a day  2-blood pressure is running on the low side, agree w/ addition of midodrine   3-Hopeful that we will not need more dialysis, does not appear needed at this time   4-outpatient follow-up with Dr. Cecille King at [facility]  5-dispo pending, pt deciding between [facility] and [facility]  6- retacrit for anemia       Patient Active Problem List:     Hyperkalemia     JOSIAH on CKD     Heart failure     PAF      CAD     HTN     HLD      Subjective:   Admit Date: 8/27/2021    Interval History: renal function remains stable, K remains wnl      Medications:   Scheduled Meds:   aspirin  81 mg Oral Daily    atorvastatin  40 mg Oral Nightly    metOLazone  2.5 mg Oral Daily    epoetin makayla-epbx  10,000 Units SubCUTAneous Q7 Days    midodrine  5 mg Oral TID    heparin (porcine)  5,000 Units SubCUTAneous 3 times per day    amiodarone  200 mg Oral Daily    furosemide  80 mg IntraVENous BID    sodium chloride flush  5-40 mL IntraVENous 2 times per day     Continuous Infusions:   dextrose      sodium chloride         CBC:   Recent Labs     08/29/21  0730 08/30/21  0737   WBC 4.0* 4.1*   HGB 8.4* 8.2*    147     CMP:    Recent Labs     08/29/21  0730 08/29/21  1938 08/30/21  0737    135 137   K 4.7 4.5 4.6    101 104   CO2 25 26 25   BUN 54* 54* 57*   CREATININE 2.62* 2.52* 2.41*   GLUCOSE 81 134* 85   CALCIUM 8.5 8.3* 8.3*   LABGLOM 17.5* 18.3* 19.3*     Troponin:   Recent Labs     08/28/21  1114   TROPONINI <0.010     BNP: No results for input(s): BNP in the last 72 hours. INR: No results for input(s): INR in the last 72 hours. Lipids: No results for input(s): CHOL, LDLDIRECT, TRIG, HDL, AMYLASE, LIPASE in the last 72 hours. Liver:   Recent Labs     08/30/21  0737   AST 12   ALT <5   ALKPHOS 45   PROT 5.2*   LABALBU 2.6*   BILITOT 0.7     Iron:    Recent Labs     08/30/21  0737   FERRITIN 25.5     Urinalysis: No results for input(s): UA in the last 72 hours.     Objective:   Vitals: BP (!) 99/40   Pulse 60   Temp 98.1 °F (36.7 °C) (Oral)   Resp 20   Ht 5' 3\" (1.6 m)   Wt 267 lb 3.2 oz (121.2 kg)   SpO2 94%   BMI 47.33 kg/m²    Wt Readings from Last 3 Encounters:   08/28/21 267 lb 3.2 oz (121.2 kg)      24HR INTAKE/OUTPUT:      Intake/Output Summary (Last 24 hours) at 8/30/2021 1250  Last data filed at 8/30/2021 1235  Gross per 24 hour   Intake 2005 ml   Output 550 ml   Net 1455 ml       General: alert, in no apparent distress, obese   HEENT: normocephalic, atraumatic, anicteric  Lungs: non-labored respirations, clear to auscultation bilaterally  Heart: regular rate and rhythm, no murmurs or rubs  Abdomen: soft, non-tender, non-distended  Ext: no cyanosis, ++ peripheral edema  Neuro: alert and oriented, no gross abnormalities          Electronically signed by Reji Alvarez MD on 8/30/2021 at 12:50 PM

## 2021-08-31 NOTE — PROGRESS NOTES
Department of Internal Medicine  General Internal Medicine  Attending Progress Note      SUBJECTIVE:  Pt seen and examined. Continues to have swelling. OBJECTIVE      Medications    Current Facility-Administered Medications: iron sucrose (VENOFER) 200 mg in sodium chloride 0.9 % 100 mL IVPB, 200 mg, IntraVENous, Q24H  midodrine (PROAMATINE) tablet 10 mg, 10 mg, Oral, TID  aspirin EC tablet 81 mg, 81 mg, Oral, Daily  atorvastatin (LIPITOR) tablet 40 mg, 40 mg, Oral, Nightly  metOLazone (ZAROXOLYN) tablet 2.5 mg, 2.5 mg, Oral, Daily  epoetin makayla-epbx (RETACRIT) injection 10,000 Units, 10,000 Units, SubCUTAneous, Q7 Days  heparin (porcine) injection 5,000 Units, 5,000 Units, SubCUTAneous, 3 times per day  amiodarone (CORDARONE) tablet 200 mg, 200 mg, Oral, Daily  furosemide (LASIX) injection 80 mg, 80 mg, IntraVENous, BID  glucose (GLUTOSE) 40 % oral gel 15 g, 15 g, Oral, PRN  dextrose 50 % IV solution, 12.5 g, IntraVENous, PRN  glucagon (rDNA) injection 1 mg, 1 mg, IntraMUSCular, PRN  dextrose 5 % solution, 100 mL/hr, IntraVENous, PRN  heparin (porcine) injection 1,000 Units, 1,000 Units, IntraVENous, PRN  albuterol (PROVENTIL) nebulizer solution 2.5 mg, 2.5 mg, Nebulization, Q4H PRN  sodium chloride flush 0.9 % injection 5-40 mL, 5-40 mL, IntraVENous, 2 times per day  sodium chloride flush 0.9 % injection 5-40 mL, 5-40 mL, IntraVENous, PRN  0.9 % sodium chloride infusion, 25 mL, IntraVENous, PRN  ondansetron (ZOFRAN-ODT) disintegrating tablet 4 mg, 4 mg, Oral, Q8H PRN **OR** ondansetron (ZOFRAN) injection 4 mg, 4 mg, IntraVENous, Q6H PRN  acetaminophen (TYLENOL) tablet 650 mg, 650 mg, Oral, Q6H PRN **OR** acetaminophen (TYLENOL) suppository 650 mg, 650 mg, Rectal, Q6H PRN  Physical    VITALS:  BP (!) 93/41   Pulse 62   Temp 98.8 °F (37.1 °C) (Oral)   Resp 20   Ht 5' 3\" (1.6 m)   Wt 267 lb 3.2 oz (121.2 kg)   SpO2 92%   BMI 47.33 kg/m²   Constitutional: Awake and alert in no acute distress.  Lying in bed comfortably  Head: Normocephalic, atraumatic  Eyes: EOMI, PERRLA  ENT: moist mucous membranes  Neck: neck supple, trachea midline, RIJ CVC in place  Lungs: Good inspiratory effort, no wheeze, no rhonchi, bibasilar crackles  Heart: RRR, normal S1 and S2  GI: Soft, non-distended, non tender, no guarding, no rebound, +BS  MSK: 2+ pitting edema noted bilateral LE's  Skin: warm, dry  Psych: appropriate affect     Data    CBC:   Lab Results   Component Value Date    WBC 4.6 08/31/2021    RBC 2.73 08/31/2021    HGB 8.4 08/31/2021    HCT 25.9 08/31/2021    MCV 95.2 08/31/2021    MCH 30.9 08/31/2021    MCHC 32.5 08/31/2021    RDW 15.4 08/31/2021     08/31/2021     CMP:    Lab Results   Component Value Date     08/31/2021    K 4.4 08/31/2021     08/31/2021    CO2 29 08/31/2021    BUN 57 08/31/2021    CREATININE 2.61 08/31/2021    GFRAA 21.3 08/31/2021    LABGLOM 17.6 08/31/2021    GLUCOSE 95 08/31/2021    PROT 5.6 08/31/2021    LABALBU 2.6 08/31/2021    CALCIUM 7.8 08/31/2021    BILITOT 0.6 08/31/2021    ALKPHOS 47 08/31/2021    AST 12 08/31/2021    ALT <5 08/31/2021       ASSESSMENT AND PLAN      # Acute renal failure with associated volume overload and hyperkalemia  - hx of CKD3b  - HD cath placed on admission and urgent dialysis initiated- renal doesn't think HD is needed today  - K initially 8.3 despite multiple interventions (insulin, kayexelate, lasix). Now 4.7 after dialysis  - nephrology consulted- IV lasix, lokelma  - renal US with L renal cyst  - O2 as needed - wean as tolerated  - PT/OT  - echo with EF 30%  - avoid nephrotoxic meds  - replace electrolytes as needed    # Hx of systolic CHF  - echo pending. Previous EF 35% and severe PAH  - sp AICD placement  - monitor on tele  - IV lasix and dialysis for fluid removal  - cardiology on consult-seen by Dr. Glenwood Hashimoto.   Continue on IV diuresis    # PAF/CAD/HTN/HLD  - cont home meds, holding BP meds in setting of hypotension  - holding nephrotoxic meds    # GOC  - palliative consulted-CODE STATUS-DNR CCA  -Appreciate palliative care help    DVT: on Eliquis    Disposition: Will need to monitor renal function, volume status, and electrolytes closely to determine if further dialysis needed. Monitoring UOP. Nephrology and cardiology consulted. PT/OT. Will need SNF on discharge.       Gibran Pollack,    Internal Medicine

## 2021-08-31 NOTE — CONSULTS
Palliative Care Consult Note  Patient: Dione Dancer  Gender: female  YOB: 1940  Unit/Bed: W447/S901-08  Code Status: Full Code  Inpatient Treatment Team: Treatment Team: Attending Provider: Jermaine Romero DO; Consulting Physician: Twan Devine MD; Consulting Physician: Agustin Gresham MD; Utilization Reviewer: Deonna Jo, RN; Registered Nurse: Miroslava Romo, RN; Registered Nurse: Natali Turner, RN; : Sola Cespedes, GHADA; : Loc Ricardo, MSW, LSW; LPN: Supa Luevano LPN  Admit Date:  9/67/6706    Chief Complaint: Goals of Care    History of Presenting Illness:      Dione Dancer is a [de-identified] y.o. female on hospital day 4 with a history of  CAD, PAF on Eliquis status post pacemaker, CKD, CHF, presented with SOB and increased edema. She had stopped taking furosemide on her own as she had started an antibiotic ( keflex) and was worried the combination of antibiotics and diuretics would harm her renal function, continued potassium supplementation. She was found to be hyperkalemic (8.5), fluid overload, and JOSIAH. Required hemodialysis due to elevated K despite multiple interventions. IV lasix. EF 30 % Edema improving. It does not appear she will need dialysis again at this time. Negative pain, SOB, GI or  complaints, appetite stable. No significant emotional, spiritual, or sleep disturbance. Review of Systems:       Review of Systems   Constitutional: Positive for fatigue. Negative for activity change, appetite change, chills, diaphoresis, fever and unexpected weight change. HENT: Negative for drooling, hearing loss, mouth sores, sore throat, trouble swallowing and voice change. Eyes: Negative for discharge and visual disturbance. Respiratory: Negative for apnea, cough, choking, chest tightness, shortness of breath, wheezing and stridor. Cardiovascular: Negative for chest pain, palpitations and leg swelling.    Gastrointestinal: Negative for abdominal distention, abdominal pain, anal bleeding, blood in stool, constipation, diarrhea, nausea, rectal pain and vomiting. Genitourinary: Negative for difficulty urinating, dysuria, enuresis, frequency and hematuria. Musculoskeletal: Negative for arthralgias, back pain, gait problem, joint swelling and myalgias. Skin: Negative for color change, pallor, rash and wound. Allergic/Immunologic: Negative for food allergies and immunocompromised state. Neurological: Negative for dizziness, tremors, seizures, syncope, facial asymmetry, speech difficulty, weakness, light-headedness, numbness and headaches. Hematological: Negative for adenopathy. Does not bruise/bleed easily. Psychiatric/Behavioral: Negative for agitation, behavioral problems, confusion, decreased concentration, dysphoric mood, hallucinations, self-injury, sleep disturbance and suicidal ideas. The patient is not nervous/anxious and is not hyperactive. Physical Examination:       BP (!) 93/41   Pulse 62   Temp 98.8 °F (37.1 °C) (Oral)   Resp 20   Ht 5' 3\" (1.6 m)   Wt 267 lb 3.2 oz (121.2 kg)   SpO2 92%   BMI 47.33 kg/m²    Physical Exam  Constitutional:       General: She is not in acute distress. Appearance: She is well-developed. She is not diaphoretic. HENT:      Head: Normocephalic and atraumatic. Right Ear: External ear normal.      Left Ear: External ear normal.      Nose: Nose normal.      Mouth/Throat:      Pharynx: No oropharyngeal exudate. Eyes:      General: No scleral icterus. Right eye: No discharge. Left eye: No discharge. Conjunctiva/sclera: Conjunctivae normal.      Pupils: Pupils are equal, round, and reactive to light. Neck:      Thyroid: No thyromegaly. Vascular: No JVD. Trachea: No tracheal deviation. Cardiovascular:      Rate and Rhythm: Normal rate and regular rhythm. Heart sounds: Normal heart sounds.    Pulmonary:      Effort: Pulmonary effort is normal. No respiratory distress. Breath sounds: Normal breath sounds. No stridor. No wheezing or rales. Chest:      Chest wall: No tenderness. Abdominal:      General: Bowel sounds are normal. There is no distension. Palpations: Abdomen is soft. There is no mass. Tenderness: There is no abdominal tenderness. There is no guarding or rebound. Musculoskeletal:         General: No tenderness or deformity. Normal range of motion. Cervical back: Normal range of motion and neck supple. Lymphadenopathy:      Cervical: No cervical adenopathy. Skin:     General: Skin is warm and dry. Capillary Refill: Capillary refill takes less than 2 seconds. Findings: No erythema or rash. Neurological:      Mental Status: She is alert and oriented to person, place, and time. Psychiatric:         Behavior: Behavior normal.         Thought Content: Thought content normal.         Allergies:       Allergies   Allergen Reactions    Codeine      Other reaction(s): GI Upset  nausea & headaches         Medications:      Current Facility-Administered Medications   Medication Dose Route Frequency Provider Last Rate Last Admin    aspirin EC tablet 81 mg  81 mg Oral Daily Ryan Wang MD   81 mg at 08/30/21 7881    atorvastatin (LIPITOR) tablet 40 mg  40 mg Oral Nightly Ryan Wang MD   40 mg at 08/30/21 2031    metOLazone (ZAROXOLYN) tablet 2.5 mg  2.5 mg Oral Daily Ryan Wang MD   2.5 mg at 08/30/21 1053    epoetin makayla-epbx (RETACRIT) injection 10,000 Units  10,000 Units SubCUTAneous Q7 Days Tye Saeed MD   10,000 Units at 08/29/21 2311    midodrine (PROAMATINE) tablet 5 mg  5 mg Oral TID Frosty Ran, DO   5 mg at 08/30/21 2031    heparin (porcine) injection 5,000 Units  5,000 Units SubCUTAneous 3 times per day Ryan Wang MD   5,000 Units at 08/31/21 0030    amiodarone (CORDARONE) tablet 200 mg  200 mg Oral Daily Ryan Wang MD   200 mg at 08/30/21 0925    furosemide (LASIX) injection 80 mg  80 mg IntraVENous BID Bello Armas MD   80 mg at 08/30/21 1746    glucose (GLUTOSE) 40 % oral gel 15 g  15 g Oral PRN Bello Armas MD        dextrose 50 % IV solution  12.5 g IntraVENous PRN Bello Armas MD        glucagon (rDNA) injection 1 mg  1 mg IntraMUSCular PRN Bello Armas MD        dextrose 5 % solution  100 mL/hr IntraVENous PRN Bello Armas MD        heparin (porcine) injection 1,000 Units  1,000 Units IntraVENous PRN Bello Armas MD        albuterol (PROVENTIL) nebulizer solution 2.5 mg  2.5 mg Nebulization Q4H PRN Grant Rodriguez DO        sodium chloride flush 0.9 % injection 5-40 mL  5-40 mL IntraVENous 2 times per day Nicoletto Bolzan-Roche, APRN - CNP   10 mL at 08/30/21 2032    sodium chloride flush 0.9 % injection 5-40 mL  5-40 mL IntraVENous PRN Nicoletto Bolzan-Roche, APRN - CNP        0.9 % sodium chloride infusion  25 mL IntraVENous PRN Nicoletto Bolzan-Roche, APRN - CNP        ondansetron (ZOFRAN-ODT) disintegrating tablet 4 mg  4 mg Oral Q8H PRN Nicoletto Bolzan-Roche, APRN - CNP        Or    ondansetron (ZOFRAN) injection 4 mg  4 mg IntraVENous Q6H PRN Nicoletto Bolzan-Roche, APRN - CNP        acetaminophen (TYLENOL) tablet 650 mg  650 mg Oral Q6H PRN Nicoletto Bolzan-Roche, APRN - CNP        Or    acetaminophen (TYLENOL) suppository 650 mg  650 mg Rectal Q6H PRN Nicoletto Bolzan-Roche, APRN - CNP           History:      PM Hx:  Past Medical History:   Diagnosis Date    [facility]     CAD (coronary artery disease)     Cancer ([facility]     CHF (congestive heart failure) ([facility]     Hyperlipidemia     Hypertension     Obesity        PS Hx:  Past Surgical History:   Procedure Laterality Date    CARDIAC CATHETERIZATION      CAROTID ENDARTERECTOMY Left     CHOLECYSTECTOMY      HYSTERECTOMY, TOTAL ABDOMINAL      PACEMAKER INSERTION Left     TONSILLECTOMY         Social Hx:  Social History     Socioeconomic History    Marital status:      Spouse name: None    Number of children: None    Years of education: None    Highest education level: None   Occupational History    None   Tobacco Use    Smoking status: Former Smoker     Quit date: 2016     Years since quittin.0    Smokeless tobacco: Never Used   Vaping Use    Vaping Use: Never used   Substance and Sexual Activity    Alcohol use: Yes     Comment: socially    Drug use: Never    Sexual activity: None   Other Topics Concern    None   Social History Narrative    None     Social Determinants of Health     Financial Resource Strain:     Difficulty of Paying Living Expenses:    Food Insecurity:     Worried About Running Out of Food in the Last Year:     [address] in the Last Year:    Transportation Needs:     Lack of Transportation (Medical):  Lack of Transportation (Non-Medical):    Physical Activity:     Days of Exercise per Week:     Minutes of Exercise per Session:    Stress:     Feeling of Stress :    Social Connections:     Frequency of Communication with Friends and Family:     Frequency of Social Gatherings with Friends and Family:     Attends Islam Services:     Active Member of Clubs or Organizations:     Attends Club or Organization Meetings:     Marital Status:    Intimate Partner Violence:     Fear of Current or Ex-Partner:     Emotionally Abused:     Physically Abused:     Sexually Abused:        Family Hx:  History reviewed. No pertinent family history.     LABS: Reviewed     CBC:  Lab Results   Component Value Date    WBC 4.6 2021    RBC 2.73 2021    HGB 8.4 2021    HCT 25.9 2021    MCV 95.2 2021    MCH 30.9 2021    MCHC 32.5 2021    RDW 15.4 2021     2021     CBC with Differential:   Lab Results   Component Value Date    WBC 4.6 2021    RBC 2.73 2021    HGB 8.4 2021    HCT 25.9 2021     2021    MCV 95.2 2021    MCH 30.9 08/31/2021    MCHC 32.5 08/31/2021    RDW 15.4 08/31/2021    LYMPHOPCT 29.1 08/31/2021    MONOPCT 15.1 08/31/2021    BASOPCT 0.8 08/31/2021    MONOSABS 0.7 08/31/2021    LYMPHSABS 1.3 08/31/2021    EOSABS 0.1 08/31/2021    BASOSABS 0.0 08/31/2021     CMP:    Lab Results   Component Value Date     08/31/2021    K 4.4 08/31/2021     08/31/2021    CO2 29 08/31/2021    BUN 57 08/31/2021    CREATININE 2.61 08/31/2021    GFRAA 21.3 08/31/2021    LABGLOM 17.6 08/31/2021    GLUCOSE 95 08/31/2021    PROT 5.6 08/31/2021    LABALBU 2.6 08/31/2021    CALCIUM 7.8 08/31/2021    BILITOT 0.6 08/31/2021    ALKPHOS 47 08/31/2021    AST 12 08/31/2021    ALT <5 08/31/2021     BMP:    Lab Results   Component Value Date     08/31/2021    K 4.4 08/31/2021     08/31/2021    CO2 29 08/31/2021    BUN 57 08/31/2021    LABALBU 2.6 08/31/2021    CREATININE 2.61 08/31/2021    CALCIUM 7.8 08/31/2021    GFRAA 21.3 08/31/2021    LABGLOM 17.6 08/31/2021    GLUCOSE 95 08/31/2021     TSH: No results found for: TSH  Vitamin B 12 and Folate: No components found for: FOLIC,  D97  Urinalysis: No results found for: NITRU, [address], BACTERIA, RBCUA, BLOODU, SPECGRAV, GLUCOSEU        FUNCTIONAL ADL´S:     Independent: [ x ] Eating, [ x  ] Dressing, [  x ] Transferring, [ x  ] Toileting, [  x ] Bathing, [ x  ] Continence  Dependent   : [  ] Eating, [   ] Dressing, [   ] Transferring, [   ] Frankie Saunas, [   ] Bathing, [   ] Continence  W. assistant : [  ] Eating, [   ] Dressing, [   ] Transferring, [   ] Frankie Saunas, [   ] Bathing, [   ] Continence    Radiology: Reviewed      No results found. Assessment and plan:      -Advance Care Planning  Discussed goals of care with patient. Explained in extensive detail nuances between full code, DNR CCA and DNR CC. Patient has made the decision to be DNR CCA  Living will and MPOA in place      -Goals of Care Discussion:  Disease process and goals of treatment were discussed in basic terms.  Her goal is to optimize available comfort care measures to decrease hospitalizations and maximize function. Plan is SNF with Pall care to follow       We discussed the palliative care philosophy in light of those goals. We discussed all care options contingent on treatment response and QOL. Much active listening, presence, and emotional support were given. Thank you for allowing me to participate in her care    While hospitalized also treated for;  1. Hyperkalemia  2. JOSIAH on CKD  3. Heart Failure  4. PAF  5. CAD. 6. HTN  7.  HLD    Electronically signed by MILEY Devi CNP on 8/31/2021 at 9:05 AM

## 2021-08-31 NOTE — PROGRESS NOTES
Dual chamber ICD analysis per order Dr. Carito Huynh. Pt currently AP-BVP. Device alert for check atrial lead noted. Atrial sensing 0.2-0.4, good impedance, atrial capture threshold OK, amplitude adjusted for adequate safety margin. RV amplitude also adjusted for adequate safety margin. Good sensing and lead impedances in RA and RV, Good capture threshold in LV with 99% Bi-V pace. Atrial counters show 11 atrial arrhythmia episodes since 04/16/2021, all episodes less than 1 minute. The [facility] support Miles Carrillo to verify appropriate sensing in atrial lead. Reviewed all results with Dr. Carito Huynh. Battery status OK.

## 2021-08-31 NOTE — PROGRESS NOTES
Occupational Therapy  Facility/Department: [facility] SUR[facility]  Daily Treatment Note  NAME: Amira George  : 1940  MRN: [id number]    Date of Service: 2021    Discharge Recommendations:  Continue to assess pending progress       Assessment      Activity Tolerance  Activity Tolerance: Patient limited by fatigue  Safety Devices  Safety Devices in place: Yes  Type of devices: All fall risk precautions in place  Restraints  Initially in place: No         Patient Diagnosis(es): The primary encounter diagnosis was Acute congestive heart failure, unspecified heart failure type ([facility] [address] Diagnoses of Hyperkalemia and Acute renal failure, unspecified acute renal failure type [facility]) were also pertinent to this visit. has a past medical history of Atrial fibrillation ([facility] [address] CAD (coronary artery disease), [facility]F [facility]congestive [facility] failure) [facility] Hyperlipidemia, Hypertension, and Obesity. has a past surgical history that includes Hysterectomy, total abdominal; Cholecystectomy; Pacemaker insertion (Left); Cardiac catheterization; Carotid endarterectomy (Left); and Tonsillectomy. Restrictions  Restrictions/Precautions  Restrictions/Precautions: Fall Risk (mod balderas score)  Subjective \"Every minute I work with you is one minute closer to home. \"  Pt reports 0/10 pain during session. General  Patient assessed for rehabilitation services?: Yes      Orientation     Objective    ADL  Grooming: Setup (completion of oral and facial hygiene at edge if bed without support)  LE Dressing: Setup (Pt completed doffing dirty socks and donning new socks with use of reacher and sock aid with increased time.)           Bed mobility  Supine to Sit: Moderate assistance  Sit to Supine: Moderate assistance  Transfers  Sit to stand:  Moderate assistance  Stand to sit: Moderate assistance               Plan   Plan  Times per week: 1-4x/wk  Current Treatment Recommendations: Strengthening, Functional Mobility Training, Endurance Training, Balance Training, Neuromuscular Re-education, Self-Care / ADL, ROM  G-Code     OutComes Score                                                  AM-PAC Score        AM-PAC Inpatient Daily Activity Raw Score: 15 (21)  AM-PAC Inpatient ADL T-Scale Score : 34.69 (21)  ADL Inpatient CMS 0-100% Score: 56.46 (21)  ADL Inpatient CMS G-Code Modifier : CK (21)    Goals  Patient Goals   Patient goals :  To return to home       Therapy Time   Individual Concurrent Group Co-treatment   Time In 1510         Time Out 1535         Minutes 25              ADL/IADL trainin minutes    PRECIOUS Washburn/L Electronically signed by DOMINGO Washburn on  at 3:52 PM EDT

## 2021-08-31 NOTE — PROGRESS NOTES
Pt assessed and tolerated medications well. Pt is A&Ox4. Pt has SOB on exertion when moving around. Pt denies any pain or distress. VSS. Interdry in place of abdominal folds. Will continue to monitor.

## 2021-08-31 NOTE — PROGRESS NOTES
Progress Note  Patient: Janny Hartford  Unit/Bed: V745/M270-24  YOB: 1940  MRN: [id number]  Acct: [de-identified]   Admitting Diagnosis: Hyperkalemia [E87.5]  Acute renal failure, unspecified acute renal failure type [facility]) [N17.9]  Acute congestive heart failure, unspecified heart failure type [facility]) [I50.9]  Admit Date:  2021  Hospital Day: 4    Chief Complaint: JOSIAH HyperK CHF    Histories:  Past Medical History:   Diagnosis Date    Atrial fibrillation ([facility] [facility]     CAD (coronary artery disease)     Cancer ([facility]     CHF (congestive heart failure) ([facility]     Hyperlipidemia     Hypertension     Obesity      Past Surgical History:   Procedure Laterality Date    CARDIAC CATHETERIZATION      CAROTID ENDARTERECTOMY Left     CHOLECYSTECTOMY      HYSTERECTOMY, TOTAL ABDOMINAL      PACEMAKER INSERTION Left     TONSILLECTOMY       History reviewed. No pertinent family history. Social History     Socioeconomic History    Marital status:      Spouse name: None    Number of children: None    Years of education: None    Highest education level: None   Occupational History    None   Tobacco Use    Smoking status: Former Smoker     Quit date: 2016     Years since quittin.0    Smokeless tobacco: Never Used   Vaping Use    Vaping Use: Never used   Substance and Sexual Activity    Alcohol use: Yes     Comment: socially    Drug use: Never    Sexual activity: None   Other Topics Concern    None   Social History Narrative    None     Social Determinants of Health     Financial Resource Strain:     Difficulty of Paying Living Expenses:    Food Insecurity:     Worried About Running Out of Food in the Last Year:     [address] in the Last Year:    Transportation Needs:     Lack of Transportation (Medical):      Lack of Transportation (Non-Medical):    Physical Activity:     Days of Exercise per Week:     Minutes of Exercise per Session:    Stress:     Feeling of Stress : Social Connections:     Frequency of Communication with Friends and Family:     Frequency of Social Gatherings with Friends and Family:     Attends [employer] Services:     Active Member of Clubs or Organizations:     Attends Club or Organization Meetings:     Marital Status:    Intimate Partner Violence:     Fear of Current or Ex-Partner:     Emotionally Abused:     Physically Abused:     Sexually Abused:        Subjective/HPI slept better. eating ok. No cp no sob. LE edeam improving    EKG: AV paced 60        Review of Systems:   Review of Systems   Constitutional: Negative. Negative for diaphoresis and fatigue. HENT: Negative. Eyes: Negative. Respiratory: Negative. Negative for cough, chest tightness, shortness of breath, wheezing and stridor. Cardiovascular: Positive for leg swelling. Negative for chest pain and palpitations. Gastrointestinal: Negative. Negative for blood in stool and nausea. Genitourinary: Negative. Musculoskeletal: Negative. Skin: Negative. Neurological: Positive for weakness. Negative for dizziness, syncope and light-headedness. Hematological: Negative. Psychiatric/Behavioral: Negative. Physical Examination:    BP (!) 93/41   Pulse 62   Temp 98.8 °F (37.1 °C) (Oral)   Resp 20   Ht 5' 3\" (1.6 m)   Wt 267 lb 3.2 oz (121.2 kg)   SpO2 92%   BMI 47.33 kg/m²    Physical Exam   Constitutional: She appears healthy. No distress. HENT:   Normal cephalic and Atraumatic   Eyes: Pupils are equal, round, and reactive to light. Neck: Thyroid normal. No JVD present. No neck adenopathy. No thyromegaly present. Cardiovascular: Normal rate, regular rhythm, normal heart sounds, intact distal pulses and normal pulses. Pulmonary/Chest: Effort normal and breath sounds normal. She has no wheezes. She has no rales. She exhibits no tenderness. Abdominal: Soft. Bowel sounds are normal. There is no abdominal tenderness.    Musculoskeletal: General: No tenderness or edema (2+). Normal range of motion. Cervical back: Normal range of motion and neck supple. Neurological: She is alert and oriented to person, place, and time. Skin: Skin is warm. No cyanosis. Nails show no clubbing.        LABS:  CBC:   Lab Results   Component Value Date    WBC 4.6 08/31/2021    RBC 2.73 08/31/2021    HGB 8.4 08/31/2021    HCT 25.9 08/31/2021    MCV 95.2 08/31/2021    MCH 30.9 08/31/2021    MCHC 32.5 08/31/2021    RDW 15.4 08/31/2021     08/31/2021     CBC with Differential:    Lab Results   Component Value Date    WBC 4.6 08/31/2021    RBC 2.73 08/31/2021    HGB 8.4 08/31/2021    HCT 25.9 08/31/2021     08/31/2021    MCV 95.2 08/31/2021    MCH 30.9 08/31/2021    MCHC 32.5 08/31/2021    RDW 15.4 08/31/2021    LYMPHOPCT 29.1 08/31/2021    MONOPCT 15.1 08/31/2021    BASOPCT 0.8 08/31/2021    MONOSABS 0.7 08/31/2021    LYMPHSABS 1.3 08/31/2021    EOSABS 0.1 08/31/2021    BASOSABS 0.0 08/31/2021     CMP:    Lab Results   Component Value Date     08/31/2021    K 4.4 08/31/2021     08/31/2021    CO2 29 08/31/2021    BUN 57 08/31/2021    CREATININE 2.61 08/31/2021    GFRAA 21.3 08/31/2021    LABGLOM 17.6 08/31/2021    GLUCOSE 95 08/31/2021    PROT 5.6 08/31/2021    LABALBU 2.6 08/31/2021    CALCIUM 7.8 08/31/2021    BILITOT 0.6 08/31/2021    ALKPHOS 47 08/31/2021    AST 12 08/31/2021    ALT <5 08/31/2021     BMP:    Lab Results   Component Value Date     08/31/2021    K 4.4 08/31/2021     08/31/2021    CO2 29 08/31/2021    BUN 57 08/31/2021    LABALBU 2.6 08/31/2021    CREATININE 2.61 08/31/2021    CALCIUM 7.8 08/31/2021    GFRAA 21.3 08/31/2021    LABGLOM 17.6 08/31/2021    GLUCOSE 95 08/31/2021     Magnesium:    Lab Results   Component Value Date    MG 2.1 08/31/2021     Troponin:    Lab Results   Component Value Date    TROPONINI <0.010 08/28/2021        Active Hospital Problems    Diagnosis Date Noted    Hyperkalemia [E87.5] 08/27/2021     Priority: Low    JOSIAH on CKD [N17.9, N18.9] 08/27/2021     Priority: Low    Heart failure [I50.9] 08/27/2021     Priority: Low    PAF  [I48.0] 08/27/2021     Priority: Low    CAD [I25.10] 08/27/2021     Priority: Low    HTN [I10] 08/27/2021     Priority: Low    HLD [E78.5] 08/27/2021     Priority: Low        Assessment/Plan:  1. Hyperkalemia - resolved with HD and remains stable at 4.6 this am.   2. A/C SHF- remains volume overloaded. continue iv Lasix. Add back Metolazone 2.5 qd. Follow labs closely. Strict I/Os  3. Hypotension- added Midodrine and titrate - much improved this am.   4. CRT-D - There apears to be malsensing on Telemetry- will interrogate   5. CAD prior LAD stent - continue asa. BB on  Hold due to low BP  6. PAF -   Will start SQ Heparin. Resume Eliquis prior to dc. continue Amiodarone and keep on Telemetry. 7. HPL- resume Statin  8. Moderate MR - will need afterload when BP and Kidneys stable  9. Severe PA HTN RVSP 80 mmHg.         Electronically signed by Dee Street MD on 8/31/2021 at 8:47 AM

## 2021-08-31 NOTE — PROGRESS NOTES
Assessment complete. Patient is alert and oriented. Denies any dizziness, N/V or pain at this time. Is complaining of SOB at rest and with exertion; on 4L O2 with an SpO2 of 92%. Redness in abdominal folds; interdry in place. Denies any other needs at this time and call light is within reach. Snack given.   Electronically signed by Duran Chaves RN on 8/30/2021 at 9:51 PM

## 2021-08-31 NOTE — PROGRESS NOTES
Physical Therapy Med Surg Daily Treatment Note  Facility/Department: [facility]  [facility]       NAME: Napoleon Romo  : 1940 ([de-identified] y.o.)  MRN: [id number]  CODE STATUS: DNR-CCA    Date of Service: 2021    Patient Diagnosis(es): Hyperkalemia [E87.5]  Acute renal failure, unspecified acute renal failure type ([facility] [N17.9]  Acute congestive heart failure, unspecified heart failure type [facility]) [I50.9]   Chief Complaint   Patient presents with    Shortness of Breath     Patient Active Problem List    Diagnosis Date Noted    Hyperkalemia 2021    JOSIAH on CKD 2021    Acute congestive heart failure ([facility] 2021    PAF  2021    CAD 2021    HTN 2021    HLD 2021        Past Medical History:   Diagnosis Date    Atrial fibrillation ([facility]     CAD (coronary artery disease)     Cancer ([facility]     CHF (congestive heart failure) (HCC)     Hyperlipidemia     Hypertension     Obesity      Past Surgical History:   Procedure Laterality Date    CARDIAC CATHETERIZATION      CAROTID ENDARTERECTOMY Left     CHOLECYSTECTOMY      HYSTERECTOMY, TOTAL ABDOMINAL      PACEMAKER INSERTION Left     TONSILLECTOMY         Restrictions  Restrictions/Precautions: Fall Risk (mod balderas score)    SUBJECTIVE   General  Chart Reviewed: Yes  Family / Caregiver Present: No  Subjective  Subjective: \"I got so weak it was finally time to come in to the hospital.\"    Pre-Session Pain Report  Pre Treatment Pain Screening  Pain at present: 0  Scale Used: Numeric Score  Intervention List: Patient able to continue with treatment  Pain Screening  Patient Currently in Pain: No       Post-Session Pain Report  Pain Assessment  Pain Assessment: 0-10  Pain Level: 0         OBJECTIVE        Bed mobility  Supine to Sit: Moderate assistance;2 Person assistance  Sit to Supine: Maximum assistance;2 Person assistance  Comment: HOB elevated, increased time and effort to complete, pt with good effort. Transfers  Sit to Stand: 2 Person Assistance; Moderate Assistance  Stand to sit: 2 Person Assistance; Moderate Assistance  Comment: [employer], RUE pushing from bedrail LUE pulling from braced [employer]. STS x2    Ambulation  Ambulation?: No (pre gait activity focused, Marching in place x 10 seconds.)              Neuromuscular Education  Neuromuscular Comments: sitting EOB balance training, A/P and lateral weight shifting, pt sitting focusing on maintaining midline while vitals being monitored. Activity Tolerance  Activity Tolerance: Patient Tolerated treatment well          ASSESSMENT   Assessment: pt SOB with exertion, increased time and effort for all tasks, increased time to monitor vitals. Discharge Recommendations:  Continue to assess pending progress    Goals  Short term goals  Short term goal 1: Pt will demonstrate HEP indep  Long term goals  Long term goal 1: Pt will demonstrate transfers mod indep with safest AD  Long term goal 2: Pt will demonstrate amb 100ft mod indep with safest AD  Long term goal 3: Pt will demonstrate standing tolerance 5 min without UE support    PLAN    Times per week: 3-6  Safety Devices  Type of devices: All fall risk precautions in place, Bed alarm in place, Call light within reach, Left in bed     AMPAC (6 CLICK) BASIC MOBILITY  AM-PAC Inpatient Mobility Raw Score : 10      Therapy Time   Individual   Time In 1048   Time Out 1111   Minutes 23      BM/Trsf: 13  NM: 135 [address], [facility], 08/31/21 at 11:31 AM         Definitions for assistance levels  Independent = pt does not require any physical supervision or assistance from another person for activity completion. Device may be needed.   Stand by assistance = pt requires verbal cues or instructions from another person, close to but not touching, to perform the activity  Minimal assistance= pt performs 75% or more of the activity; assistance is required to complete the activity  Moderate assistance= pt performs 50% of the activity; assistance is required to complete the activity  Maximal assistance = pt performs 25% of the activity; assistance is required to complete the activity  Dependent = pt requires total physical assistance to accomplish the task

## 2021-08-31 NOTE — PROGRESS NOTES
Renal Progress Note    Assessment and Plan:    [de-identified] yo lady lady with ckd stage 3. B/l cr mid 1's. Risk factors of CAD, CHF EF 35%. Borderline DM. Last cr was 1.3 in June. Now with JOSIAH. Appears to be ATN. Cause of ATN not entirely clear. Her urine indices are indicative of prerenal azotemia though. Fractional excretion of urea is 11% and also has a low fractional excretion of sodium as well. No NSAID, dye, ace/arb. Was on abx but appears to be keflex which should be ok. Previous imaging and UA are ok. Has sig fluid overload. bp on low side.  k in 8's. Despite medical therapy. Was emergently dialyzed on admission. Making some urine now.   Renal ultrasound is okay.     Plan/  - continue IV Lasix 80 twice a day, diuresing well   - increasing midodrine to 10 mg TID  - renal function relatively stable, doesn't need dialysis at this time, should be ok to go without it    - outpatient follow-up with Dr. Catarina Farfan at [facility]  - starting venofer Qd as well   - getting retacrit weekly   - dispo pending, pt deciding between [address] [facility] and [address]    Patient Active Problem List:     Hyperkalemia     JOSIAH on CKD     Heart failure     PAF      CAD     HTN     HLD      Subjective:   Admit Date: 8/27/2021    Interval History: renal function remains stable, remains on lasix, remains hypotensive       Medications:   Scheduled Meds:   iron sucrose  200 mg IntraVENous Q24H    aspirin  81 mg Oral Daily    atorvastatin  40 mg Oral Nightly    metOLazone  2.5 mg Oral Daily    epoetin makayla-epbx  10,000 Units SubCUTAneous Q7 Days    midodrine  5 mg Oral TID    heparin (porcine)  5,000 Units SubCUTAneous 3 times per day    amiodarone  200 mg Oral Daily    furosemide  80 mg IntraVENous BID    sodium chloride flush  5-40 mL IntraVENous 2 times per day     Continuous Infusions:   dextrose      sodium chloride         CBC:   Recent Labs     08/30/21  0737 08/31/21  0649   WBC 4.1* 4.6*   HGB 8.2* 8.4*    133 CMP:    Recent Labs     08/30/21  0737 08/30/21  1938 08/31/21  0649    138 136   K 4.6 4.6 4.4    102 101   CO2 25 27 29   BUN 57* 55* 57*   CREATININE 2.41* 2.50* 2.61*   GLUCOSE 85 124* 95   CALCIUM 8.3* 8.3* 7.8*   LABGLOM 19.3* 18.5* 17.6*     Troponin:   No results for input(s): TROPONINI in the last 72 hours. BNP: No results for input(s): BNP in the last 72 hours. INR: No results for input(s): INR in the last 72 hours. Lipids: No results for input(s): CHOL, LDLDIRECT, TRIG, HDL, AMYLASE, LIPASE in the last 72 hours. Liver:   Recent Labs     08/31/21  0649   AST 12   ALT <5   ALKPHOS 47   PROT 5.6*   LABALBU 2.6*   BILITOT 0.6     Iron:    Recent Labs     08/30/21  0737   FERRITIN 25.5     Urinalysis: No results for input(s): UA in the last 72 hours.     Objective:   Vitals: BP (!) 93/41   Pulse 62   Temp 98.8 °F (37.1 °C) (Oral)   Resp 20   Ht 5' 3\" (1.6 m)   Wt 267 lb 3.2 oz (121.2 kg)   SpO2 92%   BMI 47.33 kg/m²    Wt Readings from Last 3 Encounters:   08/28/21 267 lb 3.2 oz (121.2 kg)      24HR INTAKE/OUTPUT:      Intake/Output Summary (Last 24 hours) at 8/31/2021 1324  Last data filed at 8/31/2021 9670  Gross per 24 hour   Intake 510 ml   Output 1100 ml   Net -590 ml       General: alert, in no apparent distress, obese   HEENT: normocephalic, atraumatic, anicteric  Lungs: non-labored respirations, clear to auscultation bilaterally  Heart: regular rate and rhythm, no murmurs or rubs  Abdomen: soft, non-tender, non-distended  Ext: no cyanosis, ++ peripheral edema  Neuro: alert and oriented, no gross abnormalities          Electronically signed by Marcus Maciel MD on 8/31/2021 at 1:24 PM

## 2021-09-01 NOTE — PROGRESS NOTES
Department of Internal Medicine  General Internal Medicine  Attending Progress Note      SUBJECTIVE:  Pt seen and examined. No new symptoms. OBJECTIVE      Medications    Current Facility-Administered Medications: [START ON 9/2/2021] metOLazone (ZAROXOLYN) tablet 5 mg, 5 mg, Oral, Daily  bumetanide (BUMEX) tablet 2 mg, 2 mg, Oral, BID  iron sucrose (VENOFER) 200 mg in sodium chloride 0.9 % 100 mL IVPB, 200 mg, IntraVENous, Q24H  midodrine (PROAMATINE) tablet 10 mg, 10 mg, Oral, TID  heparin (porcine) injection 5,000 Units, 5,000 Units, SubCUTAneous, 3 times per day  aspirin EC tablet 81 mg, 81 mg, Oral, Daily  atorvastatin (LIPITOR) tablet 40 mg, 40 mg, Oral, Nightly  epoetin makayla-epbx (RETACRIT) injection 10,000 Units, 10,000 Units, SubCUTAneous, Q7 Days  amiodarone (CORDARONE) tablet 200 mg, 200 mg, Oral, Daily  glucose (GLUTOSE) 40 % oral gel 15 g, 15 g, Oral, PRN  dextrose 50 % IV solution, 12.5 g, IntraVENous, PRN  glucagon (rDNA) injection 1 mg, 1 mg, IntraMUSCular, PRN  dextrose 5 % solution, 100 mL/hr, IntraVENous, PRN  heparin (porcine) injection 1,000 Units, 1,000 Units, IntraVENous, PRN  albuterol (PROVENTIL) nebulizer solution 2.5 mg, 2.5 mg, Nebulization, Q4H PRN  sodium chloride flush 0.9 % injection 5-40 mL, 5-40 mL, IntraVENous, 2 times per day  sodium chloride flush 0.9 % injection 5-40 mL, 5-40 mL, IntraVENous, PRN  0.9 % sodium chloride infusion, 25 mL, IntraVENous, PRN  ondansetron (ZOFRAN-ODT) disintegrating tablet 4 mg, 4 mg, Oral, Q8H PRN **OR** ondansetron (ZOFRAN) injection 4 mg, 4 mg, IntraVENous, Q6H PRN  acetaminophen (TYLENOL) tablet 650 mg, 650 mg, Oral, Q6H PRN **OR** acetaminophen (TYLENOL) suppository 650 mg, 650 mg, Rectal, Q6H PRN  Physical    VITALS:  BP (!) 95/41   Pulse 58   Temp 97.5 °F (36.4 °C) (Oral)   Resp 16   Ht 5' 3\" (1.6 m)   Wt 278 lb 14.1 oz (126.5 kg)   SpO2 96%   BMI 49.40 kg/m²   Constitutional: Awake and alert in no acute distress.  Lying in bed comfortably  Head: Normocephalic, atraumatic  Eyes: EOMI, PERRLA  ENT: moist mucous membranes  Neck: neck supple, trachea midline, RIJ CVC in place  Lungs: Good inspiratory effort, no wheeze, no rhonchi, bibasilar crackles  Heart: RRR, normal S1 and S2  GI: Soft, non-distended, non tender, no guarding, no rebound, +BS  MSK: 2+ pitting edema noted bilateral LE's  Skin: warm, dry  Psych: appropriate affect     Data    CBC:   Lab Results   Component Value Date    WBC 4.0 09/01/2021    RBC 2.91 09/01/2021    HGB 8.9 09/01/2021    HCT 27.1 09/01/2021    MCV 93.0 09/01/2021    MCH 30.4 09/01/2021    MCHC 32.7 09/01/2021    RDW 15.6 09/01/2021     09/01/2021     CMP:    Lab Results   Component Value Date     09/01/2021    K 3.5 09/01/2021    CL 95 09/01/2021    CO2 27 09/01/2021    BUN 58 09/01/2021    CREATININE 2.50 09/01/2021    GFRAA 22.4 09/01/2021    LABGLOM 18.5 09/01/2021    GLUCOSE 98 09/01/2021    PROT 5.5 09/01/2021    LABALBU 2.4 09/01/2021    CALCIUM 7.9 09/01/2021    BILITOT 0.5 09/01/2021    ALKPHOS 45 09/01/2021    AST 13 09/01/2021    ALT <5 09/01/2021       ASSESSMENT AND PLAN      # Acute renal failure with associated volume overload and hyperkalemia  - hx of CKD3b  - HD cath placed on admission and urgent dialysis initiated- renal doesn't think HD is needed  - K initially 8.3 despite multiple interventions (insulin, kayexelate, lasix). Improved after dialysis  - nephrology consulted- IV lasix, lokelma  - renal US with L renal cyst  - O2 as needed - wean as tolerated  - PT/OT  - echo with EF 30%  - avoid nephrotoxic meds  - replace electrolytes as needed    # Hx of systolic CHF  - echo pending. Previous EF 35% and severe PAH  - sp AICD placement  - monitor on tele  - IV lasix and dialysis for fluid removal  - cardiology on consult-seen by Dr. Liisa Peabody. Continue on IV diuresis    #Hypotension  -Possibly due to diuresis. Started on midodrine by cardiologist to allow for continued diuresis.     # PAF/CAD/HTN/HLD  - cont home meds, holding BP meds in setting of hypotension  - holding nephrotoxic meds    # GOC  - palliative consulted-CODE STATUS-DNR CCA  -Appreciate palliative care help    DVT: on Eliquis    Disposition: Will need to monitor renal function, volume status, and electrolytes closely to determine if further dialysis needed. Monitoring UOP. Nephrology and cardiology consulted. PT/OT. Will need SNF on discharge.       Tra Cm,    Internal Medicine

## 2021-09-01 NOTE — CARE COORDINATION
LSW spoke with patient and son, Raul Coyle at bedside this afternoon. Patient would like to have her referral sent to [facility] and [employer] for transfer at discharge. LSW notified Isrrael/Liaison of this request. Awaiting acceptance at this time.   Electronically signed by ESTELA Griffin, ERIC on 9/1/21 at 2:59 PM EDT

## 2021-09-01 NOTE — PROGRESS NOTES
Progress Note  Patient: Napoleon Romo  Unit/Bed: T198/D451-75  YOB: 1940  MRN: [id number]  Acct: [de-identified]   Admitting Diagnosis: Hyperkalemia [E87.5]  Acute renal failure, unspecified acute renal failure type [facility]) [N17.9]  Acute congestive heart failure, unspecified heart failure type [facility]) [I50.9]  Admit Date:  2021  Hospital Day: 5    Chief Complaint: JOSIAH HyperK CHF    Histories:  Past Medical History:   Diagnosis Date    [facility]     CAD (coronary artery disease)     Cancer ([facility]     CHF (congestive heart failure) ([facility]     Hyperlipidemia     Hypertension     Obesity      Past Surgical History:   Procedure Laterality Date    CARDIAC CATHETERIZATION      CAROTID ENDARTERECTOMY Left     CHOLECYSTECTOMY      HYSTERECTOMY, TOTAL ABDOMINAL      PACEMAKER INSERTION Left     TONSILLECTOMY       History reviewed. No pertinent family history. Social History     Socioeconomic History    Marital status:      Spouse name: None    Number of children: None    Years of education: None    Highest education level: None   Occupational History    None   Tobacco Use    Smoking status: Former Smoker     Quit date: 2016     Years since quittin.0    Smokeless tobacco: Never Used   Vaping Use    Vaping Use: Never used   Substance and Sexual Activity    Alcohol use: Yes     Comment: socially    Drug use: Never    Sexual activity: None   Other Topics Concern    None   Social History Narrative    None     Social Determinants of Health     Financial Resource Strain:     Difficulty of Paying Living Expenses:    Food Insecurity:     Worried About Running Out of Food in the Last Year:     [address] in the Last Year:    Transportation Needs:     Lack of Transportation (Medical):      Lack of Transportation (Non-Medical):    Physical Activity:     Days of Exercise per Week:     Minutes of Exercise per Session:    Stress:     Feeling of Stress : Musculoskeletal:         General: No tenderness or edema (2+). Normal range of motion. Cervical back: Normal range of motion and neck supple. Neurological: She is alert and oriented to person, place, and time. Skin: Skin is warm. No cyanosis. Nails show no clubbing.        LABS:  CBC:   Lab Results   Component Value Date    WBC 4.6 08/31/2021    RBC 2.73 08/31/2021    HGB 8.4 08/31/2021    HCT 25.9 08/31/2021    MCV 95.2 08/31/2021    MCH 30.9 08/31/2021    MCHC 32.5 08/31/2021    RDW 15.4 08/31/2021     08/31/2021     CBC with Differential:    Lab Results   Component Value Date    WBC 4.6 08/31/2021    RBC 2.73 08/31/2021    HGB 8.4 08/31/2021    HCT 25.9 08/31/2021     08/31/2021    MCV 95.2 08/31/2021    MCH 30.9 08/31/2021    MCHC 32.5 08/31/2021    RDW 15.4 08/31/2021    LYMPHOPCT 29.1 08/31/2021    MONOPCT 15.1 08/31/2021    BASOPCT 0.8 08/31/2021    MONOSABS 0.7 08/31/2021    LYMPHSABS 1.3 08/31/2021    EOSABS 0.1 08/31/2021    BASOSABS 0.0 08/31/2021     CMP:    Lab Results   Component Value Date     08/31/2021    K 3.9 08/31/2021    CL 99 08/31/2021    CO2 27 08/31/2021    BUN 56 08/31/2021    CREATININE 2.38 08/31/2021    GFRAA 23.7 08/31/2021    LABGLOM 19.6 08/31/2021    GLUCOSE 113 08/31/2021    PROT 5.6 08/31/2021    LABALBU 2.6 08/31/2021    CALCIUM 8.1 08/31/2021    BILITOT 0.6 08/31/2021    ALKPHOS 47 08/31/2021    AST 12 08/31/2021    ALT <5 08/31/2021     BMP:    Lab Results   Component Value Date     08/31/2021    K 3.9 08/31/2021    CL 99 08/31/2021    CO2 27 08/31/2021    BUN 56 08/31/2021    LABALBU 2.6 08/31/2021    CREATININE 2.38 08/31/2021    CALCIUM 8.1 08/31/2021    GFRAA 23.7 08/31/2021    LABGLOM 19.6 08/31/2021    GLUCOSE 113 08/31/2021     Magnesium:    Lab Results   Component Value Date    MG 2.1 08/31/2021     Troponin:    Lab Results   Component Value Date    TROPONINI <0.010 08/28/2021        Active Hospital Problems    Diagnosis Date Noted    Hyperkalemia [E87.5] 08/27/2021     Priority: Low    JOSIAH on CKD [N17.9, N18.9] 08/27/2021     Priority: Low    Acute congestive heart failure ([address] [I50.9] 08/27/2021     Priority: Low    PAF  [I48.0] 08/27/2021     Priority: Low    CAD [I25.10] 08/27/2021     Priority: Low    HTN [I10] 08/27/2021     Priority: Low    HLD [E78.5] 08/27/2021     Priority: Low        Assessment/Plan:  1. Hyperkalemia - resolved with HD and remains stable at 3.9 this am.   2. A/C SHF- remains volume overloaded. continue iv Lasix. Added back Metolazone 2.5 qd. Diuresed 1L overnight. Follow labs closely. Strict I/Os  3. Hypotension- is limiting ability to diurese more aggressively. Advance Midodrine to 10 tid. 4.  CRT-D - There apears to be malsensing on Telemetry- devie interrogated and reviewed. Adjustments made accordingly yesterday. appears stable this am.   5. CAD prior LAD stent - continue asa. BB on  Hold due to low BP  6. PAF -   Will start SQ Heparin. Resume Eliquis prior to dc. continue Amiodarone and keep on Telemetry. 7. HPL- resume Statin  8. Moderate MR - will need afterload when BP and Kidneys stable  9. Severe PA HTN RVSP 80 mmHg.         Electronically signed by Valentino Golden MD on 9/1/2021 at 7:59 AM

## 2021-09-01 NOTE — PROGRESS NOTES
Palliative Care ProgressNote  Patient: Rosita Georges  Gender: female  YOB: 1940  Unit/Bed: B172/T336-70  Code Status: DNR-CCA  Inpatient Treatment Team: Treatment Team: Attending Provider: Sofía San DO; Consulting Physician: Divya Ramirez MD; Consulting Physician: Araceli Siddiqui MD; Utilization Reviewer: Mirela Lara, RN; : Gene Pinzon, RN; : Wanda Perkins, MSW, LSW; Utilization Reviewer: Rafita Green, GHADA; Registered Nurse: Bozena Wise RN  Admit Date:  8/27/2021    Chief Complaint: Goals of Care    History of Presenting Illness:      Rosita Georges is a [de-identified] y.o. female on hospital day 5 with a history of  CAD, PAF on Eliquis status post pacemaker, CKD, CHF, presented with SOB and increased edema. She had stopped taking furosemide on her own as she had started an antibiotic ( keflex) and was worried the combination of antibiotics and diuretics would harm her renal function, continued potassium supplementation. She was found to be hyperkalemic (8.5), fluid overload, and JOSIAH. Required hemodialysis due to elevated K despite multiple interventions. IV lasix. EF 30 % Edema improving. It does not appear she will need dialysis again at this time. Negative pain, SOB, GI or  complaints, appetite stable. No significant emotional, spiritual, or sleep disturbance. 9/1/21     Resting comfortably in bed, NAD, no SOB, chest pain, cough. BLE improved, but hypotension limits aggressive dialysis. Comfortable at rest, but any exertion makes her symptomatic. Attempting to participate in therapy, but limited by dizziness and fatigue. Review of Systems:       Review of Systems   Constitutional: Positive for fatigue. Negative for activity change, appetite change, chills, diaphoresis, fever and unexpected weight change. HENT: Negative for drooling, hearing loss, mouth sores, sore throat, trouble swallowing and voice change.     Eyes: Negative for discharge and visual disturbance. Respiratory: Negative for apnea, cough, choking, chest tightness, shortness of breath, wheezing and stridor. Cardiovascular: Negative for chest pain, palpitations and leg swelling. Gastrointestinal: Negative for abdominal distention, abdominal pain, anal bleeding, blood in stool, constipation, diarrhea, nausea, rectal pain and vomiting. Genitourinary: Negative for difficulty urinating, dysuria, enuresis, frequency and hematuria. Musculoskeletal: Negative for arthralgias, back pain, gait problem, joint swelling and myalgias. Skin: Negative for color change, pallor, rash and wound. Allergic/Immunologic: Negative for food allergies and immunocompromised state. Neurological: Positive for dizziness. Negative for tremors, seizures, syncope, facial asymmetry, speech difficulty, weakness, light-headedness, numbness and headaches. Hematological: Negative for adenopathy. Does not bruise/bleed easily. Psychiatric/Behavioral: Negative for agitation, behavioral problems, confusion, decreased concentration, dysphoric mood, hallucinations, self-injury, sleep disturbance and suicidal ideas. The patient is not nervous/anxious and is not hyperactive. Physical Examination:       BP (!) 95/41   Pulse 58   Temp 97.5 °F (36.4 °C) (Oral)   Resp 16   Ht 5' 3\" (1.6 m)   Wt 278 lb 14.1 oz (126.5 kg)   SpO2 96%   BMI 49.40 kg/m²    Physical Exam  Constitutional:       General: She is not in acute distress. Appearance: She is well-developed. She is not diaphoretic. HENT:      Head: Normocephalic and atraumatic. Right Ear: External ear normal.      Left Ear: External ear normal.      Nose: Nose normal.      Mouth/Throat:      Pharynx: No oropharyngeal exudate. Eyes:      General: No scleral icterus. Right eye: No discharge. Left eye: No discharge. Conjunctiva/sclera: Conjunctivae normal.      Pupils: Pupils are equal, round, and reactive to light.    Neck: Thyroid: No thyromegaly. Vascular: No JVD. Trachea: No tracheal deviation. Cardiovascular:      Rate and Rhythm: Normal rate and regular rhythm. Heart sounds: Normal heart sounds. Pulmonary:      Effort: Pulmonary effort is normal. No respiratory distress. Breath sounds: Normal breath sounds. No stridor. No wheezing or rales. Chest:      Chest wall: No tenderness. Abdominal:      General: Bowel sounds are normal. There is no distension. Palpations: Abdomen is soft. There is no mass. Tenderness: There is no abdominal tenderness. There is no guarding or rebound. Musculoskeletal:         General: No tenderness or deformity. Normal range of motion. Cervical back: Normal range of motion and neck supple. Lymphadenopathy:      Cervical: No cervical adenopathy. Skin:     General: Skin is warm and dry. Capillary Refill: Capillary refill takes less than 2 seconds. Findings: No erythema or rash. Neurological:      Mental Status: She is alert and oriented to person, place, and time. Psychiatric:         Behavior: Behavior normal.         Thought Content: Thought content normal.         Allergies:       Allergies   Allergen Reactions    Codeine      Other reaction(s): GI Upset  nausea & headaches         Medications:      Current Facility-Administered Medications   Medication Dose Route Frequency Provider Last Rate Last Admin    [START ON 9/2/2021] metOLazone (ZAROXOLYN) tablet 5 mg  5 mg Oral Daily Castillo Raymond DO        bumetanide (BUMEX) tablet 2 mg  2 mg Oral BID Roxie Stubbs DO   2 mg at 09/01/21 1151    iron sucrose (VENOFER) 200 mg in sodium chloride 0.9 % 100 mL IVPB  200 mg IntraVENous Q24H Marietta Hernandez  mL/hr at 09/01/21 1355 200 mg at 09/01/21 1355    midodrine (PROAMATINE) tablet 10 mg  10 mg Oral TID Titus Byrnes MD   10 mg at 09/01/21 1355    heparin (porcine) injection 5,000 Units  5,000 Units SubCUTAneous 3 times per day [facility], DO   5,000 Units at 09/01/21 1354    aspirin EC tablet 81 mg  81 mg Oral Daily Lawrence Franklin MD   81 mg at 09/01/21 1041    atorvastatin (LIPITOR) tablet 40 mg  40 mg Oral Nightly Lawrence Franklin MD   40 mg at 08/31/21 2054    epoetin makayla-epbx (RETACRIT) injection 10,000 Units  10,000 Units SubCUTAneous Q7 Days Edil Srivastava MD   10,000 Units at 08/29/21 2311    amiodarone (CORDARONE) tablet 200 mg  200 mg Oral Daily Lawrence Franklin MD   200 mg at 09/01/21 1041    glucose (GLUTOSE) 40 % oral gel 15 g  15 g Oral PRN Edil Srivastava MD        dextrose 50 % IV solution  12.5 g IntraVENous PRN Edil Srivastava MD        glucagon (rDNA) injection 1 mg  1 mg IntraMUSCular PRN Edil Srivastava MD        dextrose 5 % solution  100 mL/hr IntraVENous PRN Edil Srivastava MD        heparin (porcine) injection 1,000 Units  1,000 Units IntraVENous PRN Edil Srivastava MD        albuterol (PROVENTIL) nebulizer solution 2.5 mg  2.5 mg Nebulization Q4H PRN [facility] DO        sodium chloride flush 0.9 % injection 5-40 mL  5-40 mL IntraVENous 2 times per day Nicoletto Bolzan-Roche, APRN - CNP   10 mL at 09/01/21 1041    sodium chloride flush 0.9 % injection 5-40 mL  5-40 mL IntraVENous PRN Nicoletto Bolzan-Roche, APRN - CNP        0.9 % sodium chloride infusion  25 mL IntraVENous PRN Nicoletto Bolzan-Roche, APRN - CNP        ondansetron (ZOFRAN-ODT) disintegrating tablet 4 mg  4 mg Oral Q8H PRN Nicoletto Bolzan-Roche, APRN - CNP        Or    ondansetron (ZOFRAN) injection 4 mg  4 mg IntraVENous Q6H PRN Nicoletto Bolzan-Roche, APRN - CNP        acetaminophen (TYLENOL) tablet 650 mg  650 mg Oral Q6H PRN Nicoletto Bolzan-Roche, APRN - CNP        Or    acetaminophen (TYLENOL) suppository 650 mg  650 mg Rectal Q6H PRN Nicositao Bolzan-Roche, APRN - CNP           History:      PM Hx:  Past Medical History:   Diagnosis Date    [facility]     CAD (coronary artery disease)     Cancer ([facility]  CHF (congestive heart failure) (HCC)     Hyperlipidemia     Hypertension     Obesity        PS Hx:  Past Surgical History:   Procedure Laterality Date    CARDIAC CATHETERIZATION      CAROTID ENDARTERECTOMY Left     CHOLECYSTECTOMY      HYSTERECTOMY, TOTAL ABDOMINAL      PACEMAKER INSERTION Left     TONSILLECTOMY         Social Hx:  Social History     Socioeconomic History    Marital status:      Spouse name: None    Number of children: None    Years of education: None    Highest education level: None   Occupational History    None   Tobacco Use    Smoking status: Former Smoker     Quit date: 2016     Years since quittin.0    Smokeless tobacco: Never Used   Vaping Use    Vaping Use: Never used   Substance and Sexual Activity    Alcohol use: Yes     Comment: socially    Drug use: Never    Sexual activity: None   Other Topics Concern    None   Social History Narrative    None     Social Determinants of Health     Financial Resource Strain:     Difficulty of Paying Living Expenses:    Food Insecurity:     Worried About Running Out of Food in the Last Year:     [address] in the Last Year:    Transportation Needs:     Lack of Transportation (Medical):  Lack of Transportation (Non-Medical):    Physical Activity:     Days of Exercise per Week:     Minutes of Exercise per Session:    Stress:     Feeling of Stress :    Social Connections:     Frequency of Communication with Friends and Family:     Frequency of Social Gatherings with Friends and Family:     Attends Congregation Services:     Active Member of Clubs or Organizations:     Attends Club or Organization Meetings:     Marital Status:    Intimate Partner Violence:     Fear of Current or Ex-Partner:     Emotionally Abused:     Physically Abused:     Sexually Abused:        Family Hx:  History reviewed. No pertinent family history.     LABS: Reviewed     CBC:  Lab Results   Component Value Date    WBC 4.0 09/01/2021    RBC 2.91 09/01/2021    HGB 8.9 09/01/2021    HCT 27.1 09/01/2021    MCV 93.0 09/01/2021    MCH 30.4 09/01/2021    MCHC 32.7 09/01/2021    RDW 15.6 09/01/2021     09/01/2021     CBC with Differential:   Lab Results   Component Value Date    WBC 4.0 09/01/2021    RBC 2.91 09/01/2021    HGB 8.9 09/01/2021    HCT 27.1 09/01/2021     09/01/2021    MCV 93.0 09/01/2021    MCH 30.4 09/01/2021    MCHC 32.7 09/01/2021    RDW 15.6 09/01/2021    LYMPHOPCT 23.6 09/01/2021    MONOPCT 16.7 09/01/2021    BASOPCT 1.0 09/01/2021    MONOSABS 0.6 09/01/2021    LYMPHSABS 0.9 09/01/2021    EOSABS 0.1 09/01/2021    BASOSABS 0.0 09/01/2021     CMP:    Lab Results   Component Value Date     09/01/2021    K 3.5 09/01/2021    CL 95 09/01/2021    CO2 27 09/01/2021    BUN 58 09/01/2021    CREATININE 2.50 09/01/2021    GFRAA 22.4 09/01/2021    LABGLOM 18.5 09/01/2021    GLUCOSE 98 09/01/2021    PROT 5.5 09/01/2021    LABALBU 2.4 09/01/2021    CALCIUM 7.9 09/01/2021    BILITOT 0.5 09/01/2021    ALKPHOS 45 09/01/2021    AST 13 09/01/2021    ALT <5 09/01/2021     BMP:    Lab Results   Component Value Date     09/01/2021    K 3.5 09/01/2021    CL 95 09/01/2021    CO2 27 09/01/2021    BUN 58 09/01/2021    LABALBU 2.4 09/01/2021    CREATININE 2.50 09/01/2021    CALCIUM 7.9 09/01/2021    GFRAA 22.4 09/01/2021    LABGLOM 18.5 09/01/2021    GLUCOSE 98 09/01/2021     TSH: No results found for: TSH  Vitamin B 12 and Folate: No components found for: FOLIC,  C21  Urinalysis: No results found for: NITRU, [address], BACTERIA, RBCUA, BLOODU, SPECGRAV, GLUCOSEU        FUNCTIONAL ADL´S:     Independent: [ x ] Eating, [ x  ] Dressing, [  x ] Transferring, [ x  ] Toileting, [  x ] Bathing, [ x  ] Continence  Dependent   : [  ] Eating, [   ] Dressing, [   ] Transferring, [   ] Mariah Carpen, [   ] Bathing, [   ] Continence  W. assistant : [  ] Eating, [   ] Dressing, [   ] Transferring, [   ] Mariah Carpen, [   ] Ambrose Love, [   ] Continence    Radiology: Reviewed      No results found. Assessment and plan:      -Advance Care Planning  Discussed goals of care with patient. Explained in extensive detail nuances between full code, DNR CCA and DNR CC. Patient has made the decision to be DNR CCA  Living will and MPOA in place      -Goals of Care Discussion:  Disease process and goals of treatment were discussed in basic terms. Her goal is to optimize available comfort care measures to decrease hospitalizations and maximize function. Plan is SNF with [facility] care to follow       We discussed the palliative care philosophy in light of those goals. We discussed all care options contingent on treatment response and QOL. Much active listening, presence, and emotional support were given. Thank you for allowing me to participate in her care    While hospitalized also treated for;  1. Hyperkalemia  2. JOSIAH on CKD  3. Heart Failure  4. PAF  5. CAD. 6. HTN  7.  HLD    Electronically signed by MILEY Ta CNP on 9/1/2021 at 2:38 PM

## 2021-09-01 NOTE — PROGRESS NOTES
°F (36.8 °C) (Oral)   Resp 16   Ht 5' 3\" (1.6 m)   Wt 278 lb 14.1 oz (126.5 kg)   SpO2 91%   BMI 49.40 kg/m²    Wt Readings from Last 3 Encounters:   09/01/21 278 lb 14.1 oz (126.5 kg)      24HR INTAKE/OUTPUT:      Intake/Output Summary (Last 24 hours) at 9/1/2021 1014  Last data filed at 9/1/2021 0849  Gross per 24 hour   Intake 808 ml   Output 1700 ml   Net -892 ml       General: alert, in no apparent distress  HEENT: normocephalic, atraumatic, anicteric  Pacemaker LUQ  Neck: supple, no mass  Lungs: non-labored respirations, clear to auscultation bilaterally  Heart: regular rate and rhythm, no murmurs or rubs  Abdomen: soft, non-tender, non-distended  obese  Ext: no cyanosis, 2+ peripheral edema  Neuro: alert and oriented, no gross abnormalities  Psych: normal mood and affect  Skin: no rash      Electronically signed by Tammi Daniels DO, MD

## 2021-09-01 NOTE — PROGRESS NOTES
Physical Therapy Med Surg Daily Treatment Note  [facility]  [facility]       NAME: Dione Dancer  : 1940 ([de-identified] y.o.)  MRN: [id number]  CODE STATUS: DNR-CCA    Date of Service: 2021    Patient Diagnosis(es): Hyperkalemia [E87.5]  Acute renal failure, unspecified acute renal failure type ([facility] [N17.9]  Acute congestive heart failure, unspecified heart failure type [facility]) [I50.9]   Chief Complaint   Patient presents with    Shortness of Breath     Patient Active Problem List    Diagnosis Date Noted    Hyperkalemia 2021    JOSIAH on CKD 2021    Acute congestive heart failure ([facility] 2021    PAF  2021    CAD 2021    HTN 2021    HLD 2021        Past Medical History:   Diagnosis Date    Atrial fibrillation ([facility]     CAD (coronary artery disease)     Cancer ([facility]     CHF (congestive heart failure) (HCC)     Hyperlipidemia     Hypertension     Obesity      Past Surgical History:   Procedure Laterality Date    CARDIAC CATHETERIZATION      CAROTID ENDARTERECTOMY Left     CHOLECYSTECTOMY      HYSTERECTOMY, TOTAL ABDOMINAL      PACEMAKER INSERTION Left     TONSILLECTOMY         Restrictions  Restrictions/Precautions: Fall Risk (mod balderas score)    SUBJECTIVE   General  Chart Reviewed: Yes  Family / Caregiver Present: No  Subjective  Subjective: \"I was dizzy when they were getting me cleaned up this morning. \"    Pre-Session Pain Report  Pre Treatment Pain Screening  Pain at present: 0  Scale Used: Numeric Score  Intervention List: Patient able to continue with treatment  Pain Screening  Patient Currently in Pain: No       Post-Session Pain Report  Pain Assessment  Pain Assessment: 0-10  Pain Level: 0         OBJECTIVE        Bed mobility  Supine to Sit: Moderate assistance (Mod A for lifting trunk to EOB and for stability.)  Sit to Supine:  Moderate assistance (pt needing assistance getting BLE into bed.)  Comment: HOB elevated, increased time and effort to complete. vc's for improved technique and efficiency. Transfers  Sit to Stand: 2 Person Assistance; Moderate Assistance  Stand to sit: 2 Person Assistance; Moderate Assistance  Comment: [employer], RUE pushing from bedrail LUE pulling from braced [employer]. STS x2    Ambulation  Ambulation?: No (pre gait activity focused, Marching in place x 10 seconds.)                                         Activity Tolerance  Activity Tolerance: pt limited by nausea and dizziness. ASSESSMENT   Assessment: once standing pt becomes nausea and attempts to vomit but does not. pt c/o dizziness, BP WNL. tx limited by dizziness and nausea. Pt decreased SOB this date. Discharge Recommendations:  Continue to assess pending progress    Goals  Short term goals  Short term goal 1: Pt will demonstrate HEP indep  Long term goals  Long term goal 1: Pt will demonstrate transfers mod indep with safest AD  Long term goal 2: Pt will demonstrate amb 100ft mod indep with safest AD  Long term goal 3: Pt will demonstrate standing tolerance 5 min without UE support    PLAN    Times per week: 3-6  Safety Devices  Type of devices: All fall risk precautions in place, Bed alarm in place, Call light within reach, Left in bed     [facility] (6 CLICK) Lilly [address] Raw Score : 10      Therapy Time   Individual   Time In 0855   Time Out 0912   Minutes 17      BM/Trsf: 15  Gait: 2        Jabier Zapien PTA, 09/01/21 at 10:23 AM         Definitions for assistance levels  Independent = pt does not require any physical supervision or assistance from another person for activity completion. Device may be needed.   Stand by assistance = pt requires verbal cues or instructions from another person, close to but not touching, to perform the activity  Minimal assistance= pt performs 75% or more of the activity; assistance is required to complete the activity  Moderate assistance= pt performs 50% of the activity; assistance is required to complete the activity  Maximal assistance = pt performs 25% of the activity; assistance is required to complete the activity  Dependent = pt requires total physical assistance to accomplish the task

## 2021-09-02 PROBLEM — E55.9 VITAMIN D DEFICIENCY: Status: ACTIVE | Noted: 2017-05-19

## 2021-09-02 PROBLEM — Z95.5 STENTED CORONARY ARTERY: Status: ACTIVE | Noted: 2017-04-20

## 2021-09-02 PROBLEM — I25.5 ISCHEMIC CARDIOMYOPATHY: Status: ACTIVE | Noted: 2017-04-20

## 2021-09-02 PROBLEM — I27.20 PULMONARY HTN (HCC): Status: ACTIVE | Noted: 2021-01-03

## 2021-09-02 PROBLEM — I50.22 CHRONIC SYSTOLIC HEART FAILURE (HCC): Status: ACTIVE | Noted: 2018-07-18

## 2021-09-02 PROBLEM — C54.1 ENDOMETRIAL CANCER (HCC): Status: ACTIVE | Noted: 2021-01-01

## 2021-09-02 PROBLEM — R26.9 GAIT ABNORMALITY: Status: ACTIVE | Noted: 2021-01-01

## 2021-09-02 PROBLEM — N18.4 CKD (CHRONIC KIDNEY DISEASE) STAGE 4, GFR 15-29 ML/MIN (HCC): Status: ACTIVE | Noted: 2019-11-19

## 2021-09-02 PROBLEM — H04.123 DRY EYE SYNDROME, BILATERAL: Status: ACTIVE | Noted: 2020-02-13

## 2021-09-02 PROBLEM — M15.3 SECONDARY OSTEOARTHRITIS OF MULTIPLE SITES: Status: ACTIVE | Noted: 2017-05-19

## 2021-09-02 PROBLEM — I65.29 CAROTID ARTERY STENOSIS: Status: ACTIVE | Noted: 2017-04-20

## 2021-09-02 NOTE — PROGRESS NOTES
Assessment complete. Alert and oriented, calm and cooperative. Repositioned--educated pt on benefits of moving. Right IJ re-secured. Safety maintained. Call light within reach.     Electronically signed by Tereza Montes RN on 9/2/2021 at 12:57 PM

## 2021-09-02 NOTE — PROGRESS NOTES
Progress Note  Patient: Janny Glen Easton  Unit/Bed: N549/R966-90  YOB: 1940  MRN: [id number]  Acct: [de-identified]   Admitting Diagnosis: Hyperkalemia [E87.5]  Acute renal failure, unspecified acute renal failure type [facility]) [N17.9]  Acute congestive heart failure, unspecified heart failure type [facility]) [I50.9]  Admit Date:  2021  Hospital Day: 6    Chief Complaint: JOSIAH HyperK CHF    Histories:  Past Medical History:   Diagnosis Date    Atrial fibrillation ([facility]     CAD (coronary artery disease)     [facility]     [facility]     Hyperlipidemia     Hypertension     Obesity      Past Surgical History:   Procedure Laterality Date    CARDIAC CATHETERIZATION      CAROTID ENDARTERECTOMY Left     CHOLECYSTECTOMY      HYSTERECTOMY, TOTAL ABDOMINAL      PACEMAKER INSERTION Left     TONSILLECTOMY       History reviewed. No pertinent family history. Social History     Socioeconomic History    Marital status:       Spouse name: None    Number of children: None    Years of education: None    Highest education level: None   Occupational History    None   Tobacco Use    Smoking status: Former Smoker     Quit date: 2016     Years since quittin.0    Smokeless tobacco: Never Used   Vaping Use    Vaping Use: Never used   Substance and Sexual Activity    Alcohol use: Yes     Comment: socially    Drug use: Never    Sexual activity: None   Other Topics Concern    None   Social History Narrative     Lives With: Family (grandson; pt is home alone at times)    Type of Home: Apartment (laundry in pt's [address] [facility] in [address]: One level    Bathroom Shower/Tub: Walk-in shower    Bathroom Equipment: Shower chair, Grab bars in shower, Grab bars around toilet    Home Equipment: Rolling walker, Lift chair    ADL Assistance: Independent    Homemaking Assistance: Needs assistance (grandson cooks; grandson shares laundry task) Homemaking Responsibilities: Yes    Ambulation Assistance: Independent ([employer] used at baseline)    Transfer Assistance: Independent    Active : Yes    Additional Comments: sleeping in a recliner         Social Determinants of Health     Financial Resource Strain:     Difficulty of Paying Living Expenses:    Food Insecurity:     Worried About Running Out of Food in the Last Year:     [address] in the Last Year:    Transportation Needs:     Lack of Transportation (Medical):  Lack of Transportation (Non-Medical):    Physical Activity:     Days of Exercise per Week:     Minutes of Exercise per Session:    Stress:     Feeling of Stress :    Social Connections:     Frequency of Communication with Friends and Family:     Frequency of Social Gatherings with Friends and Family:     Attends Judaism Services:     Active Member of Clubs or Organizations:     Attends Club or Organization Meetings:     Marital Status:    Intimate Partner Violence:     Fear of Current or Ex-Partner:     Emotionally Abused:     Physically Abused:     Sexually Abused:        Subjective/HPI  Worked with PT yesterday and felt dizzy upon standing. No PC breathing is better. Diuresing. EKG: AV paced 60        Review of Systems:   Review of Systems   Constitutional: Negative. Negative for diaphoresis and fatigue. HENT: Negative. Eyes: Negative. Respiratory: Negative. Negative for cough, chest tightness, shortness of breath, wheezing and stridor. Cardiovascular: Positive for leg swelling. Negative for chest pain and palpitations. Gastrointestinal: Negative. Negative for blood in stool and nausea. Genitourinary: Negative. Musculoskeletal: Negative. Skin: Negative. Neurological: Positive for weakness. Negative for dizziness, syncope and light-headedness. Hematological: Negative. Psychiatric/Behavioral: Negative.           Physical Examination:    BP (!) 100/39   Pulse 60   Temp 97.7 °F (36.5 °C) (Oral)   Resp 16   Ht 5' 3\" (1.6 m)   Wt 278 lb 14.1 oz (126.5 kg)   SpO2 100%   BMI 49.40 kg/m²    Physical Exam   Constitutional: She appears healthy. No distress. HENT:   Normal cephalic and Atraumatic   Eyes: Pupils are equal, round, and reactive to light. Neck: Thyroid normal. No JVD present. No neck adenopathy. No thyromegaly present. Cardiovascular: Normal rate, regular rhythm, normal heart sounds, intact distal pulses and normal pulses. Pulmonary/Chest: Effort normal and breath sounds normal. She has no wheezes. She has no rales. She exhibits no tenderness. Abdominal: Soft. Bowel sounds are normal. There is no abdominal tenderness. Musculoskeletal:         General: No tenderness or edema (2+). Normal range of motion. Cervical back: Normal range of motion and neck supple. Neurological: She is alert and oriented to person, place, and time. Skin: Skin is warm. No cyanosis. Nails show no clubbing.        LABS:  CBC:   Lab Results   Component Value Date    WBC 3.2 09/02/2021    RBC 2.82 09/02/2021    HGB 8.5 09/02/2021    HCT 26.0 09/02/2021    MCV 92.1 09/02/2021    MCH 30.0 09/02/2021    MCHC 32.6 09/02/2021    RDW 15.8 09/02/2021     09/02/2021     CBC with Differential:    Lab Results   Component Value Date    WBC 3.2 09/02/2021    RBC 2.82 09/02/2021    HGB 8.5 09/02/2021    HCT 26.0 09/02/2021     09/02/2021    MCV 92.1 09/02/2021    MCH 30.0 09/02/2021    MCHC 32.6 09/02/2021    RDW 15.8 09/02/2021    LYMPHOPCT 23.6 09/01/2021    MONOPCT 16.7 09/01/2021    BASOPCT 1.0 09/01/2021    MONOSABS 0.6 09/01/2021    LYMPHSABS 0.9 09/01/2021    EOSABS 0.1 09/01/2021    BASOSABS 0.0 09/01/2021     CMP:    Lab Results   Component Value Date     09/02/2021    K 3.2 09/02/2021    CL 97 09/02/2021    CO2 29 09/02/2021    BUN 59 09/02/2021    CREATININE 2.29 09/02/2021    GFRAA 24.8 09/02/2021    LABGLOM 20.5 09/02/2021    GLUCOSE 78 09/02/2021    PROT 5.5 09/02/2021 LABALBU 2.5 09/02/2021    CALCIUM 8.1 09/02/2021    BILITOT 0.5 09/02/2021    ALKPHOS 45 09/02/2021    AST 12 09/02/2021    ALT <5 09/02/2021     BMP:    Lab Results   Component Value Date     09/02/2021    K 3.2 09/02/2021    CL 97 09/02/2021    CO2 29 09/02/2021    BUN 59 09/02/2021    LABALBU 2.5 09/02/2021    CREATININE 2.29 09/02/2021    CALCIUM 8.1 09/02/2021    GFRAA 24.8 09/02/2021    LABGLOM 20.5 09/02/2021    GLUCOSE 78 09/02/2021     Magnesium:    Lab Results   Component Value Date    MG 1.8 09/02/2021     Troponin:    Lab Results   Component Value Date    TROPONINI <0.010 08/28/2021        Active Hospital Problems    Diagnosis Date Noted    Hyperkalemia [E87.5] 08/27/2021     Priority: Low    JOSIAH on CKD [N17.9, N18.9] 08/27/2021     Priority: Low    Acute congestive heart failure ([facility] [I50.9] 08/27/2021     Priority: Low    PAF  [I48.0] 08/27/2021     Priority: Low    CAD [I25.10] 08/27/2021     Priority: Low    HTN [I10] 08/27/2021     Priority: Low    HLD [E78.5] 08/27/2021     Priority: Low    Ischemic cardiomyopathy [I25.5] 04/20/2017     Priority: Low        Assessment/Plan:  1. Hyperkalemia - resolved. K is now low with diuretics. Add low dose K. Follow labs  2. A/C SHF- remains volume overloaded. continue iv Lasix. Adna=cisneros Metolazone to 5 qd. Diuresed 1L overnight. Follow labs closely. Strict I/Os  3. Hypotension- is limiting ability to diurese more aggressively. Advance Midodrine to 10 tid. 4.  CRT-D - There apears to be malsensing on Telemetry- devie interrogated and reviewed. Adjustments made accordingly yesterday. appears stable this am.   5. LVEF 30%  6. CAD prior LAD stent - continue asa. BB on  Hold due to low BP  7. PAF -   Will start SQ Heparin. Resume Eliquis prior to dc. continue Amiodarone and keep on Telemetry. 8. HPL- resume Statin  9. Moderate MR - will need afterload when BP and Kidneys stable  10. Severe PA HTN RVSP 80 mmHg.         Electronically signed by Jose Luis Cormier MD on 9/2/2021 at 8:47 AM

## 2021-09-02 NOTE — PROGRESS NOTES
Nutrition Assessment    Type and Reason for Visit:  RD Nutrition Re-Screen/LOS    Nutrition Recommendations/Plan: Continue current plan of care    Nutrition Assessment:  Nutritional status appears adequate at this time, pt admitted with hyperkalemia, now resolved, did require emergent dialysis, but is  note expected to require long term. Appetite improving, Wide range of EMR weights and presence to 1-3+ edema make determinatin of wt loss difficult. Recommend continue current plan of care    Malnutrition Assessment:  Malnutrition Status:  No malnutrition    Context:  Chronic Illness       Wounds:  None       Current Nutrition Therapies:    ADULT DIET; Regular; Low Fat/Low Chol/High Fiber/2 gm Na;  Low Potassium (Less than 3000 mg/day)    Anthropometric Measures:  · Height: 5' 3\" (160 cm)  · Current Body Weight: 278 lb (126.1 kg) (* edema present)   · Admission Body Weight: 243 lb (110.2 kg)    · Usual Body Weight: 215 lb (97.5 kg) (( 2/21) & (6/21) office visits)     · Ideal Body Weight: 115 lbs;  · BMI: 49.3  · BMI Categories: Obese Class 3 (BMI 40.0 or greater)       Nutrition Diagnosis:   · No nutrition diagnosis at this time     Nutrition Interventions:   Food and/or Nutrient Delivery:  Continue Current Diet  Nutrition Education/Counseling:  Education not indicated     Goals:  po  > 75%, stable/improved renal labs       Nutrition Monitoring and Evaluation:   Behavioral-Environmental Outcomes:  None Identified   Food/Nutrient Intake Outcomes:  Food and Nutrient Intake  Physical Signs/Symptoms Outcomes:  Biochemical Data, Weight, Fluid Status or Edema     Discharge Planning:    No discharge needs at this time     Electronically signed by Dave Chaparro RD, LD on 9/2/21 at 2:13 PM EDT

## 2021-09-02 NOTE — PROGRESS NOTES
Palliative Care Progress Note  Patient: Melva Majano  Gender: female  YOB: 1940  Unit/Bed: C975/D803-74  Code Status: DNR-CCA  Inpatient Treatment Team: Treatment Team: Attending Provider: Maude Ennis DO; Consulting Physician: Igor Epperson MD; Consulting Physician: Radha Wilhelm MD; Utilization Reviewer: Tyrone Victoria RN; Utilization Reviewer: Lynnette Francisco RN; : Missy Rodriguez RN; Registered Nurse: Chaz Clark RN; LPN: Windy Chang LPN  Admit Date:  5/00/7281    Chief Complaint: Goals of Care    History of Presenting Illness:      Melva Majano is a [de-identified] y.o. female on hospital day 6 with a history of  CAD, PAF on Eliquis status post pacemaker, CKD, CHF, presented with SOB and increased edema. She had stopped taking furosemide on her own as she had started an antibiotic ( keflex) and was worried the combination of antibiotics and diuretics would harm her renal function, continued potassium supplementation. She was found to be hyperkalemic (8.5), fluid overload, and JOSIAH. Required hemodialysis due to elevated K despite multiple interventions. IV lasix. EF 30 % Edema improving. It does not appear she will need dialysis again at this time. Negative pain, SOB, GI or  complaints, appetite stable. No significant emotional, spiritual, or sleep disturbance. 9/1/21     Resting comfortably in bed, NAD, no SOB, chest pain, cough. BLE improved, but hypotension limits aggressive dialysis. Comfortable at rest, but any exertion makes her symptomatic. Attempting to participate in therapy, but limited by dizziness and fatigue. 9/2/21     Resting comfortably, dyspnea improved. Had Iron infusion yesterday    Review of Systems:       Review of Systems   Constitutional: Positive for fatigue. Negative for activity change, appetite change, chills, diaphoresis, fever and unexpected weight change.    HENT: Negative for drooling, hearing loss, mouth sores, sore throat, trouble swallowing and voice change. Eyes: Negative for discharge and visual disturbance. Respiratory: Negative for apnea, cough, choking, chest tightness, shortness of breath, wheezing and stridor. Cardiovascular: Negative for chest pain, palpitations and leg swelling. Gastrointestinal: Negative for abdominal distention, abdominal pain, anal bleeding, blood in stool, constipation, diarrhea, nausea, rectal pain and vomiting. Genitourinary: Negative for difficulty urinating, dysuria, enuresis, frequency and hematuria. Musculoskeletal: Negative for arthralgias, back pain, gait problem, joint swelling and myalgias. Skin: Negative for color change, pallor, rash and wound. Allergic/Immunologic: Negative for food allergies and immunocompromised state. Neurological: Positive for dizziness. Negative for tremors, seizures, syncope, facial asymmetry, speech difficulty, weakness, light-headedness, numbness and headaches. Hematological: Negative for adenopathy. Does not bruise/bleed easily. Psychiatric/Behavioral: Negative for agitation, behavioral problems, confusion, decreased concentration, dysphoric mood, hallucinations, self-injury, sleep disturbance and suicidal ideas. The patient is not nervous/anxious and is not hyperactive. Physical Examination:       BP (!) 102/41   Pulse 58   Temp 97.9 °F (36.6 °C) (Oral)   Resp 16   Ht 5' 3\" (1.6 m)   Wt 278 lb 14.1 oz (126.5 kg)   SpO2 98%   BMI 49.40 kg/m²    Physical Exam  Constitutional:       General: She is not in acute distress. Appearance: She is well-developed. She is not diaphoretic. HENT:      Head: Normocephalic and atraumatic. Right Ear: External ear normal.      Left Ear: External ear normal.      Nose: Nose normal.      Mouth/Throat:      Pharynx: No oropharyngeal exudate. Eyes:      General: No scleral icterus. Right eye: No discharge. Left eye: No discharge.       Conjunctiva/sclera: Conjunctivae normal. Pupils: Pupils are equal, round, and reactive to light. Neck:      Thyroid: No thyromegaly. Vascular: No JVD. Trachea: No tracheal deviation. Cardiovascular:      Rate and Rhythm: Normal rate and regular rhythm. Heart sounds: Normal heart sounds. Pulmonary:      Effort: Pulmonary effort is normal. No respiratory distress. Breath sounds: Normal breath sounds. No stridor. No wheezing or rales. Chest:      Chest wall: No tenderness. Abdominal:      General: Bowel sounds are normal. There is no distension. Palpations: Abdomen is soft. There is no mass. Tenderness: There is no abdominal tenderness. There is no guarding or rebound. Musculoskeletal:         General: No tenderness or deformity. Normal range of motion. Cervical back: Normal range of motion and neck supple. Lymphadenopathy:      Cervical: No cervical adenopathy. Skin:     General: Skin is warm and dry. Capillary Refill: Capillary refill takes less than 2 seconds. Findings: No erythema or rash. Neurological:      Mental Status: She is alert and oriented to person, place, and time. Psychiatric:         Behavior: Behavior normal.         Thought Content: Thought content normal.         Allergies:       Allergies   Allergen Reactions    Codeine      Other reaction(s): GI Upset  nausea & headaches         Medications:      Current Facility-Administered Medications   Medication Dose Route Frequency Provider Last Rate Last Admin    potassium chloride (KLOR-CON M) extended release tablet 20 mEq  20 mEq Oral BID  Joseluis Murdock MD        metOLazone (ZAROXOLYN) tablet 5 mg  5 mg Oral Daily Oz New Bescak, DO   5 mg at 09/02/21 0843    bumetanide (BUMEX) tablet 2 mg  2 mg Oral BID Jessica Gamma, DO   2 mg at 09/02/21 0843    iron sucrose (VENOFER) 200 mg in sodium chloride 0.9 % 100 mL IVPB  200 mg IntraVENous Q24H Masha Perkins MD   Stopped at 09/01/21 1826    midodrine (PROAMATINE) tablet 10 mg  10 mg Oral TID Christi Garcia MD   10 mg at 09/02/21 0843    heparin (porcine) injection 5,000 Units  5,000 Units SubCUTAneous 3 times per day Meagan Angel DO   5,000 Units at 09/02/21 0616    aspirin EC tablet 81 mg  81 mg Oral Daily Johnnie Lo MD   81 mg at 09/02/21 0843    atorvastatin (LIPITOR) tablet 40 mg  40 mg Oral Nightly Johnnie Lo MD   40 mg at 09/01/21 2253    epoetin makayla-epbx (RETACRIT) injection 10,000 Units  10,000 Units SubCUTAneous Q7 Days Rowan Michelle MD   10,000 Units at 08/29/21 2311    amiodarone (CORDARONE) tablet 200 mg  200 mg Oral Daily Johnnie Lo MD   200 mg at 09/02/21 0843    glucose (GLUTOSE) 40 % oral gel 15 g  15 g Oral PRN Rowan Michelle MD        dextrose 50 % IV solution  12.5 g IntraVENous PRN Rowan Michelle MD        glucagon (rDNA) injection 1 mg  1 mg IntraMUSCular PRN Rowan Michelle MD        dextrose 5 % solution  100 mL/hr IntraVENous PRN Rowan Michelle MD        heparin (porcine) injection 1,000 Units  1,000 Units IntraVENous PRN Rowan Michelle MD        albuterol (PROVENTIL) nebulizer solution 2.5 mg  2.5 mg Nebulization Q4H PRN Cinthia Boop, DO        sodium chloride flush 0.9 % injection 5-40 mL  5-40 mL IntraVENous 2 times per day Nicoletto Bolzan-Roche, APRN - CNP   10 mL at 09/02/21 0845    sodium chloride flush 0.9 % injection 5-40 mL  5-40 mL IntraVENous PRN Nicoletto Bolzan-Roche, APRN - CNP        0.9 % sodium chloride infusion  25 mL IntraVENous PRN Nicoletto Bolzan-Roche, APRN - CNP        ondansetron (ZOFRAN-ODT) disintegrating tablet 4 mg  4 mg Oral Q8H PRN Nicoletto Bolzan-Roche, APRN - CNP        Or    ondansetron (ZOFRAN) injection 4 mg  4 mg IntraVENous Q6H PRN Nicoletto Bolzan-Roche, APRN - CNP        acetaminophen (TYLENOL) tablet 650 mg  650 mg Oral Q6H PRN Nicoletto Bolzan-Roche, APRN - CNP        Or    acetaminophen (TYLENOL) suppository 650 mg  650 mg Rectal Q6H PRN MILEY Craig - CNP the Last Year:    Transportation Needs:     Lack of Transportation (Medical):  Lack of Transportation (Non-Medical):    Physical Activity:     Days of Exercise per Week:     Minutes of Exercise per Session:    Stress:     Feeling of Stress :    Social Connections:     Frequency of Communication with Friends and Family:     Frequency of Social Gatherings with Friends and Family:     Attends Catholic Services:     Active Member of Clubs or Organizations:     Attends Club or Organization Meetings:     Marital Status:    Intimate Partner Violence:     Fear of Current or Ex-Partner:     Emotionally Abused:     Physically Abused:     Sexually Abused:        Family Hx:  History reviewed. No pertinent family history.     LABS: Reviewed     CBC:  Lab Results   Component Value Date    WBC 3.2 09/02/2021    RBC 2.82 09/02/2021    HGB 8.5 09/02/2021    HCT 26.0 09/02/2021    MCV 92.1 09/02/2021    MCH 30.0 09/02/2021    MCHC 32.6 09/02/2021    RDW 15.8 09/02/2021     09/02/2021     CBC with Differential:   Lab Results   Component Value Date    WBC 3.2 09/02/2021    RBC 2.82 09/02/2021    HGB 8.5 09/02/2021    HCT 26.0 09/02/2021     09/02/2021    MCV 92.1 09/02/2021    MCH 30.0 09/02/2021    MCHC 32.6 09/02/2021    RDW 15.8 09/02/2021    LYMPHOPCT 26.0 09/02/2021    MONOPCT 8.7 09/02/2021    BASOPCT 0.6 09/02/2021    MONOSABS 0.3 09/02/2021    LYMPHSABS 0.8 09/02/2021    EOSABS 0.1 09/02/2021    BASOSABS 0.0 09/02/2021     CMP:    Lab Results   Component Value Date     09/02/2021    K 3.2 09/02/2021    CL 97 09/02/2021    CO2 29 09/02/2021    BUN 59 09/02/2021    CREATININE 2.29 09/02/2021    GFRAA 24.8 09/02/2021    LABGLOM 20.5 09/02/2021    GLUCOSE 78 09/02/2021    PROT 5.5 09/02/2021    LABALBU 2.5 09/02/2021    CALCIUM 8.1 09/02/2021    BILITOT 0.5 09/02/2021    ALKPHOS 45 09/02/2021    AST 12 09/02/2021    ALT <5 09/02/2021     BMP:    Lab Results   Component Value Date     09/02/2021    K 3.2 09/02/2021    CL 97 09/02/2021    CO2 29 09/02/2021    BUN 59 09/02/2021    LABALBU 2.5 09/02/2021    CREATININE 2.29 09/02/2021    CALCIUM 8.1 09/02/2021    GFRAA 24.8 09/02/2021    LABGLOM 20.5 09/02/2021    GLUCOSE 78 09/02/2021     TSH: No results found for: TSH  Vitamin B 12 and Folate: No components found for: FOLIC,  T12  Urinalysis: No results found for: Gisele Adames, [address], BACTERIA, RBCUA, BLOODU, SPECGRAV, GLUCOSEU        FUNCTIONAL ADL´S:     Independent: [ x ] Eating, [ x  ] Dressing, [  x ] Transferring, [ x  ] Toileting, [  x ] Bathing, [ x  ] Continence  Dependent   : [  ] Eating, [   ] Dressing, [   ] Transferring, [   ] Gisselle Jennifer, [   ] Bathing, [   ] Continence  W. assistant : [  ] Eating, [   ] Dressing, [   ] Transferring, [   ] Gisselle Jennifer, [   ] [place], [   ] Continence    Radiology: Reviewed      No results found. Assessment and plan:      -Advance Care Planning  Discussed goals of care with patient. Explained in extensive detail nuances between full code, DNR CCA and DNR CC. Patient has made the decision to be DNR CCA  Living will and MPOA in place      -Goals of Care Discussion:  Disease process and goals of treatment were discussed in basic terms. Her goal is to optimize available comfort care measures to decrease hospitalizations and maximize function. Albumin 2.5     BUN 59  Creatinine 2.29  Wbc 3.2  H&H 8.5 & 26.0  CHF with LVEF 30%  PPS 40%  Earnestine Speaker is hospice eligible should she stop aggressive treatment  Plan is SNF with Pall care to follow       We discussed the palliative care philosophy in light of those goals. We discussed all care options contingent on treatment response and QOL. Much active listening, presence, and emotional support were given. Thank you for allowing me to participate in her care    While hospitalized also treated for;  1. Hyperkalemia  2. JOSIAH on CKD  3. Heart Failure  4. PAF  5. CAD. 6. HTN  7.  HLD    Electronically signed by Av Chris, MILEY - CNP on 9/2/2021 at 10:40 AM

## 2021-09-02 NOTE — CARE COORDINATION
TEAM ROUNDS COMPLETED. PT PLAN TO D/C TO SNF. AWAITING FURTHER NEPHRO/IM PLAN FOR CONTINUED NEED OF DIALYSIS. NEED OF DIALYSIS WILL DETERMINE WHAT SNF PT GOES TO. SEE LSW NOTES FOR CHOICES. CARDIO CONSULTED.

## 2021-09-02 NOTE — PROGRESS NOTES
Department of Internal Medicine  General Internal Medicine  Attending Progress Note      SUBJECTIVE:  Pt seen and examined. No new symptoms.     OBJECTIVE      Medications    Current Facility-Administered Medications: potassium chloride (KLOR-CON M) extended release tablet 20 mEq, 20 mEq, Oral, BID WC  metOLazone (ZAROXOLYN) tablet 5 mg, 5 mg, Oral, Daily  bumetanide (BUMEX) tablet 2 mg, 2 mg, Oral, BID  iron sucrose (VENOFER) 200 mg in sodium chloride 0.9 % 100 mL IVPB, 200 mg, IntraVENous, Q24H  midodrine (PROAMATINE) tablet 10 mg, 10 mg, Oral, TID  heparin (porcine) injection 5,000 Units, 5,000 Units, SubCUTAneous, 3 times per day  aspirin EC tablet 81 mg, 81 mg, Oral, Daily  atorvastatin (LIPITOR) tablet 40 mg, 40 mg, Oral, Nightly  epoetin makayla-epbx (RETACRIT) injection 10,000 Units, 10,000 Units, SubCUTAneous, Q7 Days  amiodarone (CORDARONE) tablet 200 mg, 200 mg, Oral, Daily  glucose (GLUTOSE) 40 % oral gel 15 g, 15 g, Oral, PRN  dextrose 50 % IV solution, 12.5 g, IntraVENous, PRN  glucagon (rDNA) injection 1 mg, 1 mg, IntraMUSCular, PRN  dextrose 5 % solution, 100 mL/hr, IntraVENous, PRN  heparin (porcine) injection 1,000 Units, 1,000 Units, IntraVENous, PRN  albuterol (PROVENTIL) nebulizer solution 2.5 mg, 2.5 mg, Nebulization, Q4H PRN  sodium chloride flush 0.9 % injection 5-40 mL, 5-40 mL, IntraVENous, 2 times per day  sodium chloride flush 0.9 % injection 5-40 mL, 5-40 mL, IntraVENous, PRN  0.9 % sodium chloride infusion, 25 mL, IntraVENous, PRN  ondansetron (ZOFRAN-ODT) disintegrating tablet 4 mg, 4 mg, Oral, Q8H PRN **OR** ondansetron (ZOFRAN) injection 4 mg, 4 mg, IntraVENous, Q6H PRN  acetaminophen (TYLENOL) tablet 650 mg, 650 mg, Oral, Q6H PRN **OR** acetaminophen (TYLENOL) suppository 650 mg, 650 mg, Rectal, Q6H PRN  Physical    VITALS:  BP (!) 102/41   Pulse 58   Temp 97.9 °F (36.6 °C) (Oral)   Resp 16   Ht 5' 3\" (1.6 m)   Wt 278 lb 14.1 oz (126.5 kg)   SpO2 98%   BMI 49.40 kg/m² Constitutional: Awake and alert in no acute distress. Lying in bed comfortably  Head: Normocephalic, atraumatic  Eyes: EOMI, PERRLA  ENT: moist mucous membranes  Neck: neck supple, trachea midline, RIJ CVC in place  Lungs: Good inspiratory effort, no wheeze, no rhonchi, bibasilar crackles  Heart: RRR, normal S1 and S2  GI: Soft, non-distended, non tender, no guarding, no rebound, +BS  MSK: 2+ pitting edema noted bilateral LE's  Skin: warm, dry  Psych: appropriate affect     Data    CBC:   Lab Results   Component Value Date    WBC 3.2 09/02/2021    RBC 2.82 09/02/2021    HGB 8.5 09/02/2021    HCT 26.0 09/02/2021    MCV 92.1 09/02/2021    MCH 30.0 09/02/2021    MCHC 32.6 09/02/2021    RDW 15.8 09/02/2021     09/02/2021     CMP:    Lab Results   Component Value Date     09/02/2021    K 3.2 09/02/2021    CL 97 09/02/2021    CO2 29 09/02/2021    BUN 59 09/02/2021    CREATININE 2.29 09/02/2021    GFRAA 24.8 09/02/2021    LABGLOM 20.5 09/02/2021    GLUCOSE 78 09/02/2021    PROT 5.5 09/02/2021    LABALBU 2.5 09/02/2021    CALCIUM 8.1 09/02/2021    BILITOT 0.5 09/02/2021    ALKPHOS 45 09/02/2021    AST 12 09/02/2021    ALT <5 09/02/2021       ASSESSMENT AND PLAN      # Acute renal failure with associated volume overload and hyperkalemia  - hx of CKD3b  - HD cath placed on admission and urgent dialysis initiated- renal doesn't think HD is needed  - K initially 8.3 despite multiple interventions (insulin, kayexelate, lasix). Improved after dialysis  - nephrology consulted- IV lasix, lokelma  - renal US with L renal cyst  - O2 as needed - wean as tolerated  - PT/OT  - echo with EF 30%  - avoid nephrotoxic meds  - replace electrolytes as needed    # Hx of systolic CHF  - echo noted. Previous EF 35% and severe PAH  - sp AICD placement  - monitor on tele  - IV lasix and dialysis for fluid removal  - cardiology on consult-seen by Dr. Jodi Fong. Continue on IV diuresis    #Hypotension  -Possibly due to diuresis.   Started on midodrine by cardiologist to allow for continued diuresis. # PAF/CAD/HTN/HLD  - cont home meds, holding BP meds in setting of hypotension  - holding nephrotoxic meds    # GOC  - palliative consulted-CODE STATUS-DNR CCA  -Appreciate palliative care help    DVT: on Eliquis    Disposition: Will need to monitor renal function, volume status, and electrolytes closely to determine if further dialysis needed. Monitoring UOP. Nephrology and cardiology consulted. PT/OT. Will need SNF on discharge.       Destiny Montana, DO   Internal Medicine

## 2021-09-02 NOTE — CONSULTS
Physical Medicine & Rehabilitation  Consult Note      Admitting Physician: Destiny Montana DO    Primary Care Provider: Sanna Sanchez     Reason for Consult:  Asses rehab needs, promote physical and mental function, and decrease likelihood of re-admit to the hospital after discharge. History of Present Illness:    Trina Mcneal is a [de-identified] y.o. female admitted to [facility] on 8/27/2021. Admitted with acute on chronic kidney disease--CHF. Fatigue  This is a recurrent problem. The current episode started in the past 7 days. Associated symptoms include arthralgias, fatigue, myalgias and weakness. Pertinent negatives include no abdominal pain, chest pain, chills, coughing, fever, headaches, nausea, neck pain, numbness, rash or vomiting. I reviewed recent nursing notes discussed care with acute care providers, \" LSW spoke with patient and son, Kimberly Bajwa at bedside this afternoon. Patient would like to have her referral sent to [facility] [address] [facility] [employer] for transfer at discharge. LSW notified Isrrael/Liaison of this request. Awaiting acceptance at this time \". Their inpatient work up has included:    Imaging:    Imaging and other studies reviewed and discussed with patient and staff    Echocardiogram   8/29/2021  Transthoracic Echocardiography   Left Ventricle Left ventricular ejection fraction is visually estimated at 30%. Pseudonormal filling pattern noted. Right Ventricle Mildly enlarged right ventricle cavity. Left Atrium Moderately dilated left atrium. Right Atrium Moderately enlarged right atrium size. Mitral Valve MItral Leaflets are moderate Thick. 2+MR Tricuspid Valve Normal tricuspid valve structure and function. 3+ TR RVSP 80 mmHg Aortic Valve Aortic valve leaflets are moderately thickened. No AS Pulmonic Valve The pulmonic valve was not well visualized . Pericardial Effusion No evidence of pericardial effusion.  Pleural Effusion No evidence of pleural effusion. Aorta \ Miscellaneous The aorta is within normal limits. M-Mode Measurements (cm)   LVIDd: 5.83 cm                         LVIDs: 4.95 cm  IVSd: 1.31 cm                          IVSs: 1.92 cm  LVPWd: 1.64 cm                         LVPWs: 1.84 cm  Rt. Vent.  Dimension: 5.57 cm           AO Root Dimension: 3.45 cm                                         ACS: 1.89 cm                                         LVOT: 2.31 cm  Doppler Measurements:   AV Velocity:0.02 m/s                    MV Peak E-Wave: 1.18 m/s  AV Peak Gradient: 15.37 mmHg            MV Peak A-Wave: 0.78 m/s  AV Mean Gradient: 9.51 mmHg  AV Area (Continuity):1.97 cm^2  TR Velocity:4.21 m/s                    Estimated RAP:10 mmHg  TR Gradient:70.99 mmHg                  RVSP:80.99 mmHg  Valves  Mitral Valve   Peak E-Wave: 1.18 m/s                 Peak A-Wave: 0.78 m/s                                        E/A Ratio: 1.52                                        Peak Gradient: 5.59 mmHg  MR Velocity: 5.3 m/s                  Deceleration Time: 290.4 msec   Tissue Doppler   E' Septal Velocity: 0.07 m/s  E' Lateral Velocity: 0.08 m/s   Aortic Valve   Peak Velocity: 1.96 m/s                Mean Velocity: 1.48 m/s  Peak Gradient: 15.37 mmHg              Mean Gradient: 9.51 mmHg  Area (continuity): 1.97 cm^2  AV VTI: 42.8 cm   Cusp Separation: 1.89 cm   Tricuspid Valve   Estimated RVSP: 80.99 mmHg              Estimated RAP: 10 mmHg  TR Velocity: 4.21 m/s                   TR Gradient: 70.99 mmHg   Pulmonic Valve   Peak Velocity: 2 m/s           Peak Gradient: 15.97 mmHg                                 Estimated PASP: 80.99 mmHg   LVOT   Peak Velocity: 0.83 m/s              Mean Velocity: 0.62 m/s  Peak Gradient: 2.74 mmHg             Mean Gradient: 1.64 mmHg  LVOT Diameter: 2.31 cm               LVOT VTI: 20.16 cm  Structures  Left Atrium   LA Volume/Index: 205.67 ml /91 m^2            LA Area: 40.87 cm^2   Left Ventricle   Diastolic Dimension: 5.83 cm         Systolic Dimension: 7.61 cm  Septum Diastolic: 5.97 cm            Septum Systolic: 8.22 cm  PW Diastolic: 3.90 cm                PW Systolic: 1.66 cm                                       FS: 15.1 %  LV EDV/LV EDV Index: 168.21 ml/75    LV ESV/LV ESV Index: 115.66 ml/51 m^2  m^2                                  LV Length: 9.99 cm  EF Calculated: 31.2 %   LVOT Diameter: 2.31 cm   Right Atrium   RA Systolic Pressure: 10 mmHg   Right Ventricle   Diastolic Dimension: 8.47 cm                   RV Systolic Pressure: 46.84 mmHg  Aorta/ Miscellaneous Aorta   Aortic Root: 3.45 cm  LVOT Diameter: 2.31 cm      XR CHEST   8/28/2021 Left transvenous ICD. Bibasilar pleural-parenchymal changes, most likely combination of opacity and pleural effusion. No pneumothorax. Enlarged cardiac mediastinal silhouette, partially obscured by the lung findings. Aortic vascular calcifications with apparent aneurysmal dilation of the thoracic aorta measuring around 4.5 cm, but not well assessed on this study. No distinct acute osseous findings. Bibasilar pleural-parenchymal changes, most likely combination of opacity and pleural effusion. Aortic vascular calcifications with apparent aneurysmal dilation of the thoracic aorta measuring around 4.5 cm, but not well assessed on this study. Size may also be accentuated by AP technique. XR CHEST   8/28/2021: Patient rotated to left. Pacemaker generator and wires unchanged. Right jugular vein central line identified with tip in right atrium. Area of increased opacity obscures left diaphragm, left cardiac silhouette, and left mid and lower lung, unchanged. Blunting right costophrenic angle with ill-defined area of increased opacity right mid and right lower lung, unchanged. BILATERAL PLEURAL EFFUSIONS WITH BILATERAL ATELECTASIS/PNEUMONIA, UNCHANGED. US RETROPERITONEAL   8/28/2021  ACUTE KIDNEY INJURY FINDINGS: Right kidney measures 11.7 x 4.9 x 5.5 cm.  Left kidney measures 10.3 x 5.3 x 5.4 cm. Color flow without anomaly bilaterally. No calculi and no pelvocaliectasis bilaterally. No cortical thinning bilaterally. 5.6 x 6.7 x 4.7 cm cyst lower pole left kidney. LEFT RENAL CYST. Labs:     labs reviewed and discussed with patient and staff    Lab Results   Component Value Date    POCGLU 127 09/02/2021    POCGLU 84 09/02/2021    POCGLU 109 09/01/2021    POCGLU 106 09/01/2021    POCGLU 100 09/01/2021     Lab Results   Component Value Date     09/02/2021    K 3.2 09/02/2021    CL 97 09/02/2021    CO2 29 09/02/2021    BUN 59 09/02/2021    CREATININE 2.29 09/02/2021    CALCIUM 8.1 09/02/2021    LABALBU 2.5 09/02/2021    BILITOT 0.5 09/02/2021    ALKPHOS 45 09/02/2021    AST 12 09/02/2021    ALT <5 09/02/2021     Lab Results   Component Value Date    WBC 3.2 09/02/2021    RBC 2.82 09/02/2021    HGB 8.5 09/02/2021    HCT 26.0 09/02/2021    MCV 92.1 09/02/2021    MCH 30.0 09/02/2021    MCHC 32.6 09/02/2021    RDW 15.8 09/02/2021     09/02/2021     No results found for: VITD25  No results found for: VOL, APPEARANCE, COLORU, LABSPEC, LABPH, LEUKBLD, NITRU, GLUCOSEU, KETUA, UROBILINOGEN, KETUA, UROBILINOGEN, BILIRUBINUR, OCBU  No results found for: PROTIME  No results found for: INR      I discussed results with patient. Current Rehabilitation Assessments:    Rehabilitation:  Physical therapy: FIMS:  BedMobility:    Limited by low sat and dizziness  Transfers: Sit to Stand: 2 Person Assistance, Moderate Assistance  Stand to sit: 2 Person Assistance, Moderate Assistance, Ambulation 1  Surface: level tile  Device: Rolling Walker  Assistance: Moderate assistance, 2 Person assistance (+2 safety)  Quality of Gait: short BLE step length, slow allegra, FF posture.  downward gaze, WBOS, increased lateral sway  Distance: 6 steps forwards and backwards 2x with rest break  Comments: fatigued following,      FIMS: ,  , Assessment: once standing pt becomes nausea and attempts to Other reaction(s): GI Upset  nausea & headaches          CurrentMedications:     Current Facility-Administered Medications: potassium chloride (KLOR-CON M) extended release tablet 20 mEq, 20 mEq, Oral, BID WC  metOLazone (ZAROXOLYN) tablet 5 mg, 5 mg, Oral, Daily  bumetanide (BUMEX) tablet 2 mg, 2 mg, Oral, BID  iron sucrose (VENOFER) 200 mg in sodium chloride 0.9 % 100 mL IVPB, 200 mg, IntraVENous, Q24H  midodrine (PROAMATINE) tablet 10 mg, 10 mg, Oral, TID  heparin (porcine) injection 5,000 Units, 5,000 Units, SubCUTAneous, 3 times per day  aspirin EC tablet 81 mg, 81 mg, Oral, Daily  atorvastatin (LIPITOR) tablet 40 mg, 40 mg, Oral, Nightly  epoetin makayla-epbx (RETACRIT) injection 10,000 Units, 10,000 Units, SubCUTAneous, Q7 Days  amiodarone (CORDARONE) tablet 200 mg, 200 mg, Oral, Daily  glucose (GLUTOSE) 40 % oral gel 15 g, 15 g, Oral, PRN  dextrose 50 % IV solution, 12.5 g, IntraVENous, PRN  glucagon (rDNA) injection 1 mg, 1 mg, IntraMUSCular, PRN  dextrose 5 % solution, 100 mL/hr, IntraVENous, PRN  heparin (porcine) injection 1,000 Units, 1,000 Units, IntraVENous, PRN  albuterol (PROVENTIL) nebulizer solution 2.5 mg, 2.5 mg, Nebulization, Q4H PRN  sodium chloride flush 0.9 % injection 5-40 mL, 5-40 mL, IntraVENous, 2 times per day  sodium chloride flush 0.9 % injection 5-40 mL, 5-40 mL, IntraVENous, PRN  0.9 % sodium chloride infusion, 25 mL, IntraVENous, PRN  ondansetron (ZOFRAN-ODT) disintegrating tablet 4 mg, 4 mg, Oral, Q8H PRN **OR** ondansetron (ZOFRAN) injection 4 mg, 4 mg, IntraVENous, Q6H PRN  acetaminophen (TYLENOL) tablet 650 mg, 650 mg, Oral, Q6H PRN **OR** acetaminophen (TYLENOL) suppository 650 mg, 650 mg, Rectal, Q6H PRN      Social History:    Social History     Socioeconomic History    Marital status:       Spouse name: Not on file    Number of children: 2    Years of education: Not on file    Highest education level: Not on file   Occupational History    Not on file   Tobacco Use    Smoking status: Former Smoker     Quit date: 2016     Years since quittin.0    Smokeless tobacco: Never Used   Vaping Use    Vaping Use: Never used   Substance and Sexual Activity    Alcohol use: Yes     Comment: socially    Drug use: Never    Sexual activity: Not on file   Other Topics Concern    Not on file   Social History Narrative     Lives With: michelle Elder -works ft as a [employer] ; pt is home alone at times    Type of Home: Apartment (laundry in pt's [address] [id number] in [address]: One level    Bathroom Shower/Tub: [address]: [address] chair, Grab bars in shower, Grab bars around toilet    Home Equipment: Rolling walker, Lift chair    ADL Assistance: [address]: Needs assistance (michelle cooks; michelle shares laundry task)    Homemaking Responsibilities: Yes    Ambulation Assistance: [address] used at baseline)    Transfer Assistance: Independent    Active : Yes    Additional Comments: sleeping in a recliner    Retired  for [employer]  Strain:     Difficulty of Paying Living Expenses:    Food Insecurity:     Worried About [address] in the Last Year:    [address] in the Last Year:    Transportation Needs:     Lack of Transportation (Medical):      Lack of Transportation (Non-Medical):    Physical Activity:     Days of Exercise per Week:     Minutes of Exercise per Session:    Stress:     Feeling of Stress :    Social Connections:     Frequency of Communication with Friends and Family:     Frequency of Social Gatherings with Friends and Family:     Attends Bahai Services:     Active Member of Clubs or Organizations:     Attends Club or Organization Meetings:     Marital Status:    Intimate Partner Violence:     Fear of Current or Ex-Partner:     Emotionally Abused:     Physically Abused:     Sexually Abused:           Family History:     History reviewed. No pertinent family history. Review of Systems:    Review of Systems   Constitutional: Positive for activity change and fatigue. Negative for appetite change, chills, fever and unexpected weight change. HENT: Negative for ear discharge, ear pain, facial swelling, hearing loss and trouble swallowing. Eyes: Negative for photophobia, pain and redness. Respiratory: Negative for cough, choking, chest tightness, shortness of breath and wheezing. Cardiovascular: Negative for chest pain, palpitations and leg swelling. Gastrointestinal: Negative for abdominal distention, abdominal pain, anal bleeding, blood in stool, constipation, nausea and vomiting. Genitourinary: Negative for difficulty urinating, dysuria, flank pain, frequency and urgency. Musculoskeletal: Positive for arthralgias, gait problem and myalgias. Negative for neck pain and neck stiffness. Skin: Negative for color change, pallor, rash and wound. Neurological: Positive for weakness. Negative for dizziness, tremors, syncope, facial asymmetry, speech difficulty, light-headedness, numbness and headaches. Hematological: Negative for adenopathy. Does not bruise/bleed easily. Psychiatric/Behavioral: Positive for sleep disturbance. Negative for agitation, behavioral problems, confusion, decreased concentration, dysphoric mood, hallucinations, self-injury and suicidal ideas. The patient is not nervous/anxious and is not hyperactive. All other systems reviewed and are negative. Physical Exam:    BP (!) 102/41   Pulse 58   Temp 97.9 °F (36.6 °C) (Oral)   Resp 16   Ht 5' 3\" (1.6 m)   Wt 278 lb 14.1 oz (126.5 kg)   SpO2 98%   BMI 49.40 kg/m²      Physical Exam  Vitals reviewed. Constitutional:       General: She is not in acute distress. Appearance: She is well-developed. She is not ill-appearing, toxic-appearing or diaphoretic. Comments:         HENT:      Head: Normocephalic and atraumatic. Right Ear: Hearing normal.      Left Ear: Hearing normal.      Nose: Nose normal.      Mouth/Throat:      Mouth: No oral lesions. Dentition: Normal dentition. Pharynx: No oropharyngeal exudate. Eyes:      General: No scleral icterus. Right eye: No discharge. Left eye: No discharge. Conjunctiva/sclera: Conjunctivae normal.      Right eye: No chemosis or exudate. Left eye: No chemosis or exudate. Neck:      Thyroid: No thyromegaly. Vascular: No JVD. Trachea: No tracheal deviation. Pulmonary:      Effort: Pulmonary effort is normal. No tachypnea, bradypnea, accessory muscle usage or respiratory distress. Breath sounds: No wheezing. Chest:      Chest wall: No tenderness. Musculoskeletal:         General: Tenderness present. Right shoulder: Normal.      Left shoulder: Normal.      Right upper arm: Normal.      Left upper arm: Normal.      Right elbow: Normal.      Left elbow: Normal.      Right forearm: Normal.      Left forearm: Normal.      Right wrist: Normal.      Left wrist: Normal.      Right hand: Normal.      Left hand: Normal.      Cervical back: Normal range of motion and neck supple. No edema or rigidity. Thoracic back: Normal.      Lumbar back: Tenderness and bony tenderness present. No swelling, edema, deformity or lacerations. Decreased range of motion. Right hip: Normal.      Left hip: Normal.      Right upper leg: Normal.      Left upper leg: Normal.      Right knee: Normal.      Left knee: Normal.      Right lower leg: Normal.      Left lower leg: Normal.      Right ankle: Normal.      Right Achilles Tendon: Normal.      Left ankle: Normal.      Left Achilles Tendon: Normal.      Right foot: Normal.      Left foot: Normal.      Comments: Tender areas are indicated by numbered spot         Skin:     General: Skin is warm and dry. Coloration: Skin is not pale. Findings: No abrasion, bruising, ecchymosis, erythema, laceration, petechiae or rash. Rash is not macular, pustular or urticarial.      Nails: There is no clubbing. Neurological:      Mental Status: She is alert and oriented to person, place, and time. Cranial Nerves: No cranial nerve deficit. Sensory: No sensory deficit. Coordination: Coordination normal.      Gait: Gait normal.   Psychiatric:         Attention and Perception: She is attentive. Mood and Affect: Mood is not anxious or depressed. Affect is not labile, blunt, angry or inappropriate. Speech: She is communicative. Speech is not rapid and pressured, delayed, slurred or tangential.         Behavior: Behavior normal. Behavior is not agitated, slowed, aggressive, withdrawn, hyperactive or combative. Thought Content: Thought content normal. Thought content is not paranoid or delusional. Thought content does not include homicidal or suicidal ideation. Thought content does not include homicidal or suicidal plan. Cognition and Memory: Memory is not impaired. She does not exhibit impaired recent memory or impaired remote memory. Judgment: Judgment normal. Judgment is not impulsive or inappropriate. Ortho Exam  Neurologic Exam     Mental Status   Oriented to person, place, and time.    Speech: not slurred             Diagnostics:    Recent Results (from the past 24 hour(s))   POCT Glucose    Collection Time: 09/01/21  4:56 PM   Result Value Ref Range    POC Glucose 109 60 - 115 mg/dl    Performed on ACCU-CHEK    Basic Metabolic Panel    Collection Time: 09/01/21  7:37 PM   Result Value Ref Range    Sodium 133 (L) 135 - 144 mEq/L    Potassium 3.5 3.4 - 4.9 mEq/L    Chloride 95 95 - 107 mEq/L    CO2 32 (H) 20 - 31 mEq/L    Anion Gap 6 (L) 9 - 15 mEq/L    Glucose 115 (H) 70 - 99 mg/dL    BUN 57 (H) 8 - 23 mg/dL    CREATININE 2.44 (H) 0.50 - 0.90 mg/dL    GFR Non-African American 19.0 (L) >60    GFR  23.0 (L) >60    Calcium 8.0 (L) 8.5 - 9.9 mg/dL   CBC auto differential    Collection Time: 09/02/21  6:55 AM   Result Value Ref Range    WBC 3.2 (L) 4.8 - 10.8 K/uL    RBC 2.82 (L) 4.20 - 5.40 M/uL    Hemoglobin 8.5 (L) 12.0 - 16.0 g/dL    Hematocrit 26.0 (L) 37.0 - 47.0 %    MCV 92.1 82.0 - 100.0 fL    MCH 30.0 27.0 - 31.3 pg    MCHC 32.6 (L) 33.0 - 37.0 %    RDW 15.8 (H) 11.5 - 14.5 %    Platelets 048 (L) 389 - 400 K/uL    PLATELET SLIDE REVIEW Decreased     Neutrophils % 63.0 %    Lymphocytes % 26.0 %    Monocytes % 8.7 %    Eosinophils % 2.0 %    Basophils % 0.6 %    Neutrophils Absolute 2.0 1.4 - 6.5 K/uL    Lymphocytes Absolute 0.8 (L) 1.0 - 4.8 K/uL    Monocytes Absolute 0.3 0.2 - 0.8 K/uL    Eosinophils Absolute 0.1 0.0 - 0.7 K/uL    Basophils Absolute 0.0 0.0 - 0.2 K/uL    Anisocytosis 1+     Macrocytes 1+     Hypochromia 2+     Poikilocytes 1+     Target Cells 2+    Comprehensive Metabolic Panel    Collection Time: 09/02/21  6:55 AM   Result Value Ref Range    Sodium 133 (L) 135 - 144 mEq/L    Potassium 3.2 (L) 3.4 - 4.9 mEq/L    Chloride 97 95 - 107 mEq/L    CO2 29 20 - 31 mEq/L    Anion Gap 7 (L) 9 - 15 mEq/L    Glucose 78 70 - 99 mg/dL    BUN 59 (H) 8 - 23 mg/dL    CREATININE 2.29 (H) 0.50 - 0.90 mg/dL    GFR Non-African American 20.5 (L) >60    GFR  24.8 (L) >60    Calcium 8.1 (L) 8.5 - 9.9 mg/dL    Total Protein 5.5 (L) 6.3 - 8.0 g/dL    Albumin 2.5 (L) 3.5 - 4.6 g/dL    Total Bilirubin 0.5 0.2 - 0.7 mg/dL    Alkaline Phosphatase 45 40 - 130 U/L    ALT <5 0 - 33 U/L    AST 12 0 - 35 U/L    Globulin 3.0 2.3 - 3.5 g/dL   Magnesium    Collection Time: 09/02/21  6:55 AM   Result Value Ref Range    Magnesium 1.8 1.7 - 2.4 mg/dL   POCT Glucose    Collection Time: 09/02/21  7:58 AM   Result Value Ref Range    POC Glucose 84 60 - 115 mg/dl    Performed on ACCU-CHEK    POCT Glucose    Collection Time: 09/02/21 11:52 AM   Result Value Ref Range    POC Glucose 127 (H) 60 - 115

## 2021-09-02 NOTE — PROGRESS NOTES
Nephrology Progress Note    Assessment:  CKD-4 with josiah  HFrEF  OHDx CAD/PAF  Hx Hypertension asymptomatic  With BP  Obesity  weakness      Plan: continue present  Program consider [facility] cardiac rEF    Patient Active Problem List:     Hyperkalemia     JOSIAH on CKD     Acute congestive heart failure (HCC)     PAF      CAD     HTN     HLD      Subjective:  Admit Date: 8/27/2021    Interval History: rehab consult  Weakness significant not walking well    Medications:  Scheduled Meds:   lactulose  20 g Oral Once    metOLazone  5 mg Oral Daily    bumetanide  2 mg Oral BID    iron sucrose  200 mg IntraVENous Q24H    midodrine  10 mg Oral TID    heparin (porcine)  5,000 Units SubCUTAneous 3 times per day    aspirin  81 mg Oral Daily    atorvastatin  40 mg Oral Nightly    epoetin makayla-epbx  10,000 Units SubCUTAneous Q7 Days    amiodarone  200 mg Oral Daily    sodium chloride flush  5-40 mL IntraVENous 2 times per day     Continuous Infusions:   dextrose      sodium chloride         CBC:   Recent Labs     08/31/21  0649 09/01/21  0654   WBC 4.6* 4.0*   HGB 8.4* 8.9*    128*     CMP:    Recent Labs     08/31/21  1916 09/01/21  0654 09/01/21  1937   * 129* 133*   K 3.9 3.5 3.5   CL 99 95 95   CO2 27 27 32*   BUN 56* 58* 57*   CREATININE 2.38* 2.50* 2.44*   GLUCOSE 113* 98 115*   CALCIUM 8.1* 7.9* 8.0*   LABGLOM 19.6* 18.5* 19.0*     Troponin: No results for input(s): TROPONINI in the last 72 hours. BNP: No results for input(s): BNP in the last 72 hours. INR: No results for input(s): INR in the last 72 hours. Lipids: No results for input(s): CHOL, LDLDIRECT, TRIG, HDL, AMYLASE, LIPASE in the last 72 hours. Liver:   Recent Labs     09/01/21  0654   AST 13   ALT <5   ALKPHOS 45   PROT 5.5*   LABALBU 2.4*   BILITOT 0.5     Iron:  No results for input(s): IRONS, FERRITIN in the last 72 hours.     Invalid input(s): LABIRONS  Urinalysis: No results for input(s): UA in the last 72 hours.    Objective:  Vitals: BP (!) 100/39   Pulse 60   Temp 97.7 °F (36.5 °C) (Oral)   Resp 16   Ht 5' 3\" (1.6 m)   Wt 278 lb 14.1 oz (126.5 kg)   SpO2 100%   BMI 49.40 kg/m²    Wt Readings from Last 3 Encounters:   09/01/21 278 lb 14.1 oz (126.5 kg)      24HR INTAKE/OUTPUT:      Intake/Output Summary (Last 24 hours) at 9/2/2021 0815  Last data filed at 9/2/2021 0500  Gross per 24 hour   Intake 720 ml   Output 2100 ml   Net -1380 ml       General: alert, in no apparent distress  HEENT: normocephalic, atraumatic, anicteric  Neck: supple, no mass  Lungs: non-labored respirations, clear to auscultation bilaterally  Heart: regular rate and rhythm, no murmurs or rubs  Abdomen: soft, non-tender, non-distended obese  Ext: no cyanosis, 1+peripheral edema  Neuro: alert and oriented, no gross abnormalities  Psych: normal mood and affect  Skin: no rash      Electronically signed by Alek Harris DO, MD

## 2021-09-02 NOTE — PROGRESS NOTES
Physical Therapy Med Surg Daily Treatment Note  Facility/Department: [facility]  [facility]       NAME: Lupis Fontenot  : 1940 ([de-identified] y.o.)  MRN: [id number]  CODE STATUS: DNR-CCA    Date of Service: 2021    Patient Diagnosis(es): Hyperkalemia [E87.5]  Acute renal failure, unspecified acute renal failure type ([facility] [N17.9]  Acute congestive heart failure, unspecified heart failure type [facility]) [I50.9]   Chief Complaint   Patient presents with    Shortness of Breath     Patient Active Problem List    Diagnosis Date Noted    Endometrial cancer ([facility] 2021    Gait abnormality 2021    Hyperkalemia 2021    JOSIAH on CKD 2021    Acute congestive heart failure ([facility] 2021    PAF  2021    CAD 2021    HTN 2021    HLD 2021    Pulmonary HTN ([facility] 2021    Dry eye syndrome, bilateral 2020    CKD (chronic kidney disease) stage 4, GFR 15-29 ml/min ([facility]) 2019    Chronic systolic heart failure ([facility] 2018    Secondary osteoarthritis of multiple sites 2017    Vitamin D deficiency 2017    Carotid artery stenosis 2017    Ischemic cardiomyopathy 2017    BMI 40.0-44.9, adult ([facility] 2017    Stented coronary artery 2017    Cervicalgia 2007    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        Past Medical History:   Diagnosis Date    Atrial fibrillation (HCC)     CAD (coronary artery disease)     Cancer (HCC)     CHF (congestive heart failure) ([facility])     Hyperlipidemia     Hypertension     Obesity      Past Surgical History:   Procedure Laterality Date    CARDIAC CATHETERIZATION      CAROTID ENDARTERECTOMY Left     CHOLECYSTECTOMY      HYSTERECTOMY, TOTAL ABDOMINAL      PACEMAKER INSERTION Left     TONSILLECTOMY         Restrictions  Restrictions/Precautions: Fall Risk (mod balderas score)    SUBJECTIVE   General  Chart Reviewed: Yes  Family / Caregiver Present: No  Subjective  Subjective: i just get dizzy when i move around  General Comment  Comments: Sp02 88% prior . VC from breathing interventions    Pre-Session Pain Report     Pain Screening  Patient Currently in Pain: No  Pre Treatment Pain Screening  Pain at present: 0  Scale Used: Numeric Score  Intervention List: Patient able to continue with treatment    Post-Session Pain Report  Pain Assessment  Pain Assessment: 0-10  Pain Level: 0         OBJECTIVE        Bed mobility  Rolling to Left: Minimal assistance  Rolling to Right: Minimal assistance  Supine to Sit: Moderate assistance  Sit to Supine: Moderate assistance (assistance with BLE into bed)    Transfers  Sit to Stand: 2 Person Assistance; Moderate Assistance  Stand to sit: 2 Person Assistance; Moderate Assistance  Comment: VC for hand placement. VC for anterior weight shifting. assistance coming to full stand and VC to tuck in bottom and stand tall. Ambulation  Ambulation?: Yes  More Ambulation?: No  Ambulation 1  Surface: level tile  Device: Rolling Walker  Assistance: Moderate assistance;2 Person assistance (+2 safety)  Quality of Gait: short BLE step length, slow allegra, FF posture. downward gaze, WBOS, increased lateral sway  Distance: 6 steps forwards and backwards 2x with rest break  Comments: fatigued following                    Exercises  Knee Long Arc Quad: x10  Ankle Pumps: x10  Other exercises  Other exercises?: Yes  Other exercises 1: static standing x2 min  Other exercises 2: side stepping to HOB 4 steps at [employer]  Other exercises 3: rolling to either side in bed 2x                    Activity Tolerance  Activity Tolerance: Patient Tolerated treatment well  Activity Tolerance: pt limited by nausea and dizziness. ASSESSMENT     Pt demonstrated ability to tolerate short distance ambulation this session with fair balance. Pt was fatigued post and required a seated rest break.  Pt was also able to tolerate static standing at [employer] for 2 mins with minimal c/o fatigue. Pt was limited by nausea and dizziness as well as decreased endurance and increased fatigue this session, but pt is progressing towards her goals. Discharge Recommendations:  Continue to assess pending progress    Goals  Short term goals  Short term goal 1: Pt will demonstrate HEP indep  Long term goals  Long term goal 1: Pt will demonstrate transfers mod indep with safest AD  Long term goal 2: Pt will demonstrate amb 100ft mod indep with safest AD  Long term goal 3: Pt will demonstrate standing tolerance 5 min without UE support    PLAN    Times per week: 3-6  Safety Devices  Type of devices: All fall risk precautions in place, Bed alarm in place, Call light within reach, Left in bed     AMPAC (6 CLICK) BASIC MOBILITY  AM-PAC Inpatient Mobility Raw Score : 11     Therapy Time   Individual   Time In 1037   Time Out 1100   Minutes 23      gait:10  Bm/trans:10  thereex:3       Mcdowell Poles, PTA, 09/02/21 at 11:08 AM         Definitions for assistance levels  Independent = pt does not require any physical supervision or assistance from another person for activity completion. Device may be needed.   Stand by assistance = pt requires verbal cues or instructions from another person, close to but not touching, to perform the activity  Minimal assistance= pt performs 75% or more of the activity; assistance is required to complete the activity  Moderate assistance= pt performs 50% of the activity; assistance is required to complete the activity  Maximal assistance = pt performs 25% of the activity; assistance is required to complete the activity  Dependent = pt requires total physical assistance to accomplish the task

## 2021-09-03 NOTE — FLOWSHEET NOTE
Pt awake in bed watching TV. Accepted HS meds without problem. 5LNC in use. Respirations even and nonlabored. Au catheter patent and draining yellow urine. VSS. Tele AV paced. POC glucose 103. Call light in reach.

## 2021-09-03 NOTE — CARE COORDINATION
LSW notified Leonard Gamboa/Liaison at [facility] that patient is awaiting medical stability before coming to SNF.   Electronically signed by ESTELA Kathleen, SIMONW on 9/3/21 at 3:56 PM EDT

## 2021-09-03 NOTE — PROGRESS NOTES
Subjective: The patient complains of severe acute on chronic progressive fatigue and pain chronic kidney disease partially relieved by rest, PT, OT and meds and exacerbated by exertion and recent illness. I am concerned about patients medical complexities-renal disease. He is balancing her kidney disease with need for diuresis and IV Lasix. Reviewed recent nursing note and discussed current status and planned care with acute care providers, \" Pt resting in bed, no distress noted. PCA at bedside. Call light in reach. \".    ROS x10: The patient also complains of severely impaired mobility and activities of daily living. Otherwise no new problems with vision, hearing, nose, mouth, throat, dermal, cardiovascular, GI, , pulmonary, musculoskeletal, psychiatric or neurological. See Rehab consult on Rehab chart . Vital signs:  BP (!) 110/45   Pulse 77   Temp 97.5 °F (36.4 °C)   Resp 16   Ht 5' 3\" (1.6 m)   Wt 269 lb 2.9 oz (122.1 kg)   SpO2 95%   BMI 47.68 kg/m²   I/O:   PO/Intake:    fair PO intake,       Bowel/Bladder:   continent,    General:  Patient is well developed, adequately nourished, non-obese and     well kempt. HEENT:    PERRLA, hearing intact to loud voice, external inspection of ear     and nose benign. Inspection of lips, tongue and gums benign  Musculoskeletal: No significant change in strength or tone. All joints stable. Inspection and palpation of digits and nails show no clubbing,       cyanosis or inflammatory conditions. Neuro/Psychiatric: Affect: flat-  Alert and oriented to self and     Situation with  min cues. No significant change in deep tendon reflexes or     sensation  Lungs:  Diminished, CTA-B. Respiration effort is normal at rest.  COOPER  Heart:   S1 = S2,   RRR. No loud murmurs. Abdomen:  Soft, non-tender, no enlargement of liver or spleen. Extremities:  No significant lower extremity edema or tenderness.   Skin:    BUE bruises dt blood draws, no visualized or palpated problems. Rehabilitation:  Physical therapy: FIMS:  Bed Mobility:      Transfers: Sit to Stand: 2 Person Assistance, Moderate Assistance  Stand to sit: 2 Person Assistance, Moderate Assistance, Ambulation 1  Surface: level tile  Device: Rolling Walker  Assistance: Moderate assistance, 2 Person assistance (+2 safety)  Quality of Gait: short BLE step length, slow allegra, FF posture. downward gaze, WBOS, increased lateral sway  Distance: 6 steps forwards and backwards 2x with rest break  Comments: fatigued following,      FIMS:  ,  , Assessment: once standing pt becomes nausea and attempts to vomit but does not. pt c/o dizziness, BP WNL. tx limited by dizziness and nausea. Pt decreased SOB this date.     Occupational therapy: FIMS:   ,  ,      Speech therapy: FIMS:      SPEECH THERAPY               Diet/Swallow:                           COGNITION  OT: Cognition Comment: follows one step commands  SP:           Lab/X-ray studies reviewed, analyzed and discussed with patient and staff:   Recent Results (from the past 24 hour(s))   POCT Glucose    Collection Time: 09/02/21 11:52 AM   Result Value Ref Range    POC Glucose 127 (H) 60 - 115 mg/dl    Performed on ACCU-CHEK    POCT Glucose    Collection Time: 09/02/21  4:55 PM   Result Value Ref Range    POC Glucose 107 60 - 115 mg/dl    Performed on ACCU-CHEK    POCT Glucose    Collection Time: 09/02/21  8:04 PM   Result Value Ref Range    POC Glucose 108 60 - 115 mg/dl    Performed on ACCU-CHEK    Basic Metabolic Panel    Collection Time: 09/02/21  8:25 PM   Result Value Ref Range    Sodium 135 135 - 144 mEq/L    Potassium 3.4 3.4 - 4.9 mEq/L    Chloride 96 95 - 107 mEq/L    CO2 31 20 - 31 mEq/L    Anion Gap 8 (L) 9 - 15 mEq/L    Glucose 103 (H) 70 - 99 mg/dL    BUN 57 (H) 8 - 23 mg/dL    CREATININE 2.24 (H) 0.50 - 0.90 mg/dL    GFR Non-African American 21.0 (L) >60    GFR  25.4 (L) >60    Calcium 8.5 8.5 - 9.9 mg/dL   CBC auto differential    Collection Time: 09/03/21  7:22 AM   Result Value Ref Range    WBC 3.1 (L) 4.8 - 10.8 K/uL    RBC 2.81 (L) 4.20 - 5.40 M/uL    Hemoglobin 8.3 (L) 12.0 - 16.0 g/dL    Hematocrit 25.7 (L) 37.0 - 47.0 %    MCV 91.4 82.0 - 100.0 fL    MCH 29.7 27.0 - 31.3 pg    MCHC 32.5 (L) 33.0 - 37.0 %    RDW 15.9 (H) 11.5 - 14.5 %    Platelets 412 (L) 815 - 400 K/uL    Neutrophils % 57.4 %    Lymphocytes % 24.6 %    Monocytes % 14.8 %    Eosinophils % 2.4 %    Basophils % 0.8 %    Neutrophils Absolute 1.8 1.4 - 6.5 K/uL    Lymphocytes Absolute 0.8 (L) 1.0 - 4.8 K/uL    Monocytes Absolute 0.5 0.2 - 0.8 K/uL    Eosinophils Absolute 0.1 0.0 - 0.7 K/uL    Basophils Absolute 0.0 0.0 - 0.2 K/uL   Comprehensive Metabolic Panel    Collection Time: 09/03/21  7:22 AM   Result Value Ref Range    Sodium 135 135 - 144 mEq/L    Potassium 3.4 3.4 - 4.9 mEq/L    Chloride 97 95 - 107 mEq/L    CO2 31 20 - 31 mEq/L    Anion Gap 7 (L) 9 - 15 mEq/L    Glucose 78 70 - 99 mg/dL    BUN 57 (H) 8 - 23 mg/dL    CREATININE 2.27 (H) 0.50 - 0.90 mg/dL    GFR Non-African American 20.7 (L) >60    GFR  25.0 (L) >60    Calcium 8.5 8.5 - 9.9 mg/dL    Total Protein 5.4 (L) 6.3 - 8.0 g/dL    Albumin 2.5 (L) 3.5 - 4.6 g/dL    Total Bilirubin 0.5 0.2 - 0.7 mg/dL    Alkaline Phosphatase 46 40 - 130 U/L    ALT 6 0 - 33 U/L    AST 14 0 - 35 U/L    Globulin 2.9 2.3 - 3.5 g/dL   Magnesium    Collection Time: 09/03/21  7:22 AM   Result Value Ref Range    Magnesium 1.7 1.7 - 2.4 mg/dL   POCT Glucose    Collection Time: 09/03/21  7:59 AM   Result Value Ref Range    POC Glucose 88 60 - 115 mg/dl    Performed on ACCU-CHEK        Previous extensive, complex labs, notes and diagnostics reviewed and analyzed. ALLERGIES:    Allergies as of 08/27/2021 - never reviewed   Allergen Reaction Noted    Codeine  06/25/2001      (please also verify by checking [facility])     Complex Physical Medicine & Rehab Issues Assess & Plan:   1.  Severe abnormality of gait and mobility and impaired self-care and ADL's secondary to progressive cardiac debility with renal failure. Functional and medical status reassessed regarding patients ability to participate in therapies and patient found to be able to participate in skilled vs sub-acute intensive comprehensive inpatient rehabilitation program including PT/OT to improve balance, ambulation, ADLs, and to improve the P/AROM. It is my opinion that they will be able to tolerate 3 hours of therapy a day and benefit from it at an acute level. 2. Bowel constipation and Bladder dysfunction overactive bladder:  frequent toileting, ambulate to bathroom with assistance, check post void residuals. Check for C.difficile x1 if >2 loose stools in 24 hours, continue bowel & bladder program.  Monitor for UTI symptoms including lethargy and confusion  3. Mild to moderate lbp and generalized OA pain: reassess pain every shift and prior to and after each therapy session, give prn  Tylenol, modalities prn in therapy, consider Lidoderm, K-pad prn.   4. Skin breakdown risk:  continue pressure relief program.  Daily skin exams and reports from nursing. 5. Severe fatigue due to immobility and nutritional deficits: Add vitamin B12 vitamin D and CoQ10 titrate dosing and add protein supplementation with low carb content. 6. Complex discharge planning:   Current plan which looks to be adequate to meet patient's rehab needs is skilled rehab at the [facility]. Complex Active General Medical Issues that complicate care Assess & Plan:     1. Active Problems:    Hyperkalemia    JOSIAH on CKD    Acute congestive heart failure (HCC)    PAF     CAD    HTN    HLD    Cervicalgia    Ischemic cardiomyopathy    Gait abnormality  Resolved Problems:    * No resolved hospital problems.  Ilsa Lundborg, D.O., PM&R     Attending    [facility] Yanci Hernandez

## 2021-09-03 NOTE — PROGRESS NOTES
Nephrology Progress Note    Assessment:  CKD-4  OHDX CAD AICD  HFrEF  Hx uterine cancer  Vertigo  Constipation      Plan:antivert  Lactulose with Ducolax today  await Rehab decision    Patient Active Problem List:     Hyperkalemia     JOSIAH on CKD     Acute congestive heart failure (HCC)     PAF      CAD     HTN     HLD     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HCC)     Carotid artery stenosis     Cervicalgia     Chronic systolic heart failure (HCC)     CKD (chronic kidney disease) stage 4, GFR 15-29 ml/min (HCC)     Dry eye syndrome, bilateral     Endometrial cancer (HCC)     Ischemic cardiomyopathy     BMI 40.0-44.9, adult (HCC)     Pulmonary HTN (HCC)     Secondary osteoarthritis of multiple sites     Stented coronary artery     Vitamin D deficiency     Gait abnormality      Subjective:  Admit Date: 8/27/2021    Interval History: dizziness with turning  No BM yet    Medications:  Scheduled Meds:   magnesium oxide  800 mg Oral Daily    bisacodyl  10 mg Oral Once    lactulose  30 g Oral Once    meclizine  12.5 mg Oral BID    potassium chloride  20 mEq Oral BID WC    metOLazone  5 mg Oral Daily    bumetanide  2 mg Oral BID    iron sucrose  200 mg IntraVENous Q24H    midodrine  10 mg Oral TID    heparin (porcine)  5,000 Units SubCUTAneous 3 times per day    aspirin  81 mg Oral Daily    atorvastatin  40 mg Oral Nightly    epoetin makayal-epbx  10,000 Units SubCUTAneous Q7 Days    amiodarone  200 mg Oral Daily    sodium chloride flush  5-40 mL IntraVENous 2 times per day     Continuous Infusions:   dextrose      sodium chloride         CBC:   Recent Labs     09/02/21  0655 09/03/21  0722   WBC 3.2* 3.1*   HGB 8.5* 8.3*   * 126*     CMP:    Recent Labs     09/01/21 1937 09/01/21 1937 09/02/21  0655 09/02/21 2025 09/03/21  0722   *   < > 133* 135 135   K 3.5   < > 3.2* 3.4 3.4   CL 95   < > 97 96 97   CO2 32*   < > 29 31 31   BUN 57*   < > 59* 57* 57*   CREATININE 2.44*   < > 2.29* 2.24* 2.27*   GLUCOSE

## 2021-09-03 NOTE — PROGRESS NOTES
Progress Note  Patient: Marty Warner  Unit/Bed: B229/A686-87  YOB: 1940  MRN: [id number]  Acct: [de-identified]   Admitting Diagnosis: Hyperkalemia [E87.5]  Acute renal failure, unspecified acute renal failure type [facility]) [N17.9]  Acute congestive heart failure, unspecified heart failure type [facility]) [I50.9]  Admit Date:  2021  Hospital Day: 7    Chief Complaint: JOSIAH HyperK CHF    Histories:  Past Medical History:   Diagnosis Date    Atrial fibrillation ([facility]     CAD (coronary artery disease)     Cancer ([facility]     CHF (congestive heart failure) ([facility]     Hyperlipidemia     Hypertension     Obesity      Past Surgical History:   Procedure Laterality Date    CARDIAC CATHETERIZATION      CAROTID ENDARTERECTOMY Left     CHOLECYSTECTOMY      HYSTERECTOMY, TOTAL ABDOMINAL      PACEMAKER INSERTION Left     TONSILLECTOMY       History reviewed. No pertinent family history. Social History     Socioeconomic History    Marital status:       Spouse name: None    Number of children: 2    Years of education: None    Highest education level: None   Occupational History    None   Tobacco Use    Smoking status: Former Smoker     Quit date: 2016     Years since quittin.0    Smokeless tobacco: Never Used   Vaping Use    Vaping Use: Never used   Substance and Sexual Activity    Alcohol use: Yes     Comment: socially    Drug use: Never    Sexual activity: None   Other Topics Concern    None   Social History Narrative     Lives With: michelle Keys -works ft as a [employer] ; pt is home alone at times    Type of Home: Apartment (laundry in pt's [id number] in [address]: One level    Bathroom Shower/Tub: Walk-in shower    [employer]: [address] chair, Grab bars in shower, Grab bars around toilet    Home Equipment: Rolling walker, Lift chair    ADL Assistance: Independent    Homemaking Assistance: Needs assistance (grandson cooks; michelle shares laundry task)    Homemaking Responsibilities: Yes    Ambulation Assistance: Independent [employer] used at baseline)    Transfer Assistance: Independent    Active : Yes    Additional Comments: sleeping in a recliner    Retired  for [address] Strain:     Difficulty of Paying Living Expenses:    Food Insecurity:     Worried About [address] in the Last Year:    [address] in the Last Year:    Transportation Needs:     Lack of Transportation (Medical):  Lack of Transportation (Non-Medical):    Physical Activity:     Days of Exercise per Week:     Minutes of Exercise per Session:    Stress:     Feeling of Stress :    Social Connections:     Frequency of Communication with Friends and Family:     Frequency of Social Gatherings with Friends and Family:     Attends Advent Services:     Active Member of Clubs or Organizations:     Attends Club or Organization Meetings:     Marital Status:    Intimate Partner Violence:     Fear of Current or Ex-Partner:     Emotionally Abused:     Physically Abused:     Sexually Abused:        Subjective/HPI  Worked with PT yesterday and felt dizzy upon standing. Took few steps. Sat in chair. Eating well. No CP breathing is better. Diuresing. EKG: AV paced 61        Review of Systems:   Review of Systems   Constitutional: Negative. Negative for diaphoresis and fatigue. HENT: Negative. Eyes: Negative. Respiratory: Negative. Negative for cough, chest tightness, shortness of breath, wheezing and stridor. Cardiovascular: Positive for leg swelling. Negative for chest pain and palpitations. Gastrointestinal: Negative. Negative for blood in stool and nausea. Genitourinary: Negative. Musculoskeletal: Negative. Skin: Negative. Neurological: Positive for weakness. Negative for dizziness, syncope and light-headedness. Hematological: Negative. Psychiatric/Behavioral: Negative. Physical Examination:    BP (!) 110/45   Pulse 77   Temp 97.5 °F (36.4 °C)   Resp 16   Ht 5' 3\" (1.6 m)   Wt 269 lb 2.9 oz (122.1 kg)   SpO2 95%   BMI 47.68 kg/m²    Physical Exam   Constitutional: She appears healthy. No distress. HENT:   Normal cephalic and Atraumatic   Eyes: Pupils are equal, round, and reactive to light. Neck: Thyroid normal. No JVD present. No neck adenopathy. No thyromegaly present. Cardiovascular: Normal rate, regular rhythm, normal heart sounds, intact distal pulses and normal pulses. Pulmonary/Chest: Effort normal and breath sounds normal. She has no wheezes. She has no rales. She exhibits no tenderness. Abdominal: Soft. Bowel sounds are normal. There is no abdominal tenderness. Musculoskeletal:         General: No tenderness or edema (2+). Normal range of motion. Cervical back: Normal range of motion and neck supple. Neurological: She is alert and oriented to person, place, and time. Skin: Skin is warm. No cyanosis. Nails show no clubbing.        LABS:  CBC:   Lab Results   Component Value Date    WBC 3.1 09/03/2021    RBC 2.81 09/03/2021    HGB 8.3 09/03/2021    HCT 25.7 09/03/2021    MCV 91.4 09/03/2021    MCH 29.7 09/03/2021    MCHC 32.5 09/03/2021    RDW 15.9 09/03/2021     09/03/2021     CBC with Differential:    Lab Results   Component Value Date    WBC 3.1 09/03/2021    RBC 2.81 09/03/2021    HGB 8.3 09/03/2021    HCT 25.7 09/03/2021     09/03/2021    MCV 91.4 09/03/2021    MCH 29.7 09/03/2021    MCHC 32.5 09/03/2021    RDW 15.9 09/03/2021    LYMPHOPCT 24.6 09/03/2021    MONOPCT 14.8 09/03/2021    BASOPCT 0.8 09/03/2021    MONOSABS 0.5 09/03/2021    LYMPHSABS 0.8 09/03/2021    EOSABS 0.1 09/03/2021    BASOSABS 0.0 09/03/2021     CMP:    Lab Results   Component Value Date     09/03/2021    K 3.4 09/03/2021    CL 97 09/03/2021    CO2 31 09/03/2021    BUN 57 09/03/2021    CREATININE 2.27 Heparin. Resume Eliquis prior to dc. continue Amiodarone and keep on Telemetry. 8. HPL- resume Statin  9. Moderate MR - will need afterload when BP and Kidneys stable  10. Severe PA HTN RVSP 80 mmHg. 11. HypoMag- add Magoxide.         Electronically signed by Magno Finn MD on 9/3/2021 at 8:27 AM

## 2021-09-03 NOTE — PROGRESS NOTES
1200: Assessment complete. Alert and oriented, calm and cooperative. No complaints at this time. Au remains in place. Bed alarm on. Safety maintained. Call light within reach. 1400: 02 at 4L tolerating well down for 5L.    1800: O2 down to 3L at 98%.       Electronically signed by Celeste Avalos RN on 9/3/2021 at 12:00 PM The patient is a 67y Female complaining of bite, animal.

## 2021-09-03 NOTE — PROGRESS NOTES
Department of Internal Medicine  General Internal Medicine  Attending Progress Note      SUBJECTIVE:  Pt seen and examined. No new symptoms.     OBJECTIVE      Medications    Current Facility-Administered Medications: magnesium oxide (MAG-OX) tablet 800 mg, 800 mg, Oral, Daily  meclizine (ANTIVERT) tablet 12.5 mg, 12.5 mg, Oral, BID  potassium chloride (KLOR-CON M) extended release tablet 20 mEq, 20 mEq, Oral, BID WC  metOLazone (ZAROXOLYN) tablet 5 mg, 5 mg, Oral, Daily  bumetanide (BUMEX) tablet 2 mg, 2 mg, Oral, BID  iron sucrose (VENOFER) 200 mg in sodium chloride 0.9 % 100 mL IVPB, 200 mg, IntraVENous, Q24H  midodrine (PROAMATINE) tablet 10 mg, 10 mg, Oral, TID  heparin (porcine) injection 5,000 Units, 5,000 Units, SubCUTAneous, 3 times per day  aspirin EC tablet 81 mg, 81 mg, Oral, Daily  atorvastatin (LIPITOR) tablet 40 mg, 40 mg, Oral, Nightly  epoetin makayla-epbx (RETACRIT) injection 10,000 Units, 10,000 Units, SubCUTAneous, Q7 Days  amiodarone (CORDARONE) tablet 200 mg, 200 mg, Oral, Daily  glucose (GLUTOSE) 40 % oral gel 15 g, 15 g, Oral, PRN  dextrose 50 % IV solution, 12.5 g, IntraVENous, PRN  glucagon (rDNA) injection 1 mg, 1 mg, IntraMUSCular, PRN  dextrose 5 % solution, 100 mL/hr, IntraVENous, PRN  heparin (porcine) injection 1,000 Units, 1,000 Units, IntraVENous, PRN  albuterol (PROVENTIL) nebulizer solution 2.5 mg, 2.5 mg, Nebulization, Q4H PRN  sodium chloride flush 0.9 % injection 5-40 mL, 5-40 mL, IntraVENous, 2 times per day  sodium chloride flush 0.9 % injection 5-40 mL, 5-40 mL, IntraVENous, PRN  0.9 % sodium chloride infusion, 25 mL, IntraVENous, PRN  ondansetron (ZOFRAN-ODT) disintegrating tablet 4 mg, 4 mg, Oral, Q8H PRN **OR** ondansetron (ZOFRAN) injection 4 mg, 4 mg, IntraVENous, Q6H PRN  acetaminophen (TYLENOL) tablet 650 mg, 650 mg, Oral, Q6H PRN **OR** acetaminophen (TYLENOL) suppository 650 mg, 650 mg, Rectal, Q6H PRN  Physical    VITALS:  BP (!) 95/49   Pulse 60   Temp 97.3 °F (36.3 °C) (Oral)   Resp 16   Ht 5' 3\" (1.6 m)   Wt 269 lb 2.9 oz (122.1 kg)   SpO2 93%   BMI 47.68 kg/m²   Constitutional: Awake and alert in no acute distress. Lying in bed comfortably  Head: Normocephalic, atraumatic  Eyes: EOMI, PERRLA  ENT: moist mucous membranes  Neck: neck supple, trachea midline, RIJ CVC in place  Lungs: Good inspiratory effort, no wheeze, no rhonchi, bibasilar crackles  Heart: RRR, normal S1 and S2  GI: Soft, non-distended, non tender, no guarding, no rebound, +BS  MSK: 2+ pitting edema noted bilateral LE's  Skin: warm, dry  Psych: appropriate affect     Data    CBC:   Lab Results   Component Value Date    WBC 3.1 09/03/2021    RBC 2.81 09/03/2021    HGB 8.3 09/03/2021    HCT 25.7 09/03/2021    MCV 91.4 09/03/2021    MCH 29.7 09/03/2021    MCHC 32.5 09/03/2021    RDW 15.9 09/03/2021     09/03/2021     CMP:    Lab Results   Component Value Date     09/03/2021    K 3.4 09/03/2021    CL 97 09/03/2021    CO2 31 09/03/2021    BUN 57 09/03/2021    CREATININE 2.27 09/03/2021    GFRAA 25.0 09/03/2021    LABGLOM 20.7 09/03/2021    GLUCOSE 78 09/03/2021    PROT 5.4 09/03/2021    LABALBU 2.5 09/03/2021    CALCIUM 8.5 09/03/2021    BILITOT 0.5 09/03/2021    ALKPHOS 46 09/03/2021    AST 14 09/03/2021    ALT 6 09/03/2021       ASSESSMENT AND PLAN      # Acute renal failure with associated volume overload and hyperkalemia  - hx of CKD3b  - HD cath placed on admission and urgent dialysis initiated- renal doesn't think HD is needed  - K initially 8.3 despite multiple interventions (insulin, kayexelate, lasix). Improved after dialysis  - nephrology consulted- IV lasix, lokelma  - renal US with L renal cyst  - O2 as needed - wean as tolerated  - PT/OT  - echo with EF 30%  - avoid nephrotoxic meds  - replace electrolytes as needed    # Hx of systolic CHF  - echo noted.  Previous EF 35% and severe PAH  - sp AICD placement  - monitor on tele  - IV lasix and dialysis for fluid removal  - cardiology on consult-seen by Dr. Doroteo Price. Continue on IV diuresis    #Hypotension  -Possibly due to diuresis. Started on midodrine by cardiologist to allow for continued diuresis. # PAF/CAD/HTN/HLD  - cont home meds, holding BP meds in setting of hypotension  - holding nephrotoxic meds    # GOC  - palliative consulted-CODE STATUS-DNR CCA  -Appreciate palliative care help    DVT: on Eliquis    Disposition: Rehab pending cardiology. PT/OT is following. Discussed with .         [facility], DO   Internal Medicine

## 2021-09-03 NOTE — DISCHARGE INSTR - COC
Continuity of Care Form    Patient Name: Amira George   :  1940  MRN:  [id number]    Admit date:  2021  Discharge date:  21    Code Status Order: DNR-CCA   Advance Directives:      Admitting Physician:   Jen Neff MD  PCP: Meron Mistry    Discharging Nurse: [facility] Unit/Room#: C759/S741-07  Discharging Unit Phone Number: [phone]    Emergency Contact:   Extended Emergency Contact Information  Primary Emergency Contact: Rohit Robles  Mobile Phone: [phone]  Relation: Child    Past Surgical History:  Past Surgical History:   Procedure Laterality Date    CARDIAC CATHETERIZATION      CAROTID ENDARTERECTOMY Left     CHOLECYSTECTOMY      HYSTERECTOMY, TOTAL ABDOMINAL      PACEMAKER INSERTION Left     TONSILLECTOMY         Immunization History:   Immunization History   Administered Date(s) Administered    COVID-19, [employer], PF, 30mcg/0.3mL 2021, 2021       Active Problems:  Patient Active Problem List   Diagnosis Code    Hyperkalemia E87.5    JOSIAH on CKD N17.9, N18.9    Acute congestive heart failure (HCC) I50.9    PAF  I48.0    CAD I25.10    HTN I10    HLD E78.5    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    Carotid artery stenosis I65.29    Cervicalgia M54.2    Chronic systolic heart failure (HCC) I50.22    CKD (chronic kidney disease) stage 4, GFR 15-29 ml/min (HCC) N18.4    Dry eye syndrome, bilateral H04.123    Endometrial cancer ([facility] C54.1    Ischemic cardiomyopathy I25.5    BMI 40.0-44.9, adult ([facility] Z68.41    Pulmonary HTN ([facility] I27.20    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    Stented coronary artery Z95.5    Vitamin D deficiency E55.9    Gait abnormality R26.9       Isolation/Infection:   Isolation            No Isolation          Patient Infection Status       None to display            Nurse Assessment:  Last Vital Signs: BP (!) 95/49   Pulse 60   Temp 97.3 °F (36.3 °C) (Oral)   Resp 16   Ht 5' 3\" (1.6 m) Wt 269 lb 2.9 oz (122.1 kg)   SpO2 93%   BMI 47.68 kg/m²     Last documented pain score (0-10 scale): Pain Level: 0  Last Weight:   Wt Readings from Last 1 Encounters:   21 269 lb 2.9 oz (122.1 kg)     Mental Status:  oriented and alert    IV Access:  - None    Nursing Mobility/ADLs:  Walking   Dependent  Transfer  Dependent  Bathing  Dependent  Dressing  Dependent  Toileting  Dependent  Feeding  Independent  Med Admin  Dependent  Med Delivery   whole    Wound Care Documentation and Therapy:        Elimination:  Continence:   · Bowel: No  · Bladder: No  Urinary Catheter: Insertion Date: 21   Colostomy/Ileostomy/Ileal Conduit: No       Date of Last BM: 9/3/21    Intake/Output Summary (Last 24 hours) at 9/3/2021 1457  Last data filed at 9/3/2021 0639  Gross per 24 hour   Intake 240 ml   Output 2850 ml   Net -2610 ml     I/O last 3 completed shifts: In: 56 [P.O.:480; I.V.:10]  Out: 2850 [Urine:2850]    Safety Concerns: At Risk for Falls    Impairments/Disabilities:      None    Nutrition Therapy:  Current Nutrition Therapy:   - Oral Diet:  General, Low Fat and Low Sodium (2gm) low potassuim  (3000ml) per day    Routes of Feeding: Oral  Liquids: Thin Liquids  Daily Fluid Restriction:  Last Modified Barium Swallow with Video (Video Swallowing Test): not done    Treatments at the Time of Hospital Discharge:   Respiratory Treatments: ***  Oxygen Therapy:  is not on home oxygen therapy.   Ventilator:    - No ventilator support    Rehab Therapies: Physical Therapy and Occupational Therapy  Weight Bearing Status/Restrictions: [facility] Weight Bearin}  Other Medical Equipment (for information only, NOT a DME order):  {EQUIPMENT:[id number]}  Other Treatments: ***    Patient's personal belongings (please select all that are sent with patient):  {[facility] DME Belongings:[id number]}    RN SIGNATURE:  Electronically signed by Kapil Goode RN on 21 at 4:52 PM EDT    CASE MANAGEMENT/SOCIAL WORK

## 2021-09-04 NOTE — FLOWSHEET NOTE
Patient was given heparin and now and discharge to NH; vascath in right neck needs to come out and Dr Lavelle Acosta \"OK's\" to still remove it and will take responsibiltiy for possible bleeding. Let special know about it but now [phone] [facility] will go through it with her manager.

## 2021-09-04 NOTE — PROGRESS NOTES
Nephrology Progress Note    Assessment:  Improving JOSIAH   cardiorenal  Fluid overload better  Hypokalemia d/t diuretics  Hypertension  OHD CAD AF  EF 35%  AICD     Plan:continue to achieve evolemia  Watch bmp am and replace K+    Patient Active Problem List:     Hyperkalemia     JSOIAH on CKD     Acute congestive heart failure (HCC)     PAF      CAD     HTN     HLD     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HCC)     Carotid artery stenosis     Cervicalgia     Chronic systolic heart failure (HCC)     CKD (chronic kidney disease) stage 4, GFR 15-29 ml/min (HCC)     Dry eye syndrome, bilateral     Endometrial cancer (HCC)     Ischemic cardiomyopathy     BMI 40.0-44.9, adult ([facility]     Pulmonary HTN (HCC)     Secondary osteoarthritis of multiple sites     Stented coronary artery     Vitamin D deficiency     Gait abnormality      Subjective:  Admit Date: 8/27/2021    Interval History: doing well    Medications:  Scheduled Meds:   potassium chloride  10 mEq IntraVENous Q1H    magnesium oxide  800 mg Oral Daily    meclizine  12.5 mg Oral BID    potassium chloride  20 mEq Oral BID WC    metOLazone  5 mg Oral Daily    bumetanide  2 mg Oral BID    midodrine  10 mg Oral TID    heparin (porcine)  5,000 Units SubCUTAneous 3 times per day    aspirin  81 mg Oral Daily    atorvastatin  40 mg Oral Nightly    epoetin makayla-epbx  10,000 Units SubCUTAneous Q7 Days    amiodarone  200 mg Oral Daily    sodium chloride flush  5-40 mL IntraVENous 2 times per day     Continuous Infusions:   dextrose      sodium chloride         CBC:   Recent Labs     09/02/21  0655 09/03/21  0722   WBC 3.2* 3.1*   HGB 8.5* 8.3*   * 126*     CMP:    Recent Labs     09/02/21 2025 09/03/21  0722 09/03/21  2116    135 132*   K 3.4 3.4 3.3*   CL 96 97 92*   CO2 31 31 30   BUN 57* 57* 57*   CREATININE 2.24* 2.27* 2.15*   GLUCOSE 103* 78 104*   CALCIUM 8.5 8.5 8.4*   LABGLOM 21.0* 20.7* 22.0*     Troponin: No results for input(s): TROPONINI in the last 72 hours.  BNP: No results for input(s): BNP in the last 72 hours. INR: No results for input(s): INR in the last 72 hours. Lipids: No results for input(s): CHOL, LDLDIRECT, TRIG, HDL, AMYLASE, LIPASE in the last 72 hours. Liver:   Recent Labs     09/03/21  0722   AST 14   ALT 6   ALKPHOS 46   PROT 5.4*   LABALBU 2.5*   BILITOT 0.5     Iron:  No results for input(s): IRONS, FERRITIN in the last 72 hours. Invalid input(s): LABIRONS  Urinalysis: No results for input(s): UA in the last 72 hours.     Objective:  Vitals: /72   Pulse 60   Temp 97.2 °F (36.2 °C) (Oral)   Resp 16   Ht 5' 3\" (1.6 m)   Wt 269 lb 2.9 oz (122.1 kg)   SpO2 98%   BMI 47.68 kg/m²    Wt Readings from Last 3 Encounters:   09/03/21 269 lb 2.9 oz (122.1 kg)      24HR INTAKE/OUTPUT:      Intake/Output Summary (Last 24 hours) at 9/4/2021 0954  Last data filed at 9/4/2021 0523  Gross per 24 hour   Intake 680 ml   Output 1900 ml   Net -1220 ml       General: alert, in no apparent distress  HEENT: normocephalic, atraumatic, anicteric  Neck: supple, no mass  Lungs: non-labored respirations, clear to auscultation bilaterally  Heart: regular rate and rhythm, no murmurs or rubs  Abdomen: soft, non-tender, non-distended  Ext: no cyanosis, no peripheral edema  Neuro: alert and oriented, no gross abnormalities  Psych: normal mood and affect  Skin: no rash      Electronically signed by Padmaja Webb DO, MD

## 2021-09-04 NOTE — DISCHARGE SUMMARY
Hospital Medicine Discharge Summary    Lupis Fontenot  :  1940  MRN:  [id number]    Admit date:  2021  Discharge date:  2021    Admitting Physician: Eloy Villalta MD  Primary Care Physician:  Val Augustine      Discharge Diagnoses:      Acute renal failure associated with volume overload cardiorenal syndrome-required dialysis but no longer in dialysis  History of systolic heart failure-echo show EF 30%  Severe pulmonary hypertension  Status post AICD placement  CKD 3B  Hypertension  Paroxysmal A. fib  CAD  HLD  Goals of care-switch to DNR CCA during this admission by palliative care    Chief Complaint   Patient presents with    Shortness of [place] Course: This is an 26-year-old female with a past medical history of CKD 3B, systolic heart failure EF of 30% status post AICD placement, paroxysmal A. fib, CAD, HTN, HLD who presented to the hospital with dyspnea. She was found to have fluid overload in the setting of worsening renal failure. She required HD through temporary dialysis catheter was placed. Her renal function improved and that her volume status also improved with help of IV diuresis by cardiologist and nephrologist.  Dialysis catheter was actually removed prior to discharge. Patient was found to be deconditioned. She was discharged to skilled nursing facility in a stable condition. See medication list below. Exam on discharge:   /72   Pulse 60   Temp 97.2 °F (36.2 °C) (Oral)   Resp 16   Ht 5' 3\" (1.6 m)   Wt 269 lb 2.9 oz (122.1 kg)   SpO2 98%   BMI 47.68 kg/m²     See examined my progress note from today.   Patient was seen by the following consultants while admitted to [facility]:   Consults:  Irene Espinosa 761 TO CASE MANAGEMENT  IP CONSULT TO CARDIOLOGY  IP CONSULT TO PALLIATIVE CARE  IP CONSULT TO REHAB/TCU ADMISSION COORDINATOR    Significant Diagnostic Studies:    Refer to chart     Please refer to chart if no studies are shown here    Echocardiogram complete 2D with doppler with color    Result Date: 8/29/2021  Transthoracic Echocardiography Report (TTE)  Demographics   Patient Name     Seth Yuan Gender                Female   Patient Number   [id number]    Race                                                  Ethnicity   Visit Number     [id number]   Room Number           L338   Corporate ID                 Date of Study         08/28/2021   Accession Number [id number]  Referring Physician   Date of Birth    1940  Sonographer           Cheyenne Holden   Age              [de-identified] year(s)  Interpreting          [facility]) Cardiology                               Physician             Gopi Antunez MD  Procedure Type of Study   TTE procedure:ECHO COMPLETE 2D W/DOP W/COLOR. Procedure Date Date: 08/28/2021 Start: 02:29 PM Study Location: Portable Technical Quality: Adequate visualization Indications:Congestive heart failure. Patient Status: Routine Height: 63 inches Weight: 287 pounds BSA: 2.25 m^2 BMI: 50.84 kg/m^2 BP: 103/22 mmHg  Conclusions   Summary  Pacing wires noted  MItral Leaflets are moderate Thick. 2+MR  Normal tricuspid valve structure and function. 3+ TR  RVSP 80 mmHg  Left ventricular ejection fraction is visually estimated at 30%. Pseudonormal filling pattern noted. Signature   ----------------------------------------------------------------  Electronically signed by Gopi Antunez MD(Interpreting  physician) on 08/29/2021 09:04 AM  ----------------------------------------------------------------   Findings  Left Ventricle Left ventricular ejection fraction is visually estimated at 30%. Pseudonormal filling pattern noted. Right Ventricle Mildly enlarged right ventricle cavity. Left Atrium Moderately dilated left atrium. Right Atrium Moderately enlarged right atrium size. Mitral Valve MItral Leaflets are moderate Thick. 2+MR Tricuspid Valve Normal tricuspid valve structure and function.  3+ TR RVSP Mean Gradient: 1.64 mmHg  LVOT Diameter: 2.31 cm               LVOT VTI: 20.16 cm  Structures  Left Atrium   LA Volume/Index: 205.67 ml /91 m^2            LA Area: 40.87 cm^2   Left Ventricle   Diastolic Dimension: 1.75 cm         Systolic Dimension: 2.92 cm  Septum Diastolic: 0.26 cm            Septum Systolic: 6.48 cm  PW Diastolic: 3.31 cm                PW Systolic: 9.66 cm                                       FS: 15.1 %  LV EDV/LV EDV Index: 168.21 ml/75    LV ESV/LV ESV Index: 115.66 ml/51 m^2  m^2                                  LV Length: 9.99 cm  EF Calculated: 31.2 %   LVOT Diameter: 2.31 cm   Right Atrium   RA Systolic Pressure: 10 mmHg   Right Ventricle   Diastolic Dimension: 0.37 cm                                    RV Systolic Pressure: 47.07 mmHg  Aorta/ Miscellaneous Aorta   Aortic Root: 3.45 cm  LVOT Diameter: 2.31 cm      XR CHEST PORTABLE    Result Date: 8/28/2021  XR CHEST PORTABLE Clinical History:  Shortness of breath. Comparison:  6/20/2006  RESULT: Left transvenous ICD. Bibasilar pleural-parenchymal changes, most likely combination of opacity and pleural effusion. No pneumothorax. Enlarged cardiac mediastinal silhouette, partially obscured by the lung findings. Aortic vascular calcifications with apparent aneurysmal dilation of the thoracic aorta measuring around 4.5 cm, but not well assessed on this study. No distinct acute osseous findings. Bibasilar pleural-parenchymal changes, most likely combination of opacity and pleural effusion. Aortic vascular calcifications with apparent aneurysmal dilation of the thoracic aorta measuring around 4.5 cm, but not well assessed on this study. Size may also be accentuated by AP technique. XR CHEST PORTABLE    Result Date: 8/28/2021  EXAMINATION: XR CHEST PORTABLE CLINICAL HISTORY: LINE PLACED COMPARISONS: CHEST RADIOGRAPH AUGUST 27, 2021 FINDINGS: Patient rotated to left. Pacemaker generator and wires unchanged.  Right jugular vein central line identified with tip in right atrium. Area of increased opacity obscures left diaphragm, left cardiac silhouette, and left mid and lower lung, unchanged. Blunting right costophrenic angle with ill-defined area of increased opacity right mid and right lower lung, unchanged. BILATERAL PLEURAL EFFUSIONS WITH BILATERAL ATELECTASIS/PNEUMONIA, UNCHANGED. US RETROPERITONEAL LIMITED    Result Date: 8/28/2021  EXAMINATION: US RETROPERITONEAL LIMITED CLINICAL HISTORY: ACUTE KIDNEY INJURY FINDINGS: Right kidney measures 11.7 x 4.9 x 5.5 cm. Left kidney measures 10.3 x 5.3 x 5.4 cm. Color flow without anomaly bilaterally. No calculi and no pelvocaliectasis bilaterally. No cortical thinning bilaterally. 5.6 x 6.7 x 4.7 cm cyst lower pole left kidney. LEFT RENAL CYST.       Discharge Medications:       Gloria Jamil   [facility]on Medication Instructions VII:[id number]    Printed on:09/04/21 8527   Medication Information                      acetaminophen (TYLENOL) 325 MG tablet  Take 650 mg by mouth every 6 hours as needed for Pain             allopurinol (ZYLOPRIM) 100 MG tablet  Take 300 mg by mouth daily             amiodarone (CORDARONE) 200 MG tablet  Take 200 mg by mouth daily             aspirin 81 MG EC tablet  Take 1 tablet by mouth daily             atorvastatin (LIPITOR) 20 MG tablet  Take 20 mg by mouth daily             Biotin 5 MG CAPS  Take by mouth daily             bumetanide (BUMEX) 2 MG tablet  Take 1 tablet by mouth 2 times daily             Cholecalciferol (VITAMIN D3) 1.25 MG (90095 UT) CAPS  Take 1,000 Units by mouth 2 times daily             Dextromethorphan-guaiFENesin (ROBITUSSIN DM PO)  Take by mouth daily             fluticasone (FLONASE) 50 MCG/ACT nasal spray  1 spray by Each Nostril route daily             folic acid (FOLVITE) 1 MG tablet  Take 1 mg by mouth daily             magnesium oxide (MAG-OX) 400 (240 Mg) MG tablet  Take 2 tablets by mouth daily             meclizine (ANTIVERT) 12.5 MG tablet  Take 1 tablet by mouth 2 times daily for 10 days             metOLazone (ZAROXOLYN) 5 MG tablet  Take 1 tablet by mouth daily             midodrine (PROAMATINE) 10 MG tablet  Take 1 tablet by mouth 3 times daily             nitroGLYCERIN (NITROSTAT) 0.4 MG SL tablet  Place 0.4 mg under the tongue every 5 minutes as needed for Chest pain up to max of 3 total doses. If no relief after 1 dose, call 911. potassium chloride (KLOR-CON M) 20 MEQ TBCR extended release tablet  Take 40 mEq by mouth daily (with breakfast)                 Disposition:   Skilled nursing facility  Condition at discharge: good     Activity: activity as tolerated    Total time taken for discharging this patient: 40 minutes. Greater than 70% of time was spent focused exclusively on this patient. Time was taken to review chart, discuss plans with consultants, reconciling medications, discussing plan answering questions with patient.      SignedLabob Moran DO  9/4/2021, 10:52 AM  ----------------------------------------------------------------------------------------------------------------------    Yeimi Espinal

## 2021-09-04 NOTE — DISCHARGE SUMMARY
Patient is discharge to [facility], was just  by [facility]. Report called and given to Jennifer PANIAGUA at the nursing home with understanding. Au bag emptied for 450 ml of cloudy yellow urine and record.

## 2021-09-04 NOTE — FLOWSHEET NOTE
Patient's vascath removed at 16:02 pm per Special procedure staff; pressure applied for 12 minutes then  pressure dressing applied. Tolerated procedure well.

## 2021-09-04 NOTE — PROGRESS NOTES
Department of Internal Medicine  General Internal Medicine  Attending Progress Note      SUBJECTIVE:  Pt seen and examined. No new symptoms.     OBJECTIVE      Medications    Current Facility-Administered Medications: potassium chloride 10 mEq/100 mL IVPB (Peripheral Line), 10 mEq, IntraVENous, Q1H  magnesium oxide (MAG-OX) tablet 800 mg, 800 mg, Oral, Daily  meclizine (ANTIVERT) tablet 12.5 mg, 12.5 mg, Oral, BID  potassium chloride (KLOR-CON M) extended release tablet 20 mEq, 20 mEq, Oral, BID WC  metOLazone (ZAROXOLYN) tablet 5 mg, 5 mg, Oral, Daily  bumetanide (BUMEX) tablet 2 mg, 2 mg, Oral, BID  midodrine (PROAMATINE) tablet 10 mg, 10 mg, Oral, TID  heparin (porcine) injection 5,000 Units, 5,000 Units, SubCUTAneous, 3 times per day  aspirin EC tablet 81 mg, 81 mg, Oral, Daily  atorvastatin (LIPITOR) tablet 40 mg, 40 mg, Oral, Nightly  epoetin makayla-epbx (RETACRIT) injection 10,000 Units, 10,000 Units, SubCUTAneous, Q7 Days  amiodarone (CORDARONE) tablet 200 mg, 200 mg, Oral, Daily  glucose (GLUTOSE) 40 % oral gel 15 g, 15 g, Oral, PRN  dextrose 50 % IV solution, 12.5 g, IntraVENous, PRN  glucagon (rDNA) injection 1 mg, 1 mg, IntraMUSCular, PRN  dextrose 5 % solution, 100 mL/hr, IntraVENous, PRN  heparin (porcine) injection 1,000 Units, 1,000 Units, IntraVENous, PRN  albuterol (PROVENTIL) nebulizer solution 2.5 mg, 2.5 mg, Nebulization, Q4H PRN  sodium chloride flush 0.9 % injection 5-40 mL, 5-40 mL, IntraVENous, 2 times per day  sodium chloride flush 0.9 % injection 5-40 mL, 5-40 mL, IntraVENous, PRN  0.9 % sodium chloride infusion, 25 mL, IntraVENous, PRN  ondansetron (ZOFRAN-ODT) disintegrating tablet 4 mg, 4 mg, Oral, Q8H PRN **OR** ondansetron (ZOFRAN) injection 4 mg, 4 mg, IntraVENous, Q6H PRN  acetaminophen (TYLENOL) tablet 650 mg, 650 mg, Oral, Q6H PRN **OR** acetaminophen (TYLENOL) suppository 650 mg, 650 mg, Rectal, Q6H PRN  Physical    VITALS:  /72   Pulse 60   Temp 97.2 °F (36.2 °C) (Oral) Resp 16   Ht 5' 3\" (1.6 m)   Wt 269 lb 2.9 oz (122.1 kg)   SpO2 98%   BMI 47.68 kg/m²   Constitutional: Awake and alert in no acute distress. Lying in bed comfortably  Head: Normocephalic, atraumatic  Eyes: EOMI, PERRLA  ENT: moist mucous membranes  Neck: neck supple, trachea midline, RIJ CVC in place  Lungs: Good inspiratory effort, no wheeze, no rhonchi, bibasilar crackles  Heart: RRR, normal S1 and S2  GI: Soft, non-distended, non tender, no guarding, no rebound, +BS  MSK: 2+ pitting edema noted bilateral LE's  Skin: warm, dry  Psych: appropriate affect     Data    CBC:   Lab Results   Component Value Date    WBC 3.1 09/03/2021    RBC 2.81 09/03/2021    HGB 8.3 09/03/2021    HCT 25.7 09/03/2021    MCV 91.4 09/03/2021    MCH 29.7 09/03/2021    MCHC 32.5 09/03/2021    RDW 15.9 09/03/2021     09/03/2021     CMP:    Lab Results   Component Value Date     09/03/2021    K 3.3 09/03/2021    CL 92 09/03/2021    CO2 30 09/03/2021    BUN 57 09/03/2021    CREATININE 2.15 09/03/2021    GFRAA 26.6 09/03/2021    LABGLOM 22.0 09/03/2021    GLUCOSE 104 09/03/2021    PROT 5.4 09/03/2021    LABALBU 2.5 09/03/2021    CALCIUM 8.4 09/03/2021    BILITOT 0.5 09/03/2021    ALKPHOS 46 09/03/2021    AST 14 09/03/2021    ALT 6 09/03/2021       ASSESSMENT AND PLAN      # Acute renal failure with associated volume overload and hyperkalemia  - hx of CKD3b  - HD cath placed on admission and urgent dialysis initiated- renal doesn't think HD is needed  - K initially 8.3 despite multiple interventions (insulin, kayexelate, lasix). Improved after dialysis  - nephrology consulted- IV lasix, lokelma  - renal US with L renal cyst  - O2 as needed - wean as tolerated  - PT/OT  - echo with EF 30%  - avoid nephrotoxic meds  - replace electrolytes as needed    # Hx of systolic CHF  - echo noted.  Previous EF 35% and severe PAH  - sp AICD placement  - monitor on tele  - IV lasix and dialysis for fluid removal  - cardiology on consult-seen by Dr. Chad Dumont. Continue on IV diuresis    #Hypotension  -Possibly due to diuresis. Started on midodrine by cardiologist to allow for continued diuresis.     # PAF/CAD/HTN/HLD  - cont home meds, holding BP meds in setting of hypotension  - holding nephrotoxic meds    # GOC  - palliative consulted-CODE STATUS-DNR CCA  -Appreciate palliative care help    DVT: on Eliquis    Disposition: SNF today      Martine Cook, DO   Internal Medicine

## 2021-09-04 NOTE — PROGRESS NOTES
Spoke to nurse Rosey Mosquera for this patient. Patient is to have temporary trialysis catheter removed per order of Dr. Luma Glover. She was made aware that Heparin was given subq at 1435. Normally a 12 hour hold. She contacted Dr. Luma Glover and explained that the patient had received heparin. Dr. Luma Glover aware that the patient received the heparin and told Rosey Mosquera that he would take responsibility for the heparin being given and that it was okay to remove the trialysis catheter. Dr. Donovan Serrano , radiologist notified of guidelines of 12 hour hold for heparin and agreed to have the catheter removed as long as Dr. Luma Glover was taking responsibility and was aware of the situation.

## 2021-09-04 NOTE — CARE COORDINATION
[facility] SON BRENDAN NOTIFIED OF DC TO [facility] [place] AND GIVEN ROOM NUMBER AS WELL.

## 2021-09-04 NOTE — FLOWSHEET NOTE
Right IJ temp catheter removal:  VSS. Dressing removed from right IJ temp catheter site by DR-RN and then site prepped with large tinted chloraprep and draped with sterile towels. Sutures removed from catheter. DR-RN instructed pt to hold breath and 12F temp catheter removed, tip intact, and manual digital pressure initiated by DR-RN. VSS. After 15 min, pressure slowly released and site without bleeding and remains soft. Topical skin adhesive applied to right IJ site and once skin adhesive dry, pressure dressing applied. Pt tolerated all well. VSS. Electronically signed by Martin Izaguirre RN on 9/4/2021 at 4:24 PM

## 2021-09-08 NOTE — PROGRESS NOTES
Physical Therapy  Facility/Department: [facility] MED SURG [id number]/[id number]  Physical Therapy Discharge      NAME: Rosita Georges    : 1940 ([de-identified] y.o.)  MRN: [id number]    Account: [de-identified]  Gender: female      Patient has been discharged from acute care hospital. DC patient from current PT program.      Electronically signed by Jakub Del Cid PT on 21 at 4:02 PM EDT

## 2021-09-09 NOTE — PROGRESS NOTES
PATIENT:  JO HARDY    :  1940    DOS:  2021    [facility] [address]    Vital signs, 142/78, 88 bpm, 97.4 F, 22 RR, 95% SpO2 with 2 to 6 L, weight 269 pounds. Patient is a newer patient at [facility] and here status post increased BLE edema and JOSIAH from the hospital.  Does have history of chronic heart failure as well. Does have a pacemaker. Had recent batteries replaced on 2021. Patient remains on referral from the hospital.  Was found to have very high potassium. Was placed on acute hemodialysis. Catheter removed on discharge. She was on KCl supplement before; however, currently on 40 mEq ER of KCl once day along with her diuretics. She is on Bumex 2 mg twice daily as well as metolazone 5 mg once a day. She denies any chest pain, shortness of breath, POI, or wheezing today. She states that her Bumex has gone down substantially since coming to the hospital.  She still complains of inability to get up out of her chair. She will be working with PT/OT the duration of her stay here. Stated today that she was a bit constipated. We will check medications. We will add dose of milk of mag and daily senna as well. Regarding the Au catheter, we will do a voiding trial over the next 24 hours. We will replace the Au if unable to void within that time period. See orders. Otherwise, patient is currently stable. She is eating and drinking well. She is in good spirits. Denies any acute pain or other complaints at the moment. Did state she had a recent carotid endarterectomy, I believe, due to stenosis. We will check record to evaluate. Otherwise, today, patient denies any acute issues. We will order lab work as well as previously discussed constipation medications. No further interventions noted today. MEDICATIONS:  Reviewed. ALLERGIES:  Reviewed. REVIEW OF SYSTEMS:  Constitutional:  Denies any fever, chills, nausea, vomiting, weakness, or fatigue.   She does state 13:22:45    cc: - [facility] [employer]

## 2021-09-29 NOTE — PROGRESS NOTES
Antione Bryan is a [de-identified] y.o. female evaluated via telephone on 2021. Consent:  She and/or health care decision maker is aware that that she may receive a bill for this telephone service, depending on her insurance coverage, and has provided verbal consent to proceed: Yes      Documentation:  I communicated with the patient and/or health care decision maker about     PATIENT:  Jesus Valenzuela    :  1940    DOS:  2021    [facility] [address]    Seen via televisit    Vital signs, 124/51, 71 bpm, SPO2 between 90 and 93% with O2 via nasal cannula, respirations 20. HPI: I spoke with nurse today about patient's overall condition. She was recently diagnosed with pneumonia via mobile x-ray of right lower lung, was started on Levaquin via on-call service. She bounced back fairly well, began to eat and drink after having a day of poor cognition and poor oral intake. Today, it seems to be restarting. Her labs overall show low glucose of 58, chloride 92. No elevations in LFTs or no evident leukocytosis. Her urine did come back negative for UTI. The patient is having some delirium today, for which nurse is calling me about. Her blood pressure seems to be fairly sporadic. Earlier today, nurse stated it was 161/60s and just now is 91/43. The patient does have a pacemaker defibrillator in place at this time. Questioning whether there is a cardiogenic reasoning behind patient's new mental status changes, questionable acute CHF exacerbation. Due to patient's instability and several calls throughout the day with the patient not showing stability with interventions and not eating or drinking well, and generally having an acute decline, we will contact family to request her to be sent to the ED for further evaluation and treatment. We will follow up next time in facility if patient's family elects to avoid ED send out.         Electronically Signed By: Byron Bates PA-C on 2021 10:53:12  ______________________________  Ankita Menchaca PA-C  /[id number]  D: 09/27/2021 18:22:08  T: 09/28/2021 14:22:56    cc: - [facility] [address]    . Details of this discussion including any medical advice provided: see above      I affirm this is a Patient Initiated Episode with a Patient who has not had a related appointment within my department in the past 7 days or scheduled within the next 24 hours. Patient identification was verified at the start of the visit: Yes    Total Time: minutes: 21-30 minutes    The visit was conducted pursuant to the emergency declaration under the [address] [place] [address], [address] [place] [address] authority and the [place] and [email or web address] General Act. Patient identification was verified, and a caregiver was present when appropriate. The patient was located in a state where the provider was credentialed to provide care.     Note: not billable if this call serves to triage the patient into an appointment for the relevant concern      Priscila Chen

## 2021-09-29 NOTE — PROGRESS NOTES
PATIENT:  Johnathon Bahena    ;  1940    DOS:  2021    [facility] [place]    Vital signs reviewed. HPI:  The patient was seen Tuesday for ongoing lethargy. Per nursing staff, patient had been in bed sleeping and refusing care and participation in activity and therapy for most of her day. She refused most of her meals. The other day, she did not eat her breakfast, lunch or dinner throughout the day. We did a PA and lateral chest x-ray, despite patient having no evident leukocytosis or fever. The patient does have history of ongoing left ventricular dysfunction and CHF, as well as pulmonary edema in the past.  The patient had a very long hospital stay earlier this year from late 2020 into mid 2021. She does utilize the pacemaker at this time. She is participating in therapy for right knee pain and dysfunction and doing well. However, yesterday, patient was very lethargic and difficult to arouse. Did find a RLL consolidation, consistent with pneumonia. On-call service did start Levaquin and acidophilus. The patient has had a significant turnaround since yesterday upon starting treatment. The patient is in bed today watching television, finishing up a meal.  She is very pleasant, cooperative. She remembers being slightly out of it yesterday and states that significant improvement. She did participate in therapy today with luz minor complaints about therapy being too hard at times. I have encouraged her to continue participating. No additional concerns today. We will follow up with patient laboratories and watch her progress. We will extend Levaquin as needed. Otherwise, no new additional concerns. MEDICATIONS:  Reviewed. ALLERGIES:  Reviewed. REVIEW OF SYSTEMS:  Constitutional:  Denies any new fever, chills. Vital signs are stable today. No new weakness, some mild fatigue, improved compared to yesterday. No new headache, confusion, lightheadedness.   A&O x3.  No new upper respiratory symptoms. No stuffiness, sneezing, hoarseness, epistaxis or sore throat or neck pain. No swollen lymph nodes reportedly. Cardiopulmonary:  Denies any chest pain. Does complain of intermittent orthopnea. This is a chronic finding due to CHF. Ongoing lower leg edema improved compared to four days ago per patient with increase in her Lasix therapy. Denies any new wheezing, POI or cough. Does have ongoing chronic shortness of breath with O2.  GI:  No new concerns. :  No new concerns. MSK:  Does complain of right knee pain and bilateral upper and lower arthralgias secondary to PT activity and age related osteoporosis. No significant memory deficits. A&O times three. Remaining 14-point ROS unremarkable. PHYSICAL EXAMINATION:  General:  Overall, good hygiene, bright affect today, pleasant, cooperative, some mild pleasant confusion noted today. However, she is A&O 2-3 with very minimal prompting. Good long-term history presentation. MMM, no erythema or exudate on oropharyngeal examination. PERRLA. Cardiopulmonary:  RRR. Prominent apical pulse, 0-+1 pitting lower leg edema today. Her legs are fairly raised in bed. Lung sounds are diminished with some crackles to right lower mid lungs. Abdomen:  Normal bowel sounds. Soft and nontender abdomen. Distention due to obesity. Skin turgor is poor to fair. Mental Status: The patient is awake, alert, oriented, 3/3. ASSESSMENT AND PLAN:  Bacterial pneumonia - continue Levaquin through full course. Utilize PRN's and breathing tx as needed. We will extend abx if evidence of cont leukocytosis. We will draw laboratories to monitor continuously over the next 1 to 2 weeks.         Electronically Signed By: Ruthie Nix PA-C on 09/25/2021 11:08:35  ______________________________  Ruthie Nix PA-C  [id number]  D: 09/22/2021 18:14:57  T: 09/23/2021 01:40:51    cc: - [facility] [place]

## 2021-10-07 PROBLEM — Z91.89: Status: ACTIVE | Noted: 2021-01-01

## 2021-10-15 NOTE — PROGRESS NOTES
PATIENT:  JO HARDY    :  1940    DOS:  2021    [facility] [address]    Vital signs, 112/55, 64 bpm, 98.3 F, 16 RR, 90% on 3 L.    HPI:  The patient is seen today for followup on weakness, COPD and CHF. The patient is seen in her room today. She is seated at angle upright in her room watching television. The patient's NT proBNP has been elevated at 10.5 K, has reduced under 9.7 K, is trending downward. No new evident leukocytosis on weekly labs. Weights have improved as well. On 2021, the patient was 269 pounds, as of 2021, she is 257 pounds. Her breathing has improved. She states that her energy levels are improving as well. The patient is having PT and OT in the room and states that she is improving and is \"gaining strength\" at a slow but steady rate. States that she does enjoy physical therapy as she feels energized afterwards. No new additional changes. We will continue evaluating cardiopulmonary condition including vital signs. We will do CBC, BMP, and BNP routine on 2021, to continue monitoring. The patient has no new chills. Denies any NVD. Ongoing chronic fatigue and general weakness secondary to advanced CHF and COPD, general weakness, doing PT and OT. Weight has decreased, see HPI. REVIEW OF SYSTEMS:  Denies any confusion, headache, or LOC. No new syncopal events or focal weakness. Cardiopulmonary:  Denies any CP, palpitations or lower leg claudication. Does have ongoing lower leg edema, 1-2 plus pitting, ongoing orthopnea secondary to congestive heart failure. Does sleep with 1-2 pillow orthopnea at upright angle. Ongoing moderate to severe COOPER. Denies any new cough, ongoing chronic shortness of breath, utilizing O2 via nasal cannula. Denies POI, wheezing or hemoptysis. GI:  No new concerns. :  No new concerns. Does have history of recurrent UTI, monitoring closely. MSK:  Overall generalized weakness.   No new

## 2021-10-17 PROBLEM — J44.9 COPD MIXED TYPE (HCC): Status: ACTIVE | Noted: 2021-01-01

## 2021-10-18 NOTE — PROGRESS NOTES
Patient Name: Sebastián Posadas  YOB: 1940  Medical Record Number: [id number]    History of Present Illness: This 81-year-old woman is been here for recent hospitalization. Patient has known history of heart failure with last known ejection fraction of 30% status post AICD placement patient does have a history of coronary disease and A. fib patient had increasing shortness of breath weakness patient had evidence acute fluid overload had worsening renal impairment patient did undergo notes by nephrology she did qualify for temporary hemodialysis access was placed patient was stabilized for the renal function patient had evidence of fluid overload was lightly diuresed patient with function monitor closely still under course of therapy was seen by cardiology was seen pulmonology patient did make gains blood pressure improved stabilized was transferred here for ongoing course of care. States she is in her room pleasant globally weak coughing time obese at her baseline patient is pain-free at this time. Review of Systems   Constitutional: Positive for fatigue. HENT: Negative for congestion. Respiratory: Positive for cough. Cardiovascular: Positive for leg swelling. Negative for chest pain. Gastrointestinal: Positive for constipation. Genitourinary: Negative for dysuria. Musculoskeletal: Negative for arthralgias. Neurological: Positive for weakness. Negative for seizures. All other systems reviewed and are negative.       Review of Systems: All 14 review of systems negative other than as stated above    Social History     Tobacco Use    Smoking status: Former Smoker     Quit date: 2016     Years since quittin.1    Smokeless tobacco: Never Used   Vaping Use    Vaping Use: Never used   Substance Use Topics    Alcohol use: Yes     Comment: socially    Drug use: Never         Past Medical History:   Diagnosis Date    Atrial fibrillation ([address]     CAD (coronary artery disease)  [facility] [address]     CHF (congestive heart failure) (HCC)     Hyperlipidemia     Hypertension     Obesity            Past Surgical History:   Procedure Laterality Date    CARDIAC CATHETERIZATION      CAROTID ENDARTERECTOMY Left     CHOLECYSTECTOMY      HYSTERECTOMY, TOTAL ABDOMINAL      PACEMAKER INSERTION Left     TONSILLECTOMY           Current Outpatient Medications on File Prior to Visit   Medication Sig Dispense Refill    aspirin 81 MG EC tablet Take 1 tablet by mouth daily 30 tablet 3    bumetanide (BUMEX) 2 MG tablet Take 1 tablet by mouth 2 times daily 30 tablet 3    metOLazone (ZAROXOLYN) 5 MG tablet Take 1 tablet by mouth daily 30 tablet 3    magnesium oxide (MAG-OX) 400 (240 Mg) MG tablet Take 2 tablets by mouth daily 30 tablet     midodrine (PROAMATINE) 10 MG tablet Take 1 tablet by mouth 3 times daily 90 tablet 3    acetaminophen (TYLENOL) 325 MG tablet Take 650 mg by mouth every 6 hours as needed for Pain      allopurinol (ZYLOPRIM) 100 MG tablet Take 300 mg by mouth daily      amiodarone (CORDARONE) 200 MG tablet Take 200 mg by mouth daily      atorvastatin (LIPITOR) 20 MG tablet Take 20 mg by mouth daily      Biotin 5 MG CAPS Take by mouth daily      Cholecalciferol (VITAMIN D3) 1.25 MG (56860 UT) CAPS Take 1,000 Units by mouth 2 times daily      fluticasone (FLONASE) 50 MCG/ACT nasal spray 1 spray by Each Nostril route daily      folic acid (FOLVITE) 1 MG tablet Take 1 mg by mouth daily      nitroGLYCERIN (NITROSTAT) 0.4 MG SL tablet Place 0.4 mg under the tongue every 5 minutes as needed for Chest pain up to max of 3 total doses. If no relief after 1 dose, call 911.  potassium chloride (KLOR-CON M) 20 MEQ TBCR extended release tablet Take 40 mEq by mouth daily (with breakfast)      Dextromethorphan-guaiFENesin (ROBITUSSIN DM PO) Take by mouth daily       No current facility-administered medications on file prior to visit.        Allergies   Allergen Reactions    Codeine      Other reaction(s): GI Upset  nausea & headaches           No family history on file. Physical Exam:      Physical Exam  Vitals and nursing note reviewed. Constitutional:       Appearance: Normal appearance. HENT:      Head: Normocephalic and atraumatic. Nose: Nose normal.      Mouth/Throat:      Mouth: Mucous membranes are dry. Eyes:      Extraocular Movements: Extraocular movements intact. Cardiovascular:      Rate and Rhythm: Normal rate and regular rhythm. Pulmonary:      Effort: Pulmonary effort is normal.      Breath sounds: Wheezing present. Abdominal:      General: Bowel sounds are normal. There is no distension. Palpations: Abdomen is soft. Musculoskeletal:         General: Swelling present. Cervical back: Neck supple. No tenderness. Skin:     General: Skin is warm. Findings: No rash. Neurological:      Mental Status: She is alert. Psychiatric:         Mood and Affect: Mood normal.         There were no vitals taken for this visit.       .   Lab Results   Component Value Date    WBC 3.8 (L) 09/04/2021    HGB 8.7 (L) 09/04/2021    HCT 26.5 (L) 09/04/2021    MCV 92.1 09/04/2021     09/04/2021     Lab Results   Component Value Date     09/04/2021     09/04/2021    K 3.7 09/04/2021    K 3.4 09/04/2021    CL 93 09/04/2021    CL 95 09/04/2021    CO2 32 09/04/2021    CO2 32 09/04/2021    BUN 57 09/04/2021    BUN 59 09/04/2021    CREATININE 2.00 09/04/2021    CREATININE 2.08 09/04/2021    GLUCOSE 72 09/04/2021    GLUCOSE 72 09/04/2021    CALCIUM 8.1 09/04/2021    CALCIUM 8.2 09/04/2021                ASSESSMENT:  Patient Active Problem List   Diagnosis    Hyperkalemia    JOSIAH on CKD    Acute [facility]ive [facility] failure [facility]    PAF     CAD    HTN    HLD    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[facility])    Carotid artery stenosis    Cervicalgia    Chronic systolic heart failure ([facility])    CKD (chronic kidney disease) stage 4, GFR 15-29 ml/min ([facility])    Dry eye syndrome, bilateral    [facility] ([facility]    Ischemic cardiomyopathy    BMI 40.0-44.9, adult ([facility]    Pulmonary HTN ([facility]    Secondary osteoarthritis of multiple sites    Stented coronary artery    Vitamin D deficiency    Gait abnormality    At risk for complication associated with hypotension    COPD mixed type (HCC)         PLAN:   Diagnosis Orders   1. COPD mixed type ([facility]     2. Pulmonary HTN ([facility]     3. Ischemic cardiomyopathy     4. Acute on chronic systolic congestive heart failure ([facility]       This patient will undergo ongoing risk for treatments. Submental oxygen needed ongoing surveillance for COVID-19. Monitoring for evidence of acute decompensation from cardiac standpoint patient may need intermittent diuresis monitor renal function closely patient has found transient hemodialysis encourage oral fluids. Repeat blood work is pending.

## 2021-10-21 PROBLEM — I50.9 ACUTE CONGESTIVE HEART FAILURE (HCC): Status: RESOLVED | Noted: 2021-01-01 | Resolved: 2021-01-01

## 2021-11-04 NOTE — PROGRESS NOTES
Michaela Ramirez is a [de-identified] y.o. female evaluated via telephone on 10/27/2021. Consent:  She and/or health care decision maker is aware that that she may receive a bill for this telephone service, depending on her insurance coverage, and has provided verbal consent to proceed: Yes      Documentation:  I communicated with the patient and/or health care decision maker about     PATIENT: Shabbir Cochran    :  1940    DOS:  10/27/2021    [facility] [address] [place]    Seen via televisit    Discussed with nurse today. The patient is in her room comfortable. She had labs drawn this morning. BMP showed an elevated BUN of 64, and creatinine of 2.1, as well as a critical low potassium of 2.8. The patient is getting regular Lasix therapy, as well as 40 mEq of KCl p.o. q.a.m. Nurse reported no significant symptom changes at the moment. We will add an extra 20 mEq of potassium today. The patient to have her other 40 mEq tomorrow morning followed by a serum potassium with additional potassium given as needed. We will follow up with patient in person either Thursday or Friday to reassess and evaluate medications. We will give IV fluids potentially if patient does not respond well to p.o. fluids. Electronically Signed By: Bradley Skinner PA-C on 10/28/2021 10:10:12  ______________________________  Bradley Skinner PA-C  [id number]  D: 10/27/2021 18:02:18  T: 10/27/2021 22:48:45    cc: - [facility] [place]. Details of this discussion including any medical advice provided: see above      I affirm this is a Patient Initiated Episode with a Patient who has not had a related appointment within my department in the past 7 days or scheduled within the next 24 hours.     Patient identification was verified at the start of the visit: Yes    Total Time: minutes: 11-20 minutes    The visit was conducted pursuant to the emergency declaration under the [facility] waiver authority and the Coronavirus Preparedness and Response Supplemental Appropriations Act. Patient identification was verified, and a caregiver was present when appropriate. The patient was located in a state where the provider was credentialed to provide care.     Note: not billable if this call serves to triage the patient into an appointment for the relevant concern      Priscila Brock

## 2021-11-05 NOTE — PROGRESS NOTES
PATIENT:  JO HARDY    :  1940    DOS:  10/26/2021    [facility] [address]    Vital signs 124/64, 64 bpm, 18  RR, 97.0 F, 97% SPO2 and weight 200.4 pounds. HPI:  The patient is seen today for followup status post hospital stay. The patient was admitted to the hospital for AMS with hypercalcemia, hypomagnesemia, thrombocytopenia and anemia. The patient was stabilized there. She was given IV fluids in hospital, as well as additional Lasix. Calcium did improve with denosumab and zoledronic acid. The patient did have steroid taper done. She has completed steroids. Did have a calcium of approximately 50.6 while in the hospital.  The patient has followups with hematology/oncology already scheduled. The patient overall is doing well today. She is breathing well, eating in her room. States no new discomfort or pain. She is alert and orientated 3/3. Vital signs have been stable since she has been here. Expressed to her that we will continue per hematology specialist suggestions to monitor calcium, magnesium as well as CBC. We will continue to do so. The patient will do her followups, will do routine calcium, magnesium levels q. weekly for a few weeks until stabilized and we will continue monitoring routinely every two weeks for the time being, unless more frequent monitoring warranted. The patient did not have any new muscle cramps, AMS, tachyarrhythmias noted at this time. No additional sequelae noted. MEDICATIONS:  Reviewed. ALLERGIES:  Reviewed. REVIEW OF SYSTEMS:  See HPI. PHYSICAL EXAMINATION:  General:  Good hygiene overall, bright affect. Pleasant and cooperative. A&O times 3. HEENT:  MMM, no erythema or exudate on oropharyngeal examination. Pupils are equal, round and react to light equally. EOMs WNL. Cardiopulmonary:  Regular rate and rhythm. Murmur present on auscultation. Plus one pitting lower leg edema. Pedal pulses intact bilaterally.   Lung sounds are clear to auscultation with some very minor expiratory wheezing noted. Abdomen:  Normal bowel sounds. Soft and nontender abdomen:  Skin:  Skin turgor within normal limits. No new bruising, petechiae, hematoma or any injury noted. No wounds. Mental Status: The patient is awake, alert and oriented, 3/3. ASSESSMENT AND PLAN:  1. Hypomagnesemia/hypocalcemia/hypokalemia. The patient to follow up with hematology per schedule. We will monitor calcium, magnesium q. week on Tuesday for two weeks, then every two weeks thereafter. Continue current diuretic therapy with metolazone and Bumex. Monitor for altered mental status. Cont KCL supp. 2.   Thrombocytopenia - the patient to follow up with hematology. Monitor CBC p.r.n. Monitor for bleeding.         Electronically Signed By: Brain Fields PA-C on 10/31/2021 16:13:20  ______________________________  [employer] [place]/[id number]  D: 10/29/2021 22:30:41  T: 10/30/2021 02:59:51    cc: - [facility] [place]

## 2021-11-05 NOTE — PROGRESS NOTES
PATIENT:  JO HARDY    :  1940    DOS:  10/29/2021    Vital signs, 130/68, 60 bpm, 16 RR, 98.1 F, 94% SpO2 on room air and weight 200.8 pounds. HPI:  The patient is seen today for history of acute hypokalemia and history of hypercalcemia currently, as well as hx of pulm HTN, and systolic CHF. The patient is on Bumex 1 mg daily, as well as metolazone 5 mg daily. She continues to have low potassium on her last check, today was 2.7, despite additional mEq of potassium. The patient was originally at 36, now is on 50 mEq daily. The patient to receive extra doses of 20 mEq over the past two days. Did evaluate patient today. Her breathing is well controlled. Denies any new pulmonary edema or orthopnea worsening. We are monitoring her calcium, magnesium, as well. Discuss reducing metolazone to 2.5 mg daily and adding additional Bumex on alternating days, 2 mg Monday, Wednesday, Friday, and 1 mg on opposing days. We will continue monitoring tracking potassium over the course of the next week, every Tuesday and Friday. We will continue 50 mEq potassium daily and make a dose adjustment as patient's potassium hopefully increases back to normal limits. She denies any acute cardiopulmonary process at this time. Overall, stable. We will follow up on Tuesday. MEDICATIONS:  Reviewed. ALLERGIES:  Reviewed. REVIEW OF SYSTEMS:  No new vomiting, nausea, weakness or fatigue beyond baseline. The patient is up in the chair today feeling well, she states. HEENT:  Denies headache, confusion, lightheadedness. Cardiopulmonary:  Denies any chest pain. No new palpitations. Denies lower leg claudication. Does have ongoing chronic lower leg edema. No worsening orthopnea, ongoing mild COOPER, improved since last visit. No new cough SOB, POI or wheezing. Gastrointestinal:  No new complaints. Eating well. She is eating her lunch today during visit. Denies abdominal pain, constipation, diarrhea.   :  No new concerns. MSK:  Denies any new changes. Psychiatric:  Mood is overall stable, pleasant, cooperative. Remaining 14-point ROS unremarkable. PHYSICAL EXAMINATION:  Good hygiene, bright affect, watching television today in her room, sitting at bedside in her wheelchair. Pupils are equal, round and react to light equally. MMM, no erythema or exudate on oropharyngeal examination. Cardiopulmonary:  RRR. Plus one pitting lower leg edema with legs leaning over chair. Lung sounds are clear with some minor right-sided wheezing. Abdomen:  Normal bowel sounds. Soft and nontender abdomen. Mental Status: The patient is awake, alert, oriented, 3/3. ASSESSMENT AND PLAN:  Hypokalemia / pulmonary HTN / systolic CHF - continue potassium orders as given. Change Bumex to 2 mg p.o. Monday, Wednesday, Friday, 1 mg all alternating days, metolazone decreased 2.5 mg daily. We will follow trends and BNP, magnesium, and calcium levels to assess early next week.         Electronically Signed By: Jesus Green PA-C on 10/31/2021 16:13:17  ______________________________  Jesus Green PA-C  /[id number]  D: 10/29/2021 13:00:24  T: 10/30/2021 02:59:42

## 2021-11-15 NOTE — PROGRESS NOTES
PATIENT:  Charissa Davalos    :  1940    DOS:  11/10/21    THE [facility] [place]     Vital signs reviewed. See [facility]. Within normal limits. HPI:  Patient seen today for history of hyperkalemia, BLE edema, pulmonary edema secondary to CHF, generalized weakness, as well as sarcoidosis. Patient's breathing has been well controlled. She is breathing well. SpO2 is within normal limits. Utilizes p.r.n. oxygen effectively. Patient is alert and active today. No new fatigue complaints. Patient is stating she is getting more energetic. She is able to walk to and from physical therapy on her own utilizing a walker which is a new baseline for her. Her overall condition is improving. Her potassium has been well controlled at this point with slowly tapering downward and making use of potassium chloride, as well as Aldactone. She is continuing to get metolazone as well. Lower leg edema is improved, slightly pitting; however, much improved compared to her first arrival and prior visit. No new bibasilar crackles noted. The patient is deep breathing easily without excessive effort. Patient is smiling and cooperative throughout visit today. Jovial and laughing. Patient is wanting to discharge home with her grandson over the next week. We will discuss with physical therapy to see if this is a possibility. Presumed discharge date likely next Wednesday. Patient yet to have Pulmonary followup. Some kinks in working out scheduling. We will attempt for virtual visit in the near future. Continues prednisone taper until . No new additional complaints today. Eating and drinking well. We will continue current care parameters. MEDICATIONS:  Reviewed. ALLERGIES:  Reviewed. REVIEW OF SYSTEMS:  See HPI. PHYSICAL EXAMINATION:  Good hygiene overall, bright affect. Pleasant, cooperative, no acute distress.   HEENT:  Pupils equal, round, and react to light equally on limited exam. EOMs WNL. Cardiopulmonary:  RRR. Murmur heard on auscultation near aortic area approximately 3/6. No excessive lower leg edema, slightly pitting bilaterally. Pedal pulses intact bilaterally. Lung sounds are clear to auscultation. Diminished lungs bilaterally. Abdominal:  Normoactive bowel sounds. Mental Status:  Patient is awake, alert, oriented 3/3. ASSESSMENT AND PLAN:  1. Hypokalemia-continue current care plan. We will continue monitoring potassium up until point of discharge. 2.   BLE edema-patient's edema has improved with metolazone, Aldactone. Continue monitoring. 3.   CHF with pulmonary edema-no new crackles or adventitious breath sounds noted. Continue monitoring. Continue diuretics as ordered. Generalized weakness-patient is improving with PT. Encouraged to continue at home upon discharge. 4.   Sarcoidosis-continue prednisone taper. We will follow up with Pulmonology as soon as possible. 5.   CKD with elevated BUN-BUN 62 today, improved compared to last lab. Continue emphasizing mild increased fluids p.o.           Electronically Signed By: Karla Deluca PA-C on 11/11/2021 17:13:16  ______________________________  Karla Deluca PA-C  [id number]  D: 11/10/2021 17:55:33  T: 11/11/2021 04:37:54    cc: - [facility] [place]

## 2021-11-15 NOTE — PROGRESS NOTES
Oksana Galeazzi is a [de-identified] y.o. female evaluated via telephone on 11/3/2021. Consent:  She and/or health care decision maker is aware that that she may receive a bill for this telephone service, depending on her insurance coverage, and has provided verbal consent to proceed: Yes      Documentation:  I communicated with the patient and/or health care decision maker about     PATIENT:  Jaylon Vidal    :  1940    DOS:  11/3/2021    TELEVISIT     Discussed with nurse today the patient's ongoing hypokalemia. The patient did have recent dose adjustment for edema of Bumex, metolazone, and addition of Aldactone. Currently started Aldactone 12.5 mg daily today in place of additional Bumex. Potassium did decrease from 3 to 2.7 today. We will give additional 20 mEq of KCl added to her routine 40 mEq daily. We will continue routine BMPs to monitor potassium. We will do additional Aldactone dose increase as well, as indicated. Electronically Signed By: Ruthie Nix PA-C on 2021 17:26:26  ______________________________  Ruthie Nix PA-C  [id number]  D: 2021 17:38:48  T: 2021 17:16:37. Details of this discussion including any medical advice provided: see above      I affirm this is a Patient Initiated Episode with a Patient who has not had a related appointment within my department in the past 7 days or scheduled within the next 24 hours. Patient identification was verified at the start of the visit: Yes    Total Time: minutes: 11-20 minutes    The visit was conducted pursuant to the emergency declaration under the [address] [place] [address], [address] [place] [address] authority and the [employer] General Act. Patient identification was verified, and a caregiver was present when appropriate. The patient was located in a state where the provider was credentialed to provide care.     Note: not billable if this call serves to triage the patient into an appointment for the relevant concern      Priscila Brock

## 2021-11-15 NOTE — PROGRESS NOTES
PATIENT:  Adelita Duran    :  1940    DOS:      [facility] [place]    Vital signs, 124/53, 61 bpm, 98.1 F, 96% SpO2 room air. HPI:  Patient seen today for history of chronic CHF, hypomagnesemia, and hypercalcemia, as well as hypokalemia. Patient has routine labs. We are ordering repeat to monitor, status post her return from [facility] inpatient stay. Currently doing well. She is improving in PT substantially, walking approximately 75 feet in the hallway with walker, which is a record for her. She does continue to have ongoing low potassium, is improving, however, from 2.7 over the last week 3.0 today. We will give an extra 20 mEq today KCl. Patient currently on metolazone and Bumex daily with alternating doses of Bumex every other day 2to 1-mg dose respectively. Due to ongoing hypokalemia, we will reduce Bumex to 1 mg daily and begin low-dose Aldactone to 12.5 mg p.o. daily for ongoing pulmonary, lower leg edema. We will do additional dose increase as needed. If potassium continues to remain low, we will change daily potassium to 40 mEq daily. We will continue checking Monday, Wednesday, Friday BMPs and magnesium levels. The patient does have Hematology followup on  this month. She did have a Pulmonology followup today; however, it was canceled at the last minute upon her arrival to facility. We will have Endo followup on . In the meantime, we will monitor labs and adjust medications as needed. Additionally, patient has elevated BUN and creatinine today approximately 70 and 2.3 respectively. We will consult with Nephrology due to ongoing CKD, hypercalcemia. Patient is on ongoing prednisone taper for sarcoidosis likely challenging kidneys. Prednisone taper does end 2021. MEDICATIONS:  Reviewed. ALLERGIES:  Reviewed. REVIEW OF SYSTEMS:  Patient denying any fever, chills, nausea, vomiting, ongoing mild chronic fatigue.   Denies any acute weight changes. Weight has been stable around 200 pounds regularly. HEENT:  Denies headache, confusion, lightheadedness, and A and O x3. Cardiopulmonary:  Denies CP, lower leg claudication. Does present with ongoing chronic lower leg edema and mild dyspnea on exertion. No new worsening orthopnea. No new cough, SOB, POI, or wheezing. Denies any new GI concerns. Overall appetite is within normal limits. No new UTI symptoms. Mood is overall stable. No acute depression and anxiety. PHYSICAL EXAMINATION:  General:  Good hygiene, bright affect, no acute distress. HEENT:  PERRLA. EOMs WNL. No erythema, exudate. RRR. No significant lower leg edema. Lung sounds are slightly diminished bilaterally, particularly at the bases. No crackles or rales/rhonchi noted. Abdominal:  Normal bowel sounds. Mental Status:  Patient is awake, alert, oriented 3/3. Good memory, good command following. ASSESSMENT AND PLAN:  1. Hypokalemia-20 mEq Stat KCl today. We will add Aldactone. Monitor BMP Monday, Wednesday, Friday x2 weeks. 2.   Anemia / thrombocytopenia-continue monitoring CBC Monday, Wednesday, Friday x2 weeks. Hematology followup, November 17, 2021. Hypomagnesemia. Monitor magnesium level Monday, Wednesday, Friday. 3.   Hypercalcemia. We will do aldosterone level November 3.  4.   COPD/CHF-we will have Pulmonology followup rescheduled for November 16, 2021.          Electronically Signed By: Henrik Rice PA-C on 11/04/2021 12:04:02  ______________________________  CESAR Slater/[id number]  D: 11/03/2021 01:03:39  T: 11/03/2021 21:17:23    cc: - The [employer] [facility] [place]

## 2021-11-30 NOTE — PROGRESS NOTES
PATIENT:  Anjel Madsen    :  1940    DOS:  2021    [facility] [place]     Vital signs reviewed. See [facility]. REASON FOR VISIT:  Seen today for discharge summary report. SUBJECTIVE:  The patient did have an initial stay, subsequent ED transfer due to pulmonary edema and CHF-related issues and was subsequently transferred back here for further therapy and recovery. The patient was started on sarcoidosis treatment while in hospital, as well as more intensive diuretic therapy for edema. Has made significant gains in recovery. She is alert and oriented, seen in her dining room today, where she is eating a meal.  She is active and pleasant. She has been able to walk farther this stay than she has in recent past, which she is excited about. She will be discharged home with home health care and PT. She does have an adequate support system at home between her family. We will be discharging home tomorrow. We will fill scripts for at home medications as currently ordered. The patient instructed to follow up with PCP within one to two weeks. ALLERGIES:  Reviewed. MEDICATIONS:  See discharge pt medication list.      REVIEW OF SYSTEMS:  Constitutional:  No fever, chills. Denies any new weakness, fatigue beyond baseline. The patient is improving in general strength, albeit gradually. No new acute weight changes. HEENT:  Denies any LOC, headache, confusion, or trauma. No lightheadedness or vision changes. Neurologic:  No new syncope, slurring of speech. Does utilize 1-to-2 assist for long bouts of walking however, can walk greater than 100 feet on her own with walker. Cardiopulmonary:  Denies any CP, orthopnea, PND, or COOPER. Does have ongoing chronic lower leg edema, dependent lower leg edema. No new palpitations or claudication reported today. Denies any new cough, POI or wheezing. Does have ongoing shortness of breath particularly with exertion. GI:  No new complaints. :  No new complaints. Does have history of recurrent UTI. MSK:  Moderate joint instability, however, good range of motion and strength, ability to walk greater than 100 feet. No new swelling, redness, or joint pain reportedly. Psychiatric:  Mood is overall stable, very pleasant and cooperative, good outlook. Alert and oriented, 3/3. Remaining 14-point ROS unremarkable. PHYSICAL EXAMINATION:  General:  Good hygiene, bright affect, pleasant, overall, a good appetite seen in dining room. She is finishing her lunch. HEENT:  PERRLA. EOMs WNL. MMM, no erythema or exudate. Semi-edentulous. Cardiopulmonary:  RRR. No JVD. Lower leg edema present, +1 pitting. Overall lung sounds are clear. Mildly diminished bilaterally throughout. No wheezes, rales, or rhonchi. Abdomen:  Normal bowel sounds. Soft and nontender abdomen throughout. Mental Status: The patient is awake, alert, oriented, 3/3. Good memory. Good command following. ASSESSMENT AND PLAN:  1. Sarcoidosis - continue prednisone therapy. Did see Pulmonology yesterday for dose reduction of prednisone. Continue as instructed by specialist.    2.   Hypertension-no new changes. Continue current medications. Blood pressure well controlled. 3.   Chronic obstructive pulmonary disease/respiratory failure-to follow up with Pulmonology as instructed. Currently no new changes to orders. Utilize p.r.n. as needed. 4.   Weakness and instability-continue PT with home health care at home upon discharge. DClastline.         Electronically Signed By: Tricia Veloz PA-C on 11/24/2021 16:51:41  ______________________________  CESAR Hernandez/[id number]  D: 11/23/2021 17:22:39  T: 11/24/2021 13:26:18    cc: - [facility] [place]

## 2022-01-01 ENCOUNTER — APPOINTMENT (OUTPATIENT)
Dept: GENERAL RADIOLOGY | Age: 82
End: 2022-01-01
Payer: MEDICARE

## 2022-01-01 ENCOUNTER — APPOINTMENT (OUTPATIENT)
Dept: CT IMAGING | Age: 82
End: 2022-01-01
Payer: MEDICARE

## 2022-01-01 ENCOUNTER — HOSPITAL ENCOUNTER (EMERGENCY)
Age: 82
Discharge: HOME | End: 2022-01-26
Attending: EMERGENCY MEDICINE
Payer: MEDICARE

## 2022-01-01 ENCOUNTER — HOSPITAL ENCOUNTER (EMERGENCY)
Age: 82
End: 2022-08-11
Attending: INTERNAL MEDICINE
Payer: MEDICARE

## 2022-01-01 VITALS
RESPIRATION RATE: 18 BRPM | HEART RATE: 63 BPM | OXYGEN SATURATION: 98 % | TEMPERATURE: 98 F | DIASTOLIC BLOOD PRESSURE: 72 MMHG | SYSTOLIC BLOOD PRESSURE: 119 MMHG

## 2022-01-01 VITALS
RESPIRATION RATE: 18 BRPM | HEART RATE: 67 BPM | SYSTOLIC BLOOD PRESSURE: 124 MMHG | OXYGEN SATURATION: 97 % | WEIGHT: 188 LBS | BODY MASS INDEX: 33.3 KG/M2 | DIASTOLIC BLOOD PRESSURE: 88 MMHG | TEMPERATURE: 98.2 F

## 2022-01-01 DIAGNOSIS — S09.90XA CLOSED HEAD INJURY, INITIAL ENCOUNTER: Primary | ICD-10-CM

## 2022-01-01 DIAGNOSIS — I46.9 CARDIOPULMONARY ARREST (HCC): Primary | ICD-10-CM

## 2022-01-01 LAB
EKG ATRIAL RATE: 67 BPM
EKG P AXIS: -18 DEGREES
EKG Q-T INTERVAL: 444 MS
EKG QRS DURATION: 116 MS
EKG QTC CALCULATION (BAZETT): 469 MS
EKG R AXIS: -61 DEGREES
EKG T AXIS: 33 DEGREES
EKG VENTRICULAR RATE: 67 BPM
ETHANOL PERCENT: NORMAL G/DL
ETHANOL: <10 MG/DL (ref 0–0.08)

## 2022-01-01 PROCEDURE — 92950 HEART/LUNG RESUSCITATION CPR: CPT

## 2022-01-01 PROCEDURE — 99284 EMERGENCY DEPT VISIT MOD MDM: CPT

## 2022-01-01 PROCEDURE — 99283 EMERGENCY DEPT VISIT LOW MDM: CPT

## 2022-01-01 PROCEDURE — 93010 ELECTROCARDIOGRAM REPORT: CPT | Performed by: INTERNAL MEDICINE

## 2022-01-01 PROCEDURE — 82077 ASSAY SPEC XCP UR&BREATH IA: CPT

## 2022-01-01 PROCEDURE — 6830039000 HC L3 TRAUMA ALERT

## 2022-01-01 PROCEDURE — 6830039001 HC L3 TRAUMA PRIORITY

## 2022-01-01 PROCEDURE — 93005 ELECTROCARDIOGRAM TRACING: CPT | Performed by: EMERGENCY MEDICINE

## 2022-01-01 PROCEDURE — 36415 COLL VENOUS BLD VENIPUNCTURE: CPT

## 2022-01-01 PROCEDURE — 36600 WITHDRAWAL OF ARTERIAL BLOOD: CPT

## 2022-01-01 PROCEDURE — 70450 CT HEAD/BRAIN W/O DYE: CPT

## 2022-01-01 PROCEDURE — 73630 X-RAY EXAM OF FOOT: CPT

## 2022-01-01 RX ORDER — DEXTROMETHORPHAN HBR, GUAIFENESIN 20; 200 MG/10ML; MG/10ML
SYRUP ORAL
Qty: 118 ML | Refills: 0 | OUTPATIENT
Start: 2022-01-01

## 2022-01-01 RX ORDER — DEXTROMETHORPHAN HBR, GUAIFENESIN 20; 200 MG/10ML; MG/10ML
SYRUP ORAL
Qty: 118 ML | Refills: 10 | OUTPATIENT
Start: 2022-01-01

## 2022-01-01 ASSESSMENT — ENCOUNTER SYMPTOMS
EYE DISCHARGE: 0
ABDOMINAL DISTENTION: 0
VOMITING: 0
PHOTOPHOBIA: 0
SHORTNESS OF BREATH: 0
WHEEZING: 0
CHEST TIGHTNESS: 0
ABDOMINAL PAIN: 0
SORE THROAT: 0
COUGH: 0

## 2022-01-07 NOTE — PROGRESS NOTES
PATIENT:  Wayne Taylor    :  1940    DOS:  2021    [facility] [place]    Vital signs, blood pressure 124/88, 67 BPM, 18 RR, 97% SPO2 on room air, 98.2 F, 188 pounds as of November 10, 2021. HPI:  The patient is seen today to follow up on pulmonary edema/lower leg edema/CHF/COPD. The patient is overall stable at this time. Vital signs have been stable for the past several days/weeks. She is clinically stable and is in very good mood. Overall, mood is improved significantly since she stabilized in hospital and transferred here for further recovery. She is excited to go home, looking forward to going home prior to the holiday season begins. Overall, no new dyspnea, shortness of breath, POI or wheezing. She is active and extending herself further in PT with longer duration of standing and ambulation utilizing her walker. She is very happy about this as she lives with family, but likes to be independent. Overall, no new complaints today. Making no changes to medications. We will continue following up with her previously scheduled labs. MEDICATIONS:  Reviewed. ALLERGIES:  Reviewed. REVIEW OF SYSTEMS:  See HPI. PHYSICAL EXAMINATION:  General:  Good hygiene, bright affect, pleasant and cooperative, no acute distress. HEENT:  PERRLA. MMM, no erythema or exudate on oropharyngeal examination. Cardiopulmonary:  RRR ? ectopic beat noted on auscultation. Plus one lower leg edema; stable. Lung sounds are diminished, however no crackles, wheezes or rhonchi noted. Abdomen:  Normoactive bowel sounds. Skin:  Overall good skin turgor. No new wounds, bruises noted on brief skin examination. Mental Status: The patient is awake, alert, and oriented, 3/3. Good memory and command following. ASSESSMENT AND PLAN:  1. CHF - continue monitoring. Good overall vital signs, good energy levels. Denies any orthopnea. Monitor closely. 2.   COPD oxygenating well.   Utilize p.r.n. oxygen if needed. Otherwise, continue maintaining on her room air. 3.   Generalized weakness-continue PT and OT, making good strides. We will have PT, OT, at home as well. 4.   Sarcoidosis - continue prednisone as ordered. Follow up with Pulmonary as scheduled.         Electronically Signed By: Byron Montero PA-C on 01/04/2022 11:10:06  ______________________________  CESAR Villar/[id number]  D: 01/03/2022 21:09:20  T: 01/03/2022 22:26:41    cc: - [facility] [place]

## 2022-01-26 NOTE — ED PROVIDER NOTES
[address] [facility] ED  eMERGENCY dEPARTMENT eNCOUnter      Pt Name: Nic Burk  MRN: [id number]  [place] 1940  Date of evaluation: 1/26/2022  Provider: Giancarlo Mccarty MD    CHIEF COMPLAINT       Chief Complaint   Patient presents with    Fall         HISTORY OF PRESENT ILLNESS   (Location/Symptom, Timing/Onset,Context/Setting, Quality, Duration, Modifying Factors, Severity)  Note limiting factors. Nic Burk is a [de-identified] y.o. female who presents to the emergency department for evaluation after a fall. Patient reports her knees gave out and she fell backwards at home hitting her head on the concrete. No loss of consciousness. Complaining now of mild headache and left foot pain. She normally ambulates with a walker and/or cane at all times. Not currently on any blood thinner    HPI    NursingNotes were reviewed. REVIEW OF SYSTEMS    (2-9 systems for level 4, 10 or more for level 5)     Review of Systems   Constitutional: Negative for chills and diaphoresis. HENT: Negative for congestion, ear pain, mouth sores and sore throat. Eyes: Negative for photophobia and discharge. Respiratory: Negative for cough, chest tightness, shortness of breath and wheezing. Cardiovascular: Negative for chest pain and palpitations. Gastrointestinal: Negative for abdominal distention, abdominal pain and vomiting. Endocrine: Negative for cold intolerance. Genitourinary: Negative for difficulty urinating. Musculoskeletal: Positive for gait problem. Negative for arthralgias. Skin: Negative for pallor and rash. Allergic/Immunologic: Negative for immunocompromised state. Neurological: Negative for dizziness and syncope. Hematological: Negative for adenopathy. Psychiatric/Behavioral: Negative for agitation and hallucinations. All other systems reviewed and are negative. Except as noted above the remainder of the review of systems was reviewed and negative.        PAST MEDICAL HISTORY     Past Medical History:   Diagnosis Date    Acute congestive heart failure ([facility] [address] 8/27/2021    Atrial fibrillation (HCC)     CAD (coronary artery disease)     Cancer (HCC)     CHF (congestive heart failure) (HCC)     Hyperlipidemia     Hypertension     Obesity          SURGICALHISTORY       Past Surgical History:   Procedure Laterality Date    CARDIAC CATHETERIZATION      CAROTID ENDARTERECTOMY Left     CHOLECYSTECTOMY      HYSTERECTOMY, TOTAL ABDOMINAL      PACEMAKER INSERTION Left     TONSILLECTOMY           CURRENT MEDICATIONS       Previous Medications    ACETAMINOPHEN (TYLENOL) 325 MG TABLET    Take 650 mg by mouth every 6 hours as needed for Pain or Fever     ACIDOPHILUS LACTOBACILLUS CAPS    Take 1 capsule by mouth three times daily Indications: Nutritional Support    ALLOPURINOL (ZYLOPRIM) 100 MG TABLET    Take 300 mg by mouth daily Indications: Gout     AMIODARONE (CORDARONE) 200 MG TABLET    Take 200 mg by mouth daily Indications: Heart Rhythm Disorder     ASPIRIN 81 MG EC TABLET    Take 1 tablet by mouth daily    ATORVASTATIN (LIPITOR) 20 MG TABLET    Take 20 mg by mouth daily Indications: High Amount of Fats in the Blood     BIOTIN 5 MG CAPS    Take 5 mg by mouth daily Indications: Nutritional Support     BUMETANIDE (BUMEX) 2 MG TABLET    Take 1 tablet by mouth 2 times daily    CHOLECALCIFEROL (VITAMIN D3) 125 MCG (5000 UT) CAPS    Take 5,000 Units by mouth daily Indications: Nutritional Support     DEXTROMETHORPHAN-GUAIFENESIN (ROBITUSSIN DM PO)    Take 10 mLs by mouth daily as needed Indications: Cough     FLUTICASONE (FLONASE) 50 MCG/ACT NASAL SPRAY    1 spray by Each Nostril route daily Indications: Seasonal Allergy     FOLIC ACID (FOLVITE) 1 MG TABLET    Take 1 mg by mouth daily Indications: Nutritional Support     HYDROXYCHLOROQUINE (PLAQUENIL) 200 MG TABLET    Take 200 mg by mouth 2 times daily Indications: Sarcoidosis    MAGNESIUM OXIDE (MAG-OX) 400 (240 MG) MG TABLET    Take 2 tablets by mouth daily    MELATONIN 3 MG TABS TABLET    Take 6 mg by mouth nightly Indications: Trouble Sleeping 2 tabs     METOLAZONE (ZAROXOLYN) 5 MG TABLET    Take 1 tablet by mouth daily    MICONAZOLE (MICOTIN) 2 % POWDER    Apply topically 2 times daily Indications: Fungal Dermatitis Apply topically 2 times daily. Groin    MIDODRINE (PROAMATINE) 10 MG TABLET    Take 1 tablet by mouth 3 times daily    NITROGLYCERIN (NITROSTAT) 0.4 MG SL TABLET    Place 0.4 mg under the tongue every 5 minutes as needed for Chest pain Indications: Chest Pain up to max of 3 total doses. If no relief after 1 dose, call 911. POTASSIUM CHLORIDE (KLOR-CON M) 20 MEQ TBCR EXTENDED RELEASE TABLET    Take 30 mEq by mouth daily (with breakfast) Indications: Low Amount of Potassium in the Blood     PREDNISONE (DELTASONE) 20 MG TABLET    Take 10 mg by mouth daily Indications: Sarcoidosis Take 40 mg daily thru 10/23/2021; 30mg daily thru 2021;  20mg daily thru  2021; 10mg daily thru 2021, then 5mg dailu     SENNA (SENOKOT) 8.6 MG TABLET    Take 1 tablet by mouth daily Indications: Constipation     SPIRONOLACTONE (ALDACTONE) 25 MG TABLET    Take 12.5 mg by mouth daily Indications: Fluid Overload       ALLERGIES     Codeine    FAMILY HISTORY     History reviewed. No pertinent family history. SOCIAL HISTORY       Social History     Socioeconomic History    Marital status:       Spouse name: None    Number of children: 2    Years of education: None    Highest education level: None   Occupational History    None   Tobacco Use    Smoking status: Former Smoker     Quit date: 2016     Years since quittin.4    Smokeless tobacco: Never Used   Vaping Use    Vaping Use: Never used   Substance and Sexual Activity    Alcohol use: Yes     Comment: socially    Drug use: Never    Sexual activity: None   Other Topics Concern    None   Social History Narrative     Lives With: grandson Romelia Ramirez -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; pt is home alone at times    Type of Home: Apartment (laundry in pt's [address]: One level    Bathroom Shower/Tub: [address]: Shower chair, Grab bars in shower, Grab bars around toilet    Home Equipment: Rolling walker, Lift chair    ADL Assistance: [address]: Needs assistance (michelle cooks; michelle shares laundry task)    Homemaking Responsibilities: Yes    Ambulation Assistance: Independent [employer] used at baseline)    Transfer Assistance: Independent    Active : Yes    Additional Comments: sleeping in a recliner    Retired  for [employer]         Social Determinants of Health     Financial Resource Strain:     Difficulty of Paying Living Expenses: Not on file   Food Insecurity:     Worried About [address] in the Last Year: Not on file    [address] in the Last Year: Not on file   Transportation Needs:     Lack of Transportation (Medical): Not on file    Lack of Transportation (Non-Medical):  Not on file   Physical Activity:     Days of Exercise per Week: Not on file    Minutes of Exercise per Session: Not on file   Stress:     Feeling of Stress : Not on file   Social Connections:     Frequency of Communication with Friends and Family: Not on file    Frequency of Social Gatherings with Friends and Family: Not on file    Attends Mor[employer] Services: Not on file    Active Member of [address] [place] [address] or Organizations: Not on file    Attends Club or Organization Meetings: Not on file    Marital Status: Not on file   Intimate Partner Violence:     Fear of Current or Ex-Partner: Not on file    Emotionally Abused: Not on file    Physically Abused: Not on file    Sexually Abused: Not on file   Housing Stability:     Unable to Pay for Housing in the Last Year: Not on file    Number of [place] in the Last Year: Not on file    Unstable Housing in the Last Year: Not on file       SCREENINGS    Ghada Coma Scale  Eye Opening: Spontaneous  Best Verbal Response: Oriented  Best Motor Response: Obeys commands  Ghada Coma Scale Score: 15 @FLOW([id number])@      PHYSICAL EXAM    (up to 7 for level 4, 8 or more for level 5)     ED Triage Vitals [01/26/22 1506]   BP Temp Temp src Pulse Resp SpO2 Height Weight   124/68 98 °F (36.7 °C) -- 68 18 97 % -- --       Physical Exam  Vitals and nursing note reviewed. Constitutional:       Appearance: She is well-developed. HENT:      Head: Normocephalic. Right Ear: Tympanic membrane normal.      Left Ear: Tympanic membrane normal.      Nose: Nose normal.      Mouth/Throat:      Mouth: Mucous membranes are moist.   Eyes:      Conjunctiva/sclera: Conjunctivae normal.      Pupils: Pupils are equal, round, and reactive to light. Cardiovascular:      Rate and Rhythm: Normal rate and regular rhythm. Heart sounds: Normal heart sounds. Pulmonary:      Effort: Pulmonary effort is normal.      Breath sounds: Normal breath sounds. Abdominal:      General: Bowel sounds are normal.      Palpations: Abdomen is soft. Tenderness: There is no abdominal tenderness. There is no guarding. Musculoskeletal:         General: Normal range of motion. Cervical back: Normal range of motion and neck supple. Left foot: Normal range of motion. Tenderness present. No bony tenderness. Legs:    Skin:     General: Skin is warm and dry. Capillary Refill: Capillary refill takes less than 2 seconds. Neurological:      Mental Status: She is alert and oriented to person, place, and time.    Psychiatric:         Mood and Affect: Mood normal.         DIAGNOSTIC RESULTS     EKG: All EKG's are interpreted by the Emergency Department Physician who either signs or Co-signsthis chart in the absence of a cardiologist.      RADIOLOGY:   Non-plain filmimages such as CT, Ultrasound and MRI are read by the radiologist. Plain radiographic images are visualized and preliminarily interpreted by the emergency physician with the below findings:      Interpretation per the Radiologist below, if available at the time ofthis note:    CT HEAD WO CONTRAST   Final Result      NO ACUTE INTRA-AXIAL OR EXTRA-AXIAL FINDINGS. All CT scans at this facility use dose modulation, iterative reconstruction, and/or weight based dosing when appropriate to reduce radiation dose to as low as reasonably achievable. XR FOOT LEFT (MIN 3 VIEWS)    (Results Pending)         ED BEDSIDE ULTRASOUND:   Performed by ED Physician - none    LABS:  Labs Reviewed - No data to display    All other labs were within normal range or not returned as of this dictation. EMERGENCY DEPARTMENT COURSE and DIFFERENTIAL DIAGNOSIS/MDM:   Vitals:    Vitals:    01/26/22 1506 01/26/22 1636   BP: 124/68 119/72   Pulse: 68 63   Resp: 18 18   Temp: 98 °F (36.7 °C)    SpO2: 97% 98%        MDM the patient's foot x-ray is negative. Head CT is negative. Discharged home with family    CONSULTS:  None    PROCEDURES:  Unless otherwise noted below, none     Procedures    FINAL IMPRESSION      1.  Closed head injury, initial encounter          DISPOSITION/PLAN   DISPOSITION Decision To Discharge 01/26/2022 05:37:58 PM      PATIENT REFERRED TO:  Family Doctor            DISCHARGE MEDICATIONS:  New Prescriptions    No medications on file          (Please note that portions of this note were completed with a voice recognition program.  Efforts were made to edit the dictations but occasionally words are mis-transcribed.)    Bienvenido Becerril MD (electronically signed)  Attending Emergency Physician          Bienvenido Becerril MD  01/26/22 0884

## 2022-01-26 NOTE — ED TRIAGE NOTES
Pt arrived via ems from home With c/o fall. Pt reports lt knee gave out and then rt knee and pt fell on but and hit head on concrete floor. Denies loc. Pt only complaint is lt foot pain at base of toes. O/e neg bruising or swelling noted.

## 2022-01-26 NOTE — ED NOTES
Bed:  [facility]  Expected date: 1/26/22  Expected time:   Means of arrival:   Comments:     Tere NGUYEN RN  01/26/22 6159

## 2022-08-11 NOTE — ED PROVIDER NOTES
[address] [facility] ED  EMERGENCY DEPARTMENT ENCOUNTER      Pt Name: Brandi Tam  MRN: [id number]  [place] 1940  Date of evaluation: 8/10/2022  Provider: Anca Lopez MD    CHIEF COMPLAINT     No chief complaint on file. HISTORY OF PRESENT ILLNESS   (Location/Symptom, Timing/Onset, Context/Setting, Quality, Duration, Modifying Factors, Severity)  Note limiting factors. Brandi Tam is a 81 y.o. female who presents to the emergency department via EMS. Unknown last known well. Patient was reportedly found down at the home, no bystander intervention. EMS stated the patient was in ventricular fibrillation, given amiodarone, epinephrine and intubated in the field. They report that they worked on her for more than 30 minutes prior to arrival.  Unable to give further history, they were concerned that the patient did have bilateral wrist skin tears. Unable to get further history secondary to patient unresponsiveness  HPI  Chart review shows history of CAD status post stent, hypertension, hyperlipidemia, CKD  Nursing Notes were reviewed. REVIEW OF SYSTEMS    (2-9 systems for level 4, 10 or more for level 5)     Review of Systems   Unable to perform ROS: Acuity of condition     Except as noted above the remainder of the review of systems was reviewed and negative.        PAST MEDICAL HISTORY     Past Medical History:   Diagnosis Date    Acute congestive heart failure ([address] 8/27/2021    Atrial fibrillation (HCC)     CAD (coronary artery disease)     Cancer (HCC)     CHF (congestive heart failure) (HCC)     Hyperlipidemia     Hypertension     Obesity          SURGICAL HISTORY       Past Surgical History:   Procedure Laterality Date    CARDIAC CATHETERIZATION      CAROTID ENDARTERECTOMY Left     CHOLECYSTECTOMY      PACEMAKER INSERTION Left     TONSILLECTOMY      TOTAL ABDOMINAL HYSTERECTOMY           CURRENT MEDICATIONS       Previous Medications    ACETAMINOPHEN (TYLENOL) 325 MG TABLET    Take 650 mg by mouth every 6 hours as needed for Pain or Fever     ACIDOPHILUS LACTOBACILLUS CAPS    Take 1 capsule by mouth three times daily Indications: Nutritional Support    ALLOPURINOL (ZYLOPRIM) 100 MG TABLET    Take 300 mg by mouth daily Indications: Gout     AMIODARONE (CORDARONE) 200 MG TABLET    Take 200 mg by mouth daily Indications: Heart Rhythm Disorder     ASPIRIN 81 MG EC TABLET    Take 1 tablet by mouth daily    ATORVASTATIN (LIPITOR) 20 MG TABLET    Take 20 mg by mouth daily Indications: High Amount of Fats in the Blood     BIOTIN 5 MG CAPS    Take 5 mg by mouth daily Indications: Nutritional Support     BUMETANIDE (BUMEX) 2 MG TABLET    Take 1 tablet by mouth 2 times daily    CHOLECALCIFEROL (VITAMIN D3) 125 MCG (5000 UT) CAPS    Take 5,000 Units by mouth daily Indications: Nutritional Support     DEXTROMETHORPHAN-GUAIFENESIN (ROBITUSSIN DM PO)    Take 10 mLs by mouth daily as needed Indications: Cough     FLUTICASONE (FLONASE) 50 MCG/ACT NASAL SPRAY    1 spray by Each Nostril route daily Indications: Seasonal Allergy     FOLIC ACID (FOLVITE) 1 MG TABLET    Take 1 mg by mouth daily Indications: Nutritional Support     HYDROXYCHLOROQUINE (PLAQUENIL) 200 MG TABLET    Take 200 mg by mouth 2 times daily Indications: Sarcoidosis    MAGNESIUM OXIDE (MAG-OX) 400 (240 MG) MG TABLET    Take 2 tablets by mouth daily    MELATONIN 3 MG TABS TABLET    Take 6 mg by mouth nightly Indications: Trouble Sleeping 2 tabs     METOLAZONE (ZAROXOLYN) 5 MG TABLET    Take 1 tablet by mouth daily    MICONAZOLE (MICOTIN) 2 % POWDER    Apply topically 2 times daily Indications: Fungal Dermatitis Apply topically 2 times daily. Groin    MIDODRINE (PROAMATINE) 10 MG TABLET    Take 1 tablet by mouth 3 times daily    NITROGLYCERIN (NITROSTAT) 0.4 MG SL TABLET    Place 0.4 mg under the tongue every 5 minutes as needed for Chest pain Indications: Chest Pain up to max of 3 total doses. If no relief after 1 dose, call 911.      POTASSIUM CHLORIDE (KLOR-CON M) 20 MEQ TBCR EXTENDED RELEASE TABLET    Take 30 mEq by mouth daily (with breakfast) Indications: Low Amount of Potassium in the Blood     PREDNISONE (DELTASONE) 20 MG TABLET    Take 10 mg by mouth daily Indications: Sarcoidosis Take 40 mg daily thru 10/23/2021; 30mg daily thru 2021;  20mg daily thru  2021; 10mg daily thru 2021, then 5mg dailu     SENNA (SENOKOT) 8.6 MG TABLET    Take 1 tablet by mouth daily Indications: Constipation     SPIRONOLACTONE (ALDACTONE) 25 MG TABLET    Take 12.5 mg by mouth daily Indications: Fluid Overload       ALLERGIES     Codeine    FAMILY HISTORY     No family history on file. SOCIAL HISTORY       Social History     Socioeconomic History    Marital status:     Number of children: 2   Tobacco Use    Smoking status: Former     Types: Cigarettes     Quit date: 2016     Years since quittin.9    Smokeless tobacco: Never   Vaping Use    Vaping Use: Never used   Substance and Sexual Activity    Alcohol use: Yes     Comment: socially    Drug use: Never   Social History Narrative     Lives With: michelle Mace Caro -works ft as a [employer] ; pt is home alone at times    Type of Home: Apartment (laundry in pt's [address] [facility] [address]: One level    Bathroom Shower/Tub: Walk-in shower    [employer]: [address] chair, Grab bars in shower, Grab bars around toilet    [address]: Rolling walker, Lift chair    ADL Assistance: Independent    Homemaking Assistance: Needs assistance (michelle cooks; michelle shares laundry task)    Homemaking Responsibilities: Yes    Ambulation Assistance: [address] used at baseline)    Transfer Assistance: Independent    Active :  Yes    Additional Comments: sleeping in a recliner    Retired  for [employer]           [facility]  BP: (!) 0/0  Heart Rate: (!) 0                 PHYSICAL EXAM    (up to 7 for level 4, 8 or more for level 5)     ED Triage Vitals   BP Temp Temp src Pulse Resp SpO2 Height Weight   -- -- -- -- -- -- -- --       Physical Exam  Constitutional:       Comments: No overt signs of head injury. Pupils 5 mm fixed and nonreactive. No subconjunctival hemorrhage or facial trauma. No midline vertebral step-off. No reproducible chest wall deformity or abdominal distention. Patient has mottled cyanotic skin. Unresponsive. GCS 3.  Endotracheal tube in place, bagging well. Pulseless. Does not withdraw from pain. HENT:      Head: Normocephalic and atraumatic. Mouth/Throat:      Mouth: Mucous membranes are dry. Eyes:      General: No scleral icterus. Pulmonary:      Comments: Equal breath sounds when bagged  Musculoskeletal:      Right lower leg: No edema. Left lower leg: No edema. Skin:     Capillary Refill: Capillary refill takes more than 3 seconds. DIAGNOSTIC RESULTS     EKG: All EKG's are interpreted by the Emergency Department Physician who either signs or Co-signs this chart in the absence of a cardiologist.      RADIOLOGY:   Non-plain film images such as CT, Ultrasound and MRI are read by the radiologist. Plain radiographic images are visualized and preliminarily interpreted by the emergency physician with the below findings:      Interpretation per the Radiologist below, if available at the time of this note:    XR CHEST PORTABLE    (Results Pending)         ED BEDSIDE ULTRASOUND:   Performed by ED Physician - none    LABS:  Labs Reviewed   COVID-19, RAPID   ETHANOL   COMPREHENSIVE METABOLIC PANEL   PROTIME-INR   APTT   TROPONIN   MAGNESIUM   LACTIC ACID   PROCALCITONIN   CK   BRAIN NATRIURETIC PEPTIDE   SPECIMEN REJECTION   POCT EPOC BLOOD GAS, LACTIC ACID, ICA       All other labs were within normal range or not returned as of this dictation.     EMERGENCY DEPARTMENT COURSE and DIFFERENTIAL DIAGNOSIS/MDM:   Vitals:    Vitals:    08/10/22 2102   BP: (!) 0/0 Pulse: (!) 0   Resp: (!) 0   SpO2: (!) 0%         [facility]  Nursing had paged the patient a level 1 trauma however resuscitation was stopped prior to trauma coming in, we alerted the trauma physician of the patient's presentation  Patient has right external jugular venous access, right tibial IO  This did appear to be a medical cardiopulmonary arrest  Patient presents in ventricular fibrillation  Patient presented to the emergency department unknown last known well, was found down at home, first responders stated that she was in ventricular fibrillation, she received amiodarone x2 prior to arrival, multiple shocks, epinephrine, was intubated in the field, here she was bagging well, did not think it was appropriate to exchange ET tube, ACLS protocol was followed with immediate starting CPR, epinephrine and lidocaine, given IV fluids as there was no sign of hypervolemia, ABG obtained showing acidosis and elevated lactic acid, given sodium and bicarbonate, IV calcium, IV magnesium, multiple defibrillations- please see nursing resuscitation charting, patient had abrasions to bilateral wrist but no other overt signs of traumatic injury, given that the patient was worked in the field for more than 30 minutes, was not withdrawing from pain here, had no spontaneous respirations and no peripheral perfusion, no palpable pulses, resuscitation stopped 2057, ALEX TREJO  Spoke to multiple family members afterwards, including the patient's medical decision-maker who stated that her wishes were to be a DNR, that she would not want any artificial means of life support    REASSESSMENT      Family updated  Spoke to \[facility] [place] [facility]\"" Resources, who will sign death certificate  Spoke to dr Jacques Dunn - no case    CRITICAL CARE TIME   Total Critical Care time was 30 minutes, excluding separately reportable procedures.   There was a high probability of clinically significant/life threatening deterioration in the patient's condition which required my urgent intervention. Cardiopulmonary arrest, speaking to family and patient surrogate    CONSULTS:  None    PROCEDURES:  Unless otherwise noted below, none     Procedures      FINAL IMPRESSION      1. Cardiopulmonary arrest [facility])          DISPOSITION/PLAN   DISPOSITION  08/10/2022 08:59:19 PM      PATIENT REFERRED TO:  No follow-up provider specified. DISCHARGE MEDICATIONS:  New Prescriptions    No medications on file     Controlled Substances Monitoring:     No flowsheet data found.     (Please note that portions of this note were completed with a voice recognition program.  Efforts were made to edit the dictations but occasionally words are mis-transcribed.)    Anca Lopez MD (electronically signed)  Attending Emergency Physician            Anca Lopez MD  08/10/22 2146       Italia Gtz MD  08/10/22 2152       Italia Gtz MD  08/10/22 2153

## 2023-04-12 NOTE — PROGRESS NOTES
Physical Therapy Med Surg Daily Treatment Note  Facility/Department: [facility]  Room: [place] [address]       NAME: Gloria Jordan  : 1940 ([de-identified] y.o.)  MRN: [id number]  CODE STATUS: [id number]    Date of Service: 9/3/2021    Patient Diagnosis(es): Hyperkalemia [E87.5]  Acute renal failure, unspecified acute renal failure type ([facility] [N17.9]  Acute congestive heart failure, unspecified heart failure type [facility]) [I50.9]   Chief Complaint   Patient presents with    Shortness of Breath     Patient Active Problem List    Diagnosis Date Noted    Endometrial cancer ([facility] 2021    Gait abnormality 2021    Hyperkalemia 2021    JOSIAH on CKD 2021    Acute congestive heart failure ([facility] 2021    PAF  2021    CAD 2021    HTN 2021    HLD 2021    Pulmonary HTN ([facility] 2021    Dry eye syndrome, bilateral 2020    CKD (chronic kidney disease) stage 4, GFR 15-29 ml/min ([facility]) 2019    Chronic systolic heart failure ([facility] 2018    Secondary osteoarthritis of multiple sites 2017    Vitamin D deficiency 2017    Carotid artery stenosis 2017    Ischemic cardiomyopathy 2017    BMI 40.0-44.9, adult ([facility] 2017    Stented coronary artery 2017    Cervicalgia 2007    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        Past Medical History:   Diagnosis Date    Atrial fibrillation (HCC)     CAD (coronary artery disease)     Cancer (HCC)     CHF (congestive heart failure) (HCC)     Hyperlipidemia     Hypertension     Obesity      Past Surgical History:   Procedure Laterality Date    CARDIAC CATHETERIZATION      CAROTID ENDARTERECTOMY Left     CHOLECYSTECTOMY      HYSTERECTOMY, TOTAL ABDOMINAL      PACEMAKER INSERTION Left     TONSILLECTOMY         Restrictions  Restrictions/Precautions: Fall Risk (mod balderas score)    SUBJECTIVE   General  Chart Reviewed: Yes  Family / Caregiver Present: No  Subjective  Subjective: \"I've been getting dizzy when I move around. \"    Pre-Session Pain Report  Pre Treatment Pain Screening  Pain at present: 0  Scale Used: Numeric Score  Intervention List: Patient able to continue with treatment  Pain Screening  Patient Currently in Pain: No       Post-Session Pain Report  Pain Assessment  Pain Assessment: 0-10  Pain Level: 0         OBJECTIVE        Bed mobility  Supine to Sit: Moderate assistance  Sit to Supine: Moderate assistance  Comment: increased time and effort to complete, pt needing assistance moving BLE into and out of bed. Transfers  Sit to Stand: Moderate Assistance (+2 present for safety.)  Stand to sit: Moderate Assistance  Comment: VC for hand placement. VC for anterior weight shifting. assistance coming to full stand and VC to tuck in bottom and stand tall. Ambulation  Ambulation?: Yes  Ambulation 1  Surface: level tile  Device: Rolling Walker  Assistance: Moderate assistance  Quality of Gait: short BLE step length, slow allegra, FF posture. downward gaze, WBOS, increased lateral sway  Distance: Side stepping along EOB 6' and 3\" FWD/BWD. Comments: fatigued following                  Activity Tolerance  Activity Tolerance: Patient Tolerated treatment well          ASSESSMENT   Assessment: pt fatigues quickly reports mild dizziness but overall improved from previous session according to pt. increased time for rest breaks. Discharge Recommendations:  Continue to assess pending progress    Goals  Short term goals  Short term goal 1: Pt will demonstrate HEP indep  Long term goals  Long term goal 1: Pt will demonstrate transfers mod indep with safest AD  Long term goal 2: Pt will demonstrate amb 100ft mod indep with safest AD  Long term goal 3: Pt will demonstrate standing tolerance 5 min without UE support    PLAN    Times per week: 3-6  Safety Devices  Type of devices:  All fall risk precautions in place     [facility] (6 CLICK) BASIC MOBILITY  AM-PAC Inpatient Mobility Raw Score : 12      Therapy Time   Individual   Time In 1043   Time Out 1106   Minutes 23      BM/Trsf: 15  Gait: 8        Lauren LEN Esquivel, 09/03/21 at 11:54 AM         Definitions for assistance levels  Independent = pt does not require any physical supervision or assistance from another person for activity completion. Device may be needed.   Stand by assistance = pt requires verbal cues or instructions from another person, close to but not touching, to perform the activity  Minimal assistance= pt performs 75% or more of the activity; assistance is required to complete the activity  Moderate assistance= pt performs 50% of the activity; assistance is required to complete the activity  Maximal assistance = pt performs 25% of the activity; assistance is required to complete the activity  Dependent = pt requires total physical assistance to accomplish the task PAST SURGICAL HISTORY:  H/O aortic valve stenosis     H/O pulmonary valve stenosis
